# Patient Record
Sex: MALE | Race: WHITE | Employment: FULL TIME | URBAN - NONMETROPOLITAN AREA
[De-identification: names, ages, dates, MRNs, and addresses within clinical notes are randomized per-mention and may not be internally consistent; named-entity substitution may affect disease eponyms.]

---
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
## 2022-01-14 ENCOUNTER — HOSPITAL ENCOUNTER (EMERGENCY)
Age: 22
Discharge: HOME | End: 2022-01-14
Attending: EMERGENCY MEDICINE
Payer: MEDICAID

## 2022-01-14 ENCOUNTER — HOSPITAL ENCOUNTER (EMERGENCY)
Age: 22
Discharge: HOME | End: 2022-01-14
Attending: FAMILY MEDICINE
Payer: MEDICAID

## 2022-01-14 ENCOUNTER — APPOINTMENT (OUTPATIENT)
Dept: CT IMAGING | Age: 22
End: 2022-01-14
Payer: MEDICAID

## 2022-01-14 VITALS
RESPIRATION RATE: 16 BRPM | OXYGEN SATURATION: 98 % | HEART RATE: 83 BPM | DIASTOLIC BLOOD PRESSURE: 85 MMHG | SYSTOLIC BLOOD PRESSURE: 146 MMHG | WEIGHT: 185 LBS | HEIGHT: 74 IN | TEMPERATURE: 97 F | BODY MASS INDEX: 23.74 KG/M2

## 2022-01-14 VITALS
DIASTOLIC BLOOD PRESSURE: 82 MMHG | TEMPERATURE: 98.3 F | HEIGHT: 74 IN | RESPIRATION RATE: 18 BRPM | HEART RATE: 100 BPM | BODY MASS INDEX: 23.74 KG/M2 | SYSTOLIC BLOOD PRESSURE: 133 MMHG | WEIGHT: 185 LBS | OXYGEN SATURATION: 98 %

## 2022-01-14 DIAGNOSIS — K80.50 BILIARY COLIC: ICD-10-CM

## 2022-01-14 DIAGNOSIS — R10.11 RIGHT UPPER QUADRANT ABDOMINAL PAIN: Primary | ICD-10-CM

## 2022-01-14 DIAGNOSIS — K59.00 CONSTIPATION, UNSPECIFIED CONSTIPATION TYPE: ICD-10-CM

## 2022-01-14 DIAGNOSIS — R11.0 NAUSEA: ICD-10-CM

## 2022-01-14 DIAGNOSIS — R10.11 ABDOMINAL PAIN, RIGHT UPPER QUADRANT: Primary | ICD-10-CM

## 2022-01-14 DIAGNOSIS — R16.1 SPLENOMEGALY: ICD-10-CM

## 2022-01-14 DIAGNOSIS — R79.89 ABNORMAL LFTS (LIVER FUNCTION TESTS): ICD-10-CM

## 2022-01-14 LAB
ABSOLUTE EOS #: 0 K/UL (ref 0–0.4)
ABSOLUTE EOS #: 0.1 K/UL (ref 0–0.4)
ABSOLUTE IMMATURE GRANULOCYTE: NORMAL K/UL (ref 0–0.3)
ABSOLUTE IMMATURE GRANULOCYTE: NORMAL K/UL (ref 0–0.3)
ABSOLUTE LYMPH #: 2 K/UL (ref 1.2–5.2)
ABSOLUTE LYMPH #: 3.9 K/UL (ref 1.2–5.2)
ABSOLUTE MONO #: 0.6 K/UL (ref 0–1)
ABSOLUTE MONO #: 0.7 K/UL (ref 0–1)
ALBUMIN SERPL-MCNC: 3.9 G/DL (ref 3.5–5.2)
ALBUMIN SERPL-MCNC: 3.9 G/DL (ref 3.5–5.2)
ALBUMIN/GLOBULIN RATIO: ABNORMAL (ref 1–2.5)
ALBUMIN/GLOBULIN RATIO: ABNORMAL (ref 1–2.5)
ALP BLD-CCNC: 106 U/L (ref 40–129)
ALP BLD-CCNC: 71 U/L (ref 40–129)
ALT SERPL-CCNC: 411 U/L (ref 5–41)
ALT SERPL-CCNC: 99 U/L (ref 5–41)
AMPHETAMINE SCREEN URINE: NEGATIVE
ANION GAP SERPL CALCULATED.3IONS-SCNC: 11 MMOL/L (ref 9–17)
ANION GAP SERPL CALCULATED.3IONS-SCNC: 12 MMOL/L (ref 9–17)
AST SERPL-CCNC: 366 U/L
AST SERPL-CCNC: 73 U/L
BARBITURATE SCREEN URINE: NEGATIVE
BASOPHILS # BLD: 0 % (ref 0–2)
BASOPHILS # BLD: 1 % (ref 0–2)
BASOPHILS ABSOLUTE: 0 K/UL (ref 0–0.2)
BASOPHILS ABSOLUTE: 0 K/UL (ref 0–0.2)
BENZODIAZEPINE SCREEN, URINE: NEGATIVE
BILIRUB SERPL-MCNC: 0.5 MG/DL (ref 0.3–1.2)
BILIRUB SERPL-MCNC: 2.11 MG/DL (ref 0.3–1.2)
BILIRUBIN DIRECT: 1.25 MG/DL
BILIRUBIN URINE: NEGATIVE
BILIRUBIN, INDIRECT: 0.86 MG/DL (ref 0–1)
BUN BLDV-MCNC: 8 MG/DL (ref 6–20)
BUN BLDV-MCNC: 9 MG/DL (ref 6–20)
BUN/CREAT BLD: 12 (ref 9–20)
BUN/CREAT BLD: 14 (ref 9–20)
BUPRENORPHINE URINE: NORMAL
CALCIUM SERPL-MCNC: 8.8 MG/DL (ref 8.6–10.4)
CALCIUM SERPL-MCNC: 8.9 MG/DL (ref 8.6–10.4)
CANNABINOID SCREEN URINE: NEGATIVE
CHLORIDE BLD-SCNC: 101 MMOL/L (ref 98–107)
CHLORIDE BLD-SCNC: 99 MMOL/L (ref 98–107)
CO2: 25 MMOL/L (ref 20–31)
CO2: 30 MMOL/L (ref 20–31)
COCAINE METABOLITE, URINE: NEGATIVE
COLOR: YELLOW
COMMENT UA: NORMAL
CREAT SERPL-MCNC: 0.63 MG/DL (ref 0.7–1.2)
CREAT SERPL-MCNC: 0.68 MG/DL (ref 0.7–1.2)
DIFFERENTIAL TYPE: YES
DIFFERENTIAL TYPE: YES
EOSINOPHILS RELATIVE PERCENT: 1 % (ref 0–5)
EOSINOPHILS RELATIVE PERCENT: 1 % (ref 0–5)
GFR AFRICAN AMERICAN: ABNORMAL ML/MIN
GFR AFRICAN AMERICAN: ABNORMAL ML/MIN
GFR NON-AFRICAN AMERICAN: ABNORMAL ML/MIN
GFR NON-AFRICAN AMERICAN: ABNORMAL ML/MIN
GFR SERPL CREATININE-BSD FRML MDRD: ABNORMAL ML/MIN/{1.73_M2}
GLOBULIN: ABNORMAL G/DL (ref 1.5–3.8)
GLUCOSE BLD-MCNC: 107 MG/DL (ref 70–99)
GLUCOSE BLD-MCNC: 108 MG/DL (ref 70–99)
GLUCOSE URINE: NEGATIVE
HCT VFR BLD CALC: 42.2 % (ref 41–53)
HCT VFR BLD CALC: 43.6 % (ref 41–53)
HEMOGLOBIN: 14.1 G/DL (ref 13.5–17.5)
HEMOGLOBIN: 14.9 G/DL (ref 13.5–17.5)
IMMATURE GRANULOCYTES: NORMAL %
IMMATURE GRANULOCYTES: NORMAL %
KETONES, URINE: NEGATIVE
LACTIC ACID: 1.2 MMOL/L (ref 0.5–2.2)
LEUKOCYTE ESTERASE, URINE: NEGATIVE
LIPASE: 25 U/L (ref 13–60)
LIPASE: 31 U/L (ref 13–60)
LYMPHOCYTES # BLD: 30 % (ref 13–44)
LYMPHOCYTES # BLD: 44 % (ref 13–44)
MCH RBC QN AUTO: 29 PG (ref 26–34)
MCH RBC QN AUTO: 29.3 PG (ref 26–34)
MCHC RBC AUTO-ENTMCNC: 33.6 G/DL (ref 31–37)
MCHC RBC AUTO-ENTMCNC: 34.2 G/DL (ref 31–37)
MCV RBC AUTO: 85.6 FL (ref 80–100)
MCV RBC AUTO: 86.3 FL (ref 80–100)
MDMA URINE: NORMAL
METHADONE SCREEN, URINE: NEGATIVE
METHAMPHETAMINE, URINE: NEGATIVE
MONOCYTES # BLD: 8 % (ref 5–9)
MONOCYTES # BLD: 9 % (ref 5–9)
NITRITE, URINE: NEGATIVE
NRBC AUTOMATED: NORMAL PER 100 WBC
NRBC AUTOMATED: NORMAL PER 100 WBC
OPIATES, URINE: NEGATIVE
OXYCODONE SCREEN URINE: NEGATIVE
PDW BLD-RTO: 13.5 % (ref 12.1–15.2)
PDW BLD-RTO: 14.1 % (ref 12.1–15.2)
PH UA: 6 (ref 5–8)
PHENCYCLIDINE, URINE: NEGATIVE
PLATELET # BLD: 195 K/UL (ref 140–450)
PLATELET # BLD: 202 K/UL (ref 140–450)
PLATELET ESTIMATE: NORMAL
PLATELET ESTIMATE: NORMAL
PMV BLD AUTO: NORMAL FL (ref 6–12)
PMV BLD AUTO: NORMAL FL (ref 6–12)
POTASSIUM SERPL-SCNC: 4.1 MMOL/L (ref 3.7–5.3)
POTASSIUM SERPL-SCNC: 4.3 MMOL/L (ref 3.7–5.3)
PROPOXYPHENE, URINE: NEGATIVE
PROTEIN UA: NEGATIVE
RBC # BLD: 4.88 M/UL (ref 4.5–5.9)
RBC # BLD: 5.09 M/UL (ref 4.5–5.9)
RBC # BLD: NORMAL 10*6/UL
RBC # BLD: NORMAL 10*6/UL
SEG NEUTROPHILS: 46 % (ref 39–75)
SEG NEUTROPHILS: 60 % (ref 39–75)
SEGMENTED NEUTROPHILS ABSOLUTE COUNT: 4 K/UL (ref 2.1–6.5)
SEGMENTED NEUTROPHILS ABSOLUTE COUNT: 4.1 K/UL (ref 2.1–6.5)
SODIUM BLD-SCNC: 138 MMOL/L (ref 135–144)
SODIUM BLD-SCNC: 140 MMOL/L (ref 135–144)
SPECIFIC GRAVITY UA: 1.02 (ref 1–1.03)
TEST INFORMATION: NORMAL
TOTAL PROTEIN: 6.8 G/DL (ref 6.4–8.3)
TOTAL PROTEIN: 6.9 G/DL (ref 6.4–8.3)
TRICYCLIC ANTIDEPRESSANTS, UR: NEGATIVE
TURBIDITY: CLEAR
URINE HGB: NEGATIVE
UROBILINOGEN, URINE: NORMAL
WBC # BLD: 6.7 K/UL (ref 4.5–13.5)
WBC # BLD: 8.7 K/UL (ref 4.5–13.5)
WBC # BLD: NORMAL 10*3/UL
WBC # BLD: NORMAL 10*3/UL

## 2022-01-14 PROCEDURE — 74177 CT ABD & PELVIS W/CONTRAST: CPT

## 2022-01-14 PROCEDURE — 99284 EMERGENCY DEPT VISIT MOD MDM: CPT

## 2022-01-14 PROCEDURE — 99283 EMERGENCY DEPT VISIT LOW MDM: CPT

## 2022-01-14 PROCEDURE — 6360000002 HC RX W HCPCS: Performed by: EMERGENCY MEDICINE

## 2022-01-14 PROCEDURE — 96374 THER/PROPH/DIAG INJ IV PUSH: CPT

## 2022-01-14 PROCEDURE — 81003 URINALYSIS AUTO W/O SCOPE: CPT

## 2022-01-14 PROCEDURE — 80076 HEPATIC FUNCTION PANEL: CPT

## 2022-01-14 PROCEDURE — 6370000000 HC RX 637 (ALT 250 FOR IP): Performed by: FAMILY MEDICINE

## 2022-01-14 PROCEDURE — 83605 ASSAY OF LACTIC ACID: CPT

## 2022-01-14 PROCEDURE — 85025 COMPLETE CBC W/AUTO DIFF WBC: CPT

## 2022-01-14 PROCEDURE — 80048 BASIC METABOLIC PNL TOTAL CA: CPT

## 2022-01-14 PROCEDURE — 80306 DRUG TEST PRSMV INSTRMNT: CPT

## 2022-01-14 PROCEDURE — 83690 ASSAY OF LIPASE: CPT

## 2022-01-14 PROCEDURE — 80053 COMPREHEN METABOLIC PANEL: CPT

## 2022-01-14 PROCEDURE — 6360000004 HC RX CONTRAST MEDICATION: Performed by: FAMILY MEDICINE

## 2022-01-14 RX ORDER — ONDANSETRON 4 MG/1
4 TABLET, ORALLY DISINTEGRATING ORAL EVERY 4 HOURS PRN
Qty: 15 TABLET | Refills: 0 | Status: SHIPPED | OUTPATIENT
Start: 2022-01-14 | End: 2022-02-01

## 2022-01-14 RX ORDER — ONDANSETRON 4 MG/1
4 TABLET, ORALLY DISINTEGRATING ORAL ONCE
Status: COMPLETED | OUTPATIENT
Start: 2022-01-14 | End: 2022-01-14

## 2022-01-14 RX ORDER — DOCUSATE SODIUM 100 MG/1
100 CAPSULE, LIQUID FILLED ORAL 2 TIMES DAILY PRN
Qty: 30 CAPSULE | Refills: 0 | Status: SHIPPED | OUTPATIENT
Start: 2022-01-14 | End: 2022-02-01

## 2022-01-14 RX ORDER — ONDANSETRON 4 MG/1
4 TABLET, ORALLY DISINTEGRATING ORAL ONCE
Status: DISCONTINUED | OUTPATIENT
Start: 2022-01-14 | End: 2022-01-15 | Stop reason: HOSPADM

## 2022-01-14 RX ORDER — KETOROLAC TROMETHAMINE 30 MG/ML
30 INJECTION, SOLUTION INTRAMUSCULAR; INTRAVENOUS ONCE
Status: COMPLETED | OUTPATIENT
Start: 2022-01-14 | End: 2022-01-14

## 2022-01-14 RX ORDER — POLYETHYLENE GLYCOL 3350 17 G/17G
17 POWDER, FOR SOLUTION ORAL DAILY PRN
Qty: 255 G | Refills: 1 | Status: SHIPPED | OUTPATIENT
Start: 2022-01-14 | End: 2022-02-01

## 2022-01-14 RX ADMIN — IOPAMIDOL 75 ML: 755 INJECTION, SOLUTION INTRAVENOUS at 21:13

## 2022-01-14 RX ADMIN — KETOROLAC TROMETHAMINE 30 MG: 30 INJECTION, SOLUTION INTRAMUSCULAR; INTRAVENOUS at 06:05

## 2022-01-14 RX ADMIN — ONDANSETRON 4 MG: 4 TABLET, ORALLY DISINTEGRATING ORAL at 22:33

## 2022-01-14 ASSESSMENT — PAIN DESCRIPTION - LOCATION: LOCATION: ABDOMEN

## 2022-01-14 ASSESSMENT — PAIN DESCRIPTION - ONSET: ONSET: ON-GOING

## 2022-01-14 ASSESSMENT — PAIN DESCRIPTION - DESCRIPTORS: DESCRIPTORS: SHARP

## 2022-01-14 ASSESSMENT — PAIN SCALES - GENERAL
PAINLEVEL_OUTOF10: 8
PAINLEVEL_OUTOF10: 6
PAINLEVEL_OUTOF10: 10

## 2022-01-14 ASSESSMENT — PAIN DESCRIPTION - ORIENTATION: ORIENTATION: RIGHT;UPPER

## 2022-01-14 ASSESSMENT — PAIN DESCRIPTION - PAIN TYPE: TYPE: ACUTE PAIN

## 2022-01-14 ASSESSMENT — PAIN DESCRIPTION - FREQUENCY: FREQUENCY: INTERMITTENT

## 2022-01-14 NOTE — ED PROVIDER NOTES
eMERGENCY dEPARTMENT eNCOUnter      [address] [facility]    Chief Complaint   Patient presents with    Abdominal Pain     Pt c/o RUQ abd pain x1 day       HPI    Triston Gar is a 25 y.o. male who presents to ED from home. By car. With complaint of right upper quadrant abdominal pain. Onset 1 AM.  Patient states that at 1 AM he started having sharp stabbing pain in the right upper abdomen. I she denies fever denies nausea vomiting or diarrhea. Location of symptoms right upper quadrant. REVIEW OF SYSTEMS    All systems reviewed and positives are in the HPI    PAST MEDICAL HISTORY    Past Medical History:   Diagnosis Date    Drug abuse ([facility] [address]        SURGICAL HISTORY    Past Surgical History:   Procedure Laterality Date    TYMPANOSTOMY TUBE PLACEMENT         CURRENT MEDICATIONS        ALLERGIES    Allergies   Allergen Reactions    Augmentin [Amoxicillin-Pot Clavulanate]        FAMILY HISTORY    History reviewed. No pertinent family history.     SOCIAL HISTORY    Social History     Socioeconomic History    Marital status: Single     Spouse name: None    Number of children: None    Years of education: None    Highest education level: None   Occupational History    None   Tobacco Use    Smoking status: Current Every Day Smoker     Packs/day: 1.00     Years: 4.00     Pack years: 4.00     Types: Cigarettes     Start date: 2017    Smokeless tobacco: Never Used   Vaping Use    Vaping Use: Former   Substance and Sexual Activity    Alcohol use: Not Currently    Drug use: Not Currently     Types: Marijuana (Weed), Methamphetamines (Crystal Meth)    Sexual activity: None   Other Topics Concern    None   Social History Narrative    None     Social Determinants of Health     Financial Resource Strain:     Difficulty of Paying Living Expenses: Not on file   Food Insecurity:     Worried About Running Out of Food in the Last Year: Not on file    [place] of Food in the Last Year: Not on file Transportation Needs:     Lack of Transportation (Medical): Not on file    Lack of Transportation (Non-Medical):  Not on file   Physical Activity:     Days of Exercise per Week: Not on file    Minutes of Exercise per Session: Not on file   Stress:     Feeling of Stress : Not on file   Social Connections:     Frequency of Communication with Friends and Family: Not on file    Frequency of Social Gatherings with Friends and Family: Not on file    Attends Evangelical Services: Not on file    Active Member of [address] [place] [address] or Organizations: Not on file    Attends Club or Organization Meetings: Not on file    Marital Status: Not on file   Intimate Partner Violence:     Fear of Current or Ex-Partner: Not on file    Emotionally Abused: Not on file    Physically Abused: Not on file    Sexually Abused: Not on file   Housing Stability:     Unable to Pay for Housing in the Last Year: Not on file    Number of [place] in the Last Year: Not on file    Unstable Housing in the Last Year: Not on file       PHYSICAL EXAM    VITAL SIGNS: BP (!) 146/85   Pulse 83   Temp 97 °F (36.1 °C) (Temporal)   Resp 16   Ht 6' 2\" (1.88 m)   Wt 185 lb (83.9 kg)   SpO2 98%   BMI 23.75 kg/m²   Constitutional:  Well developed, well nourished, no acute distress, non-toxic appearance   Eyes:  PERRL, conjunctiva normal   HENT:  Atraumatic, external ears normal, nose normal, oropharynx moist. Neck supple   Respiratory:  No respiratory distress, normal breath sounds   Cardiovascular:  Normal rate, normal rhythm, no murmurs, no gallops, no rubs   GI: Right upper quadrant tenderness no guarding or rebound  : No CVA tenderness to percussion  Musculoskeletal:  No edema   Integument:  Well hydrated   Neurologic:  Alert & oriented x 3, no focal deficits     EKG        RADIOLOGY/PROCEDURES    No orders to display     Labs  Labs Reviewed   COMPREHENSIVE METABOLIC PANEL - Abnormal; Notable for the following components:       Result Value Glucose 108 (*)     CREATININE 0.63 (*)     ALT 99 (*)     AST 73 (*)     All other components within normal limits   CBC WITH AUTO DIFFERENTIAL   URINALYSIS   URINE DRUG SCREEN   LIPASE           Summation      Patient Course: Patient is given Toradol 30 mg IV. Labs with no acute findings. Patient feels better. Patient will be sent home to follow-up with primary care provider. Other ultrasound is recommended. ED Medications administered this visit:    Medications   ketorolac (TORADOL) injection 30 mg (30 mg IntraVENous Given 1/14/22 0605)       New Prescriptions from this visit:    New Prescriptions    No medications on file       Follow-up:  No follow-up provider specified. Final Impression:   1.  Right upper quadrant abdominal pain               (Please note that portions of this note were completed with a voice recognition program.  Efforts were made to edit the dictations but occasionally words are mis-transcribed.)      (Please note that portions of this note were completed with a voice recognition program.  Efforts were made to edit the dictations but occasionally words are mis-transcribed.)      Kale Smith MD  01/14/22 4182

## 2022-01-15 ENCOUNTER — HOSPITAL ENCOUNTER (OUTPATIENT)
Age: 22
Discharge: HOME | End: 2022-01-15
Payer: MEDICAID

## 2022-01-15 ENCOUNTER — HOSPITAL ENCOUNTER (OUTPATIENT)
Age: 22
Discharge: HOME | End: 2022-01-17
Payer: MEDICAID

## 2022-01-15 ENCOUNTER — HOSPITAL ENCOUNTER (OUTPATIENT)
Dept: ULTRASOUND IMAGING | Age: 22
Discharge: HOME | End: 2022-01-17
Payer: MEDICAID

## 2022-01-15 DIAGNOSIS — R79.89 ABNORMAL LFTS (LIVER FUNCTION TESTS): ICD-10-CM

## 2022-01-15 DIAGNOSIS — R10.11 ABDOMINAL PAIN, RIGHT UPPER QUADRANT: ICD-10-CM

## 2022-01-15 LAB
ALBUMIN SERPL-MCNC: 4.3 G/DL (ref 3.5–5.2)
ALBUMIN/GLOBULIN RATIO: ABNORMAL (ref 1–2.5)
ALP BLD-CCNC: 137 U/L (ref 40–129)
ALT SERPL-CCNC: 493 U/L (ref 5–41)
AST SERPL-CCNC: 354 U/L
BILIRUB SERPL-MCNC: 3.42 MG/DL (ref 0.3–1.2)
BILIRUBIN DIRECT: 2.1 MG/DL
BILIRUBIN, INDIRECT: 1.32 MG/DL (ref 0–1)
GLOBULIN: ABNORMAL G/DL (ref 1.5–3.8)
TOTAL PROTEIN: 7.3 G/DL (ref 6.4–8.3)

## 2022-01-15 PROCEDURE — 76705 ECHO EXAM OF ABDOMEN: CPT

## 2022-01-15 PROCEDURE — 80076 HEPATIC FUNCTION PANEL: CPT

## 2022-01-15 PROCEDURE — 36415 COLL VENOUS BLD VENIPUNCTURE: CPT

## 2022-01-15 NOTE — ED PROVIDER NOTES
[address] [facility]  eMERGENCY dEPARTMENT eNCOUnter          Nely Chang       Chief Complaint   Patient presents with    Abdominal Pain     Pt here earlier for abd pain. Given IV toradol. Pt states he was instructed to come back if he continued to vomit. Pt states he has been unable to keep anything down. Nurses Notes reviewed and I agree except as noted in the HPI. HISTORY OF PRESENT ILLNESS    Will Villa is a 25 y.o. male who presents to the emergency room via private vehicle, patient having abdominal pain and aching in his right upper quadrant, describing 8 out of 10 aching occasionally sharp pain, patient states not been able to keep anything down, vomiting anytime he even tries to drink anything. Patient denies trauma falls, denies gross fever or chills. REVIEW OF SYSTEMS     Review of Systems   All other systems reviewed and are negative. PAST MEDICAL HISTORY    has a past medical history of Drug abuse ([facility] [address] SURGICAL HISTORY      has a past surgical history that includes Tympanostomy tube placement. CURRENT MEDICATIONS       Discharge Medication List as of 1/14/2022 10:36 PM          ALLERGIES     is allergic to augmentin [amoxicillin-pot clavulanate]. FAMILY HISTORY     has no family status information on file. family history is not on file. SOCIAL HISTORY      reports that he has been smoking cigarettes. He started smoking about 5 years ago. He has a 4.00 pack-year smoking history. He has never used smokeless tobacco. He reports previous alcohol use. He reports previous drug use. Drugs: Marijuana (Weed) and Methamphetamines (Crystal Meth). PHYSICAL EXAM     INITIAL VITALS:  height is 6' 2\" (1.88 m) and weight is 185 lb (83.9 kg). His oral temperature is 98.3 °F (36.8 °C). His blood pressure is 133/82 and his pulse is 100. His respiration is 18 and oxygen saturation is 98%.     Physical Exam   Constitutional: Patient is oriented to person, place, and time. Patient appears well-developed and well-nourished. Patient is active and cooperative. HENT:   Head: Normocephalic and atraumatic. Head is without contusion. Right Ear: Hearing and external ear normal. No drainage. Left Ear: Hearing and external ear normal. No drainage. Nose: Nose normal. No nasal deformity. No epistaxis. Mouth/Throat: Mucous membranes are not dry. Eyes: EOMI. Conjunctivae, sclera, and lids are normal. Right eye exhibits no discharge. Left eye exhibits no discharge. Neck: Full passive range of motion without pain and phonation normal.   Cardiovascular:  Normal rate, regular rhythm and intact distal pulses. Pulses: Right radial pulse  2+   Pulmonary/Chest: Effort normal. No tachypnea and no bradypnea. No wheezes, rhonchi, or rales. Abdominal: Soft. Patient without distension, some tenderness right upper quadrant to palpation not greatly preached in the epigastric or right flank space, no overlying integument aberration, gait of Magana Bashir's sign  Musculoskeletal:   Negative acute trauma or deformity,  apparent full range of motion and normal strength all extremities appropriate to age. Neurological: Patient is alert and oriented to person, place, and time. patient displays no tremor. Patient displays no seizure activity. Skin: Skin is warm and dry. Patient is not diaphoretic. Psychiatric: Patient has a normal mood and affect. Patient speech is normal and behavior is normal. Cognition and memory are normal.    DIFFERENTIAL DIAGNOSIS:   Constipation, biliary colic, gallstones, cholecystitis, viral illness    DIAGNOSTIC RESULTS           RADIOLOGY: non-plain film images(s) such as CT, Ultrasound and MRI are read by the radiologist.  CT ABDOMEN PELVIS W IV CONTRAST Additional Contrast? None   Final Result      No acute appendicitis, bowel obstruction or diverticulitis. Large volume of stool in the colon compatible with constipation.  Infectious or inflammatory enteritis versus mild mesenteric adenitis is suggested as above    with trace fluid in the pelvis which is likely reactive. Splenomegaly is suggested. This is nonspecific however no definite focal lesion    is seen within the limitations of phase of injection. Clinical correlation is    recommended. Nonacute findings as noted. These findings were discussed with the referring physician in the emergency    department at approximately 9:58 PM on 1/14/2022 .                             US GALLBLADDER RUQ    (Results Pending)       LABS:   Labs Reviewed   HEPATIC FUNCTION PANEL - Abnormal; Notable for the following components:       Result Value     (*)      (*)     Total Bilirubin 2.11 (*)     Bilirubin, Direct 1.25 (*)     All other components within normal limits   BASIC METABOLIC PANEL W/ REFLEX TO MG FOR LOW K - Abnormal; Notable for the following components:    Glucose 107 (*)     CREATININE 0.68 (*)     All other components within normal limits   CBC WITH AUTO DIFFERENTIAL   LIPASE   LACTIC ACID   URINALYSIS       EMERGENCY DEPARTMENT COURSE:   Vitals:    Vitals:    01/14/22 2041   BP: 133/82   Pulse: 100   Resp: 18   Temp: 98.3 °F (36.8 °C)   TempSrc: Oral   SpO2: 98%   Weight: 185 lb (83.9 kg)   Height: 6' 2\" (1.88 m)     Patient history and physical exam taken at bedside, discussed patient symptoms and exam findings, discussed getting IV access, blood and urine studies, CT abdomen pelvis with IV contrast, patient laying low semi-Fowlers in bed, acknowledged    EMR reviewed, noting ED visit from earlier in the morning, lab work-up reviewed    Lab work reviewed noting normal CBC and differential, , , , BT 2.11, BD 1.25, lipase 25, normal kidney function, normal electrolytes, normal lactic acid    Noted liver enzymes and bilirubin are markedly increased from this morning's labs    CT radiology report reviewed    Case was discussed with Dr. Luna Levy, radiology, regarding patient's CT findings, specifically area around the liver/gallbladder, he does note the increased gallbladder side though there is no gallbladder wall thickening or pericystic fluid, no marked increase in biliary ducts. He did recommend getting ultrasound and other at bedside or formal ultrasound to better evaluate. Oscar with patient his initial work-up, including CT findings, his marked change in his liver studies as compared to this morning, I would like to get a bedside ultrasound performed myself all this is not an official one, patient acknowledges. Denies any limitations of doing the exam, did a bedside ultrasound, easily able to identify the gallbladder, do not appreciate any thickening of pericystic fluid, do not appreciate any stones or comet tails from the gallbladder, and do not appreciate any grossly dilated ducts. Discussed with patient his overall work-up, discussed my concerns for the marked change in his liver enzymes, I would like to get set up a formal ultrasound for patient to possible in the morning as do not want him to wait for 3 days, patient states he would be agreeable with coming in the morning. After discussion with radiology department, we have arranged for the ultrasound technician to show up tomorrow morning to do a formal ultrasound the patient's right upper quadrant, patient states he will be here on time for this. Advised nothing to eat or drink after midnight, outpatient follow-up, I will refer him to Dr. Mikie Benites from surgery for follow-up, otherwise we will for to [facility] primary care to establish primary care in this area, after test I would advise clear liquid diet for few days until he is able to follow-up with surgery, patient acknowledges            FINAL IMPRESSION      1. Abdominal pain, right upper quadrant    2. Abnormal LFTs (liver function tests)    3. Splenomegaly    4. Constipation, unspecified constipation type    5.  Nausea DISPOSITION/PLAN   D/c    PATIENT REFERRED TO:  Jimmy Martines MD  [address] [phone]    Call   Surgery, for follow up on right upper quadrant abdominal pain    [address]  [address] [facility]  [phone]  Call   Primary Care, to establish care    [facility]  [address] [facility] [address] [phone]    As needed, If symptoms worsen      DISCHARGE MEDICATIONS:  Discharge Medication List as of 1/14/2022 10:36 PM      START taking these medications    Details   ondansetron (ZOFRAN ODT) 4 MG disintegrating tablet Take 1 tablet by mouth every 4 hours as needed for Nausea or Vomiting, Disp-15 tablet, R-0Normal      docusate sodium (COLACE) 100 MG capsule Take 1 capsule by mouth 2 times daily as needed for Constipation, Disp-30 capsule, R-0Normal      polyethylene glycol (GLYCOLAX) 17 GM/SCOOP powder Take 17 g by mouth daily as needed (constipation), Disp-255 g, R-1Normal                 Summation      Patient Course: d/c    ED Medications administered this visit:    Medications   iopamidol (ISOVUE-370) 76 % injection 75 mL (75 mLs IntraVENous Given 1/14/22 2113)   ondansetron (ZOFRAN-ODT) disintegrating tablet 4 mg (4 mg Oral Given 1/14/22 2233)       New Prescriptions from this visit:    Discharge Medication List as of 1/14/2022 10:36 PM      START taking these medications    Details   ondansetron (ZOFRAN ODT) 4 MG disintegrating tablet Take 1 tablet by mouth every 4 hours as needed for Nausea or Vomiting, Disp-15 tablet, R-0Normal      docusate sodium (COLACE) 100 MG capsule Take 1 capsule by mouth 2 times daily as needed for Constipation, Disp-30 capsule, R-0Normal      polyethylene glycol (GLYCOLAX) 17 GM/SCOOP powder Take 17 g by mouth daily as needed (constipation), Disp-255 g, R-1Normal             Follow-up:  Jimmy Martines MD  [address] [phone]    Call   Surgery, for follow up on right upper quadrant abdominal pain    [address]  [address] [facility]  Call   Primary Care, to establish care    [facility]  [address] [address] [phone]    As needed, If symptoms worsen        Final Impression:   1. Abdominal pain, right upper quadrant    2. Abnormal LFTs (liver function tests)    3. Splenomegaly    4. Constipation, unspecified constipation type    5.  Nausea               (Please note that portions of this note were completed with a voice recognition program.  Efforts were made to edit the dictations but occasionally words are mis-transcribed.)    MD Sonia Ambrose MD  01/15/22 6832

## 2022-01-15 NOTE — ED PROVIDER NOTES
Patient was seen in the ER yesterday. A outpatient gallbladder ultrasound was ordered along with repeat labs. Gallbladder ultrasound revealed cholelithiasis with no acute findings. There is no evidence of cholecystitis. No biliary dilatation or ascites. Common bile duct was normal at 4 mm. Visualized portions of the pancreas, liver, aorta and IVC are unremarkable. The right kidney is unremarkable measuring 11.5 cm in length. There is no hydronephrosis. Repeat liver enzymes revealed an ALT of 493. This was previously 411. AST was 354, and this was previously 366. Bilirubin was 3.42, and this was previously 2.11. I spoke with Minus , and he recommended that the patient have an MRCP done. I spoke with our radiology department, and MRCP is not available over the weekend. Patient will need to be transferred to another facility with MRCP and ERCP capabilities. I reviewed this with the patient. The patient wants to go home and rest at this time. He informed me that his pain has improved. He assured me he would return later for transfer. Patient would like for me to talk to his mother concerning his condition. I did inform him that I would be glad to do this with his permission. His mother will call the ER later, and I will speak with her. Patient informed me that he is considering going directly to a facility that has MRCP and ERCP capabilities. I did give him a copy of his ultrasound and lab results.      Luis Baca MD  01/15/22 [id number]

## 2022-02-01 ENCOUNTER — HOSPITAL ENCOUNTER (EMERGENCY)
Age: 22
Discharge: HOME | End: 2022-02-01
Attending: EMERGENCY MEDICINE
Payer: MEDICAID

## 2022-02-01 VITALS
RESPIRATION RATE: 18 BRPM | DIASTOLIC BLOOD PRESSURE: 81 MMHG | HEART RATE: 90 BPM | HEIGHT: 74 IN | SYSTOLIC BLOOD PRESSURE: 130 MMHG | OXYGEN SATURATION: 97 % | TEMPERATURE: 98.2 F | WEIGHT: 176 LBS | BODY MASS INDEX: 22.59 KG/M2

## 2022-02-01 DIAGNOSIS — K80.50 BILIARY COLIC: ICD-10-CM

## 2022-02-01 DIAGNOSIS — K80.20 CALCULUS OF GALLBLADDER WITHOUT CHOLECYSTITIS WITHOUT OBSTRUCTION: ICD-10-CM

## 2022-02-01 DIAGNOSIS — R10.11 RIGHT UPPER QUADRANT ABDOMINAL PAIN: Primary | ICD-10-CM

## 2022-02-01 LAB
ABSOLUTE EOS #: 0.1 K/UL (ref 0–0.4)
ABSOLUTE IMMATURE GRANULOCYTE: NORMAL K/UL (ref 0–0.3)
ABSOLUTE LYMPH #: 2.2 K/UL (ref 1.2–5.2)
ABSOLUTE MONO #: 0.5 K/UL (ref 0–1)
ALBUMIN SERPL-MCNC: 3.9 G/DL (ref 3.5–5.2)
ALBUMIN/GLOBULIN RATIO: ABNORMAL (ref 1–2.5)
ALP BLD-CCNC: 89 U/L (ref 40–129)
ALT SERPL-CCNC: 112 U/L (ref 5–41)
ANION GAP SERPL CALCULATED.3IONS-SCNC: 9 MMOL/L (ref 9–17)
AST SERPL-CCNC: 120 U/L
BASOPHILS # BLD: 0 % (ref 0–2)
BASOPHILS ABSOLUTE: 0 K/UL (ref 0–0.2)
BILIRUB SERPL-MCNC: 0.64 MG/DL (ref 0.3–1.2)
BILIRUBIN DIRECT: 0.26 MG/DL
BILIRUBIN URINE: NEGATIVE
BILIRUBIN, INDIRECT: 0.38 MG/DL (ref 0–1)
BUN BLDV-MCNC: 11 MG/DL (ref 6–20)
BUN/CREAT BLD: 16 (ref 9–20)
CALCIUM SERPL-MCNC: 9.1 MG/DL (ref 8.6–10.4)
CHLORIDE BLD-SCNC: 100 MMOL/L (ref 98–107)
CO2: 30 MMOL/L (ref 20–31)
COLOR: YELLOW
COMMENT UA: ABNORMAL
CREAT SERPL-MCNC: 0.67 MG/DL (ref 0.7–1.2)
DIFFERENTIAL TYPE: YES
EOSINOPHILS RELATIVE PERCENT: 1 % (ref 0–5)
GFR AFRICAN AMERICAN: ABNORMAL ML/MIN
GFR NON-AFRICAN AMERICAN: ABNORMAL ML/MIN
GFR SERPL CREATININE-BSD FRML MDRD: ABNORMAL ML/MIN/{1.73_M2}
GFR SERPL CREATININE-BSD FRML MDRD: ABNORMAL ML/MIN/{1.73_M2}
GLOBULIN: ABNORMAL G/DL (ref 1.5–3.8)
GLUCOSE BLD-MCNC: 99 MG/DL (ref 70–99)
GLUCOSE URINE: NEGATIVE
HCT VFR BLD CALC: 44.2 % (ref 41–53)
HEMOGLOBIN: 15.1 G/DL (ref 13.5–17.5)
IMMATURE GRANULOCYTES: NORMAL %
KETONES, URINE: NEGATIVE
LEUKOCYTE ESTERASE, URINE: NEGATIVE
LYMPHOCYTES # BLD: 29 % (ref 13–44)
MCH RBC QN AUTO: 29 PG (ref 26–34)
MCHC RBC AUTO-ENTMCNC: 34.1 G/DL (ref 31–37)
MCV RBC AUTO: 85 FL (ref 80–100)
MONOCYTES # BLD: 7 % (ref 5–9)
NITRITE, URINE: NEGATIVE
NRBC AUTOMATED: NORMAL PER 100 WBC
PDW BLD-RTO: 13.2 % (ref 12.1–15.2)
PH UA: 7 (ref 5–8)
PLATELET # BLD: 197 K/UL (ref 140–450)
PLATELET ESTIMATE: NORMAL
PMV BLD AUTO: NORMAL FL (ref 6–12)
POTASSIUM SERPL-SCNC: 3.9 MMOL/L (ref 3.7–5.3)
PROTEIN UA: ABNORMAL
RBC # BLD: 5.2 M/UL (ref 4.5–5.9)
RBC # BLD: NORMAL 10*6/UL
SEG NEUTROPHILS: 63 % (ref 39–75)
SEGMENTED NEUTROPHILS ABSOLUTE COUNT: 4.8 K/UL (ref 2.1–6.5)
SODIUM BLD-SCNC: 139 MMOL/L (ref 135–144)
SPECIFIC GRAVITY UA: 1.01 (ref 1–1.03)
TOTAL PROTEIN: 4.9 G/DL (ref 6.4–8.3)
TURBIDITY: CLEAR
URINE HGB: NEGATIVE
UROBILINOGEN, URINE: NORMAL
WBC # BLD: 7.7 K/UL (ref 4.5–13.5)
WBC # BLD: NORMAL 10*3/UL

## 2022-02-01 PROCEDURE — 36415 COLL VENOUS BLD VENIPUNCTURE: CPT

## 2022-02-01 PROCEDURE — 85025 COMPLETE CBC W/AUTO DIFF WBC: CPT

## 2022-02-01 PROCEDURE — 80076 HEPATIC FUNCTION PANEL: CPT

## 2022-02-01 PROCEDURE — 6370000000 HC RX 637 (ALT 250 FOR IP): Performed by: EMERGENCY MEDICINE

## 2022-02-01 PROCEDURE — 99283 EMERGENCY DEPT VISIT LOW MDM: CPT

## 2022-02-01 PROCEDURE — 80048 BASIC METABOLIC PNL TOTAL CA: CPT

## 2022-02-01 PROCEDURE — 81003 URINALYSIS AUTO W/O SCOPE: CPT

## 2022-02-01 RX ORDER — ONDANSETRON 2 MG/ML
4 INJECTION INTRAMUSCULAR; INTRAVENOUS ONCE
Status: DISCONTINUED | OUTPATIENT
Start: 2022-02-01 | End: 2022-02-01

## 2022-02-01 RX ORDER — ONDANSETRON 4 MG/1
4 TABLET, ORALLY DISINTEGRATING ORAL ONCE
Status: COMPLETED | OUTPATIENT
Start: 2022-02-01 | End: 2022-02-01

## 2022-02-01 RX ORDER — ONDANSETRON 4 MG/1
4 TABLET, ORALLY DISINTEGRATING ORAL ONCE
Status: DISCONTINUED | OUTPATIENT
Start: 2022-02-01 | End: 2022-02-01

## 2022-02-01 RX ADMIN — ONDANSETRON 4 MG: 4 TABLET, ORALLY DISINTEGRATING ORAL at 22:35

## 2022-02-01 ASSESSMENT — PAIN SCALES - GENERAL: PAINLEVEL_OUTOF10: 6

## 2022-02-01 ASSESSMENT — PAIN DESCRIPTION - DESCRIPTORS: DESCRIPTORS: STABBING

## 2022-02-01 ASSESSMENT — PAIN DESCRIPTION - PAIN TYPE: TYPE: ACUTE PAIN

## 2022-02-01 ASSESSMENT — PAIN DESCRIPTION - LOCATION: LOCATION: ABDOMEN

## 2022-02-02 NOTE — ED PROVIDER NOTES
Food Insecurity:     Worried About Running Out of Food in the Last Year: Not on file    [place] of Food in the Last Year: Not on file   Transportation Needs:     Lack of Transportation (Medical): Not on file    Lack of Transportation (Non-Medical): Not on file   Physical Activity:     Days of Exercise per Week: Not on file    Minutes of Exercise per Session: Not on file   Stress:     Feeling of Stress : Not on file   Social Connections:     Frequency of Communication with Friends and Family: Not on file    Frequency of Social Gatherings with Friends and Family: Not on file    Attends Gnosticism Services: Not on file    Active Member of [address] [place] [address] [employer] or Organizations: Not on file    Attends Club or Organization Meetings: Not on file    Marital Status: Not on file   Intimate Partner Violence:     Fear of Current or Ex-Partner: Not on file    Emotionally Abused: Not on file    Physically Abused: Not on file    Sexually Abused: Not on file   Housing Stability:     Unable to Pay for Housing in the Last Year: Not on file    Number of [place] in the Last Year: Not on file    Unstable Housing in the Last Year: Not on file       PHYSICAL EXAM    VITAL SIGNS: /81   Pulse 90   Temp 98.2 °F (36.8 °C) (Axillary)   Resp 18   Ht 6' 2\" (1.88 m)   Wt 176 lb (79.8 kg)   SpO2 97%   BMI 22.60 kg/m²   Constitutional:  Well developed, well nourished, no acute distress, non-toxic appearance   Eyes:  PERRL, conjunctiva normal   HENT:  Atraumatic, external ears normal, nose normal, oropharynx moist. Neck supple   Respiratory:  No respiratory distress, normal breath sounds   Cardiovascular:  Normal rate, normal rhythm, no murmurs, no gallops, no rubs   GI: Right upper quadrant tenderness. Positive Chino sign.   Positive bowel sounds  : No CVA tenderness to  Musculoskeletal:  No edema   Integument:  Well hydrated   Neurologic:  Alert & oriented x 3, no focal deficits     EKG        RADIOLOGY/PROCEDURES    No orders to display     Labs  Labs Reviewed   HEPATIC FUNCTION PANEL - Abnormal; Notable for the following components:       Result Value     (*)      (*)     Total Protein 4.9 (*)     All other components within normal limits   BASIC METABOLIC PANEL - Abnormal; Notable for the following components:    CREATININE 0.67 (*)     All other components within normal limits   URINALYSIS - Abnormal; Notable for the following components:    Protein, UA TRACE (*)     All other components within normal limits   CBC WITH AUTO DIFFERENTIAL           Summation      Patient Course: Patient will be sent home to follow-up with Dr. Christian Mcelroy tomorrow. The warning signs were discussed. Low-fat diet as discussed. Return to ED if worse  ED Medications administered this visit:    Medications   ondansetron (ZOFRAN-ODT) disintegrating tablet 4 mg (4 mg Oral Given 2/1/22 2235)       New Prescriptions from this visit:    New Prescriptions    No medications on file       Follow-up:  Rena Benoit MD  [address]  [phone]    Schedule an appointment as soon as possible for a visit in 1 day  Return to ED if worse        Final Impression:   1. Right upper quadrant abdominal pain    2. Biliary colic    3.  Calculus of gallbladder without cholecystitis without obstruction               (Please note that portions of this note were completed with a voice recognition program.  Efforts were made to edit the dictations but occasionally words are mis-transcribed.)      (Please note that portions of this note were completed with a voice recognition program.  Efforts were made to edit the dictations but occasionally words are mis-transcribed.)      Vanita Olguin MD  02/01/22 [id number]

## 2022-02-07 ENCOUNTER — OFFICE VISIT (OUTPATIENT)
Dept: SURGERY | Age: 22
End: 2022-02-07
Payer: COMMERCIAL

## 2022-02-07 VITALS — WEIGHT: 170 LBS | BODY MASS INDEX: 21.83 KG/M2

## 2022-02-07 DIAGNOSIS — Z72.0 TOBACCO ABUSE: ICD-10-CM

## 2022-02-07 DIAGNOSIS — F15.10 METHAMPHETAMINE ABUSE (HCC): ICD-10-CM

## 2022-02-07 DIAGNOSIS — K80.20 SYMPTOMATIC CHOLELITHIASIS: Primary | ICD-10-CM

## 2022-02-07 DIAGNOSIS — R79.89 ELEVATED LFTS: ICD-10-CM

## 2022-02-07 DIAGNOSIS — F12.10 MARIJUANA ABUSE: ICD-10-CM

## 2022-02-07 PROCEDURE — G8420 CALC BMI NORM PARAMETERS: HCPCS | Performed by: SURGERY

## 2022-02-07 PROCEDURE — 99203 OFFICE O/P NEW LOW 30 MIN: CPT | Performed by: SURGERY

## 2022-02-07 PROCEDURE — G8428 CUR MEDS NOT DOCUMENT: HCPCS | Performed by: SURGERY

## 2022-02-07 PROCEDURE — 4004F PT TOBACCO SCREEN RCVD TLK: CPT | Performed by: SURGERY

## 2022-02-07 PROCEDURE — G8484 FLU IMMUNIZE NO ADMIN: HCPCS | Performed by: SURGERY

## 2022-03-08 ENCOUNTER — TELEPHONE (OUTPATIENT)
Dept: SURGERY | Age: 22
End: 2022-03-08

## 2022-03-08 NOTE — TELEPHONE ENCOUNTER
LVM with patient requesting return call regarding PAT for surgery. Informed on VM that if he did not return call surgery would be cancelled. Call back number provided.

## 2022-04-26 NOTE — PROGRESS NOTES
Message received from motherCrystal while with another patient. Attempted call back, had to leave message.

## 2022-05-02 NOTE — PROGRESS NOTES
ANESTHESIA NOTIFIED PT IS CURRENTLY STILL SMOKING MARIJUANA AND METH. LAST SMOKED ON 4/30/22. INSTRUCTED THE PT TO AVOID ALL SUBSTANCE ABUSE AS SURGERY IS IN 48 HOURS. PT VERBALIZES UNDERSTANDING. ALSO INSTRUCTED THE PATIENT THAT HE MUST HAVE A RIDE HOME THE DAY OF SURGERY AND CAN NOT BE DISCHARGED VIA CAB SERVICE.

## 2022-05-02 NOTE — PROGRESS NOTES
PAT Phone call attempted. No answer.  Message left on machine with pre-op instructions, NPO status, and surgery arrival time to the hospital.

## 2022-05-03 ENCOUNTER — ANESTHESIA EVENT (OUTPATIENT)
Dept: OPERATING ROOM | Age: 22
End: 2022-05-03

## 2022-05-04 ENCOUNTER — HOSPITAL ENCOUNTER (OUTPATIENT)
Age: 22
Setting detail: OUTPATIENT SURGERY
Discharge: HOME | End: 2022-05-04
Attending: SURGERY | Admitting: SURGERY
Payer: MEDICAID

## 2022-05-04 ENCOUNTER — ANESTHESIA (OUTPATIENT)
Dept: OPERATING ROOM | Age: 22
End: 2022-05-04

## 2022-05-04 VITALS
HEIGHT: 74 IN | RESPIRATION RATE: 12 BRPM | OXYGEN SATURATION: 99 % | BODY MASS INDEX: 23.77 KG/M2 | HEART RATE: 86 BPM | WEIGHT: 185.2 LBS | SYSTOLIC BLOOD PRESSURE: 137 MMHG | TEMPERATURE: 97.4 F | DIASTOLIC BLOOD PRESSURE: 77 MMHG

## 2022-05-04 LAB
AMPHETAMINE SCREEN URINE: POSITIVE
BARBITURATE SCREEN URINE: NEGATIVE
BENZODIAZEPINE SCREEN, URINE: NEGATIVE
BUPRENORPHINE URINE: NEGATIVE
CANNABINOID SCREEN URINE: POSITIVE
COCAINE METABOLITE, URINE: NEGATIVE
METHADONE SCREEN, URINE: NEGATIVE
METHAMPHETAMINE, URINE: POSITIVE
OPIATES, URINE: NEGATIVE
OXYCODONE SCREEN URINE: NEGATIVE
PHENCYCLIDINE, URINE: NEGATIVE
PROPOXYPHENE, URINE: NEGATIVE
TRICYCLIC ANTIDEPRESSANTS, UR: NEGATIVE

## 2022-05-04 PROCEDURE — 6370000000 HC RX 637 (ALT 250 FOR IP): Performed by: NURSE ANESTHETIST, CERTIFIED REGISTERED

## 2022-05-04 PROCEDURE — 2580000003 HC RX 258: Performed by: NURSE ANESTHETIST, CERTIFIED REGISTERED

## 2022-05-04 PROCEDURE — 80306 DRUG TEST PRSMV INSTRMNT: CPT

## 2022-05-04 RX ORDER — ACETAMINOPHEN 325 MG/1
650 TABLET ORAL ONCE
Status: COMPLETED | OUTPATIENT
Start: 2022-05-04 | End: 2022-05-04

## 2022-05-04 RX ORDER — SODIUM CHLORIDE 0.9 % (FLUSH) 0.9 %
5-40 SYRINGE (ML) INJECTION PRN
Status: DISCONTINUED | OUTPATIENT
Start: 2022-05-04 | End: 2022-05-04 | Stop reason: HOSPADM

## 2022-05-04 RX ORDER — SODIUM CHLORIDE 9 MG/ML
INJECTION, SOLUTION INTRAVENOUS PRN
Status: DISCONTINUED | OUTPATIENT
Start: 2022-05-04 | End: 2022-05-04 | Stop reason: HOSPADM

## 2022-05-04 RX ORDER — DIMENHYDRINATE 50 MG/1
50 TABLET ORAL ONCE
Status: COMPLETED | OUTPATIENT
Start: 2022-05-04 | End: 2022-05-04

## 2022-05-04 RX ORDER — GABAPENTIN 300 MG/1
300 CAPSULE ORAL ONCE
Status: COMPLETED | OUTPATIENT
Start: 2022-05-04 | End: 2022-05-04

## 2022-05-04 RX ORDER — SODIUM CHLORIDE 0.9 % (FLUSH) 0.9 %
5-40 SYRINGE (ML) INJECTION EVERY 12 HOURS SCHEDULED
Status: DISCONTINUED | OUTPATIENT
Start: 2022-05-04 | End: 2022-05-04 | Stop reason: HOSPADM

## 2022-05-04 RX ORDER — INDOCYANINE GREEN AND WATER 25 MG
7.5 KIT INJECTION ONCE
Status: DISCONTINUED | OUTPATIENT
Start: 2022-05-04 | End: 2022-05-04 | Stop reason: HOSPADM

## 2022-05-04 RX ORDER — SODIUM CHLORIDE, SODIUM LACTATE, POTASSIUM CHLORIDE, CALCIUM CHLORIDE 600; 310; 30; 20 MG/100ML; MG/100ML; MG/100ML; MG/100ML
INJECTION, SOLUTION INTRAVENOUS CONTINUOUS
Status: DISCONTINUED | OUTPATIENT
Start: 2022-05-04 | End: 2022-05-04 | Stop reason: HOSPADM

## 2022-05-04 RX ADMIN — GABAPENTIN 300 MG: 300 CAPSULE ORAL at 06:50

## 2022-05-04 RX ADMIN — SODIUM CHLORIDE, POTASSIUM CHLORIDE, SODIUM LACTATE AND CALCIUM CHLORIDE: 600; 310; 30; 20 INJECTION, SOLUTION INTRAVENOUS at 07:01

## 2022-05-04 RX ADMIN — ACETAMINOPHEN 650 MG: 325 TABLET ORAL at 06:50

## 2022-05-04 RX ADMIN — DIMENHYDRINATE 50 MG: 50 TABLET ORAL at 06:50

## 2022-05-04 ASSESSMENT — ENCOUNTER SYMPTOMS
BLOOD IN STOOL: 0
ABDOMINAL DISTENTION: 1
VOMITING: 1
BACK PAIN: 0
ABDOMINAL PAIN: 1
SORE THROAT: 0
CHOKING: 0
TROUBLE SWALLOWING: 0
SHORTNESS OF BREATH: 0
COUGH: 0
NAUSEA: 1

## 2022-05-04 ASSESSMENT — PAIN - FUNCTIONAL ASSESSMENT: PAIN_FUNCTIONAL_ASSESSMENT: NONE - DENIES PAIN

## 2022-05-04 ASSESSMENT — PAIN SCALES - GENERAL: PAINLEVEL_OUTOF10: 0

## 2022-05-04 NOTE — PROGRESS NOTES
Coy Arthur MD  General Surgery, Endoscopy  Chief Medical Officer    [facility] Shahnaz Stevenson [address] [address]  Dept: [phone]  Fax: 32 Helen Abreu  Chief Complaint   Patient presents with    New Patient     [place] ED 2/1- gallbladder       HPI    Mr Ben Thakkar is a 19-year-old white male evaluated in [facility] ER on several occasions over the last few months for abdominal pain. Mostly postprandial, with nausea. Occasionally awakens him at night. CT scan abdomen pelvis January 14, 2022 showing gallbladder distention with no definitive stones nor cholecystitis. Splenomegaly, large volume of stool consistent with constipation, normal appendix all noted incidentally. Ultrasound gallbladder January 15, 2022 showing gallstones without cholecystitis nor biliary obstruction. Transient LFT elevations subsequently decreased, consistent with first common bile duct stone. His abdominal pain has significantly improved. No current nausea nor vomiting. No recent weight changes, 170 pounds, BMI 23. No cough, fever nor other respiratory symptoms. No family history of malignancy. Patient also has a history of drug use, marijuana and methamphetamines, as well as 1 pack/day cigarettes. Review of Systems   Constitutional: Negative for activity change, appetite change, chills, fever and unexpected weight change. HENT: Negative for nosebleeds, sneezing, sore throat and trouble swallowing. Eyes: Negative for visual disturbance. Respiratory: Negative for cough, choking and shortness of breath. Cardiovascular: Negative for chest pain, palpitations and leg swelling. Gastrointestinal: Positive for abdominal distention, abdominal pain, nausea and vomiting. Negative for blood in stool. Genitourinary: Negative for dysuria, flank pain and hematuria. Musculoskeletal: Negative for back pain, gait problem and myalgias.    Allergic/Immunologic: Negative for immunocompromised state. Neurological: Negative for dizziness, seizures, syncope, weakness and headaches. Hematological: Does not bruise/bleed easily. Psychiatric/Behavioral: Negative for confusion and sleep disturbance. Past Medical History:   Diagnosis Date    Drug abuse [facility])        Past Surgical History:   Procedure Laterality Date    TYMPANOSTOMY TUBE PLACEMENT         No family history on file. Allergies:  See list    No current outpatient medications on file. No current facility-administered medications for this visit. Social History     Socioeconomic History    Marital status: Single     Spouse name: None    Number of children: None    Years of education: None    Highest education level: None   Occupational History    None   Tobacco Use    Smoking status: Current Every Day Smoker     Packs/day: 1.00     Years: 4.00     Pack years: 4.00     Types: Cigarettes     Start date: 2017    Smokeless tobacco: Never Used   Vaping Use    Vaping Use: Former   Substance and Sexual Activity    Alcohol use: Not Currently    Drug use: Not Currently     Types: Marijuana (Weed), Methamphetamines (Crystal Meth)    Sexual activity: None   Other Topics Concern    None   Social History Narrative    None     Social Determinants of Health     Financial Resource Strain:     Difficulty of Paying Living Expenses: Not on file   Food Insecurity:     Worried About Running Out of Food in the Last Year: Not on file    [place] of Food in the Last Year: Not on file   Transportation Needs:     Lack of Transportation (Medical): Not on file    Lack of Transportation (Non-Medical):  Not on file   Physical Activity:     Days of Exercise per Week: Not on file    Minutes of Exercise per Session: Not on file   Stress:     Feeling of Stress : Not on file   Social Connections:     Frequency of Communication with Friends and Family: Not on file    Frequency of Social Gatherings with Friends and Family: Not on file    Attends Anglican Services: Not on file    Active Member of Clubs or Organizations: Not on file    Attends Club or Organization Meetings: Not on file    Marital Status: Not on file   Intimate Partner Violence:     Fear of Current or Ex-Partner: Not on file    Emotionally Abused: Not on file    Physically Abused: Not on file    Sexually Abused: Not on file   Housing Stability:     Unable to Pay for Housing in the Last Year: Not on file    Number of [place] in the Last Year: Not on file    Unstable Housing in the Last Year: Not on file       Wt 170 lb (77.1 kg)   BMI 21.83 kg/m²      Physical Exam  Vitals and nursing note reviewed. Constitutional:       Appearance: He is well-developed. HENT:      Head: Normocephalic and atraumatic. Eyes:      General: No scleral icterus. Conjunctiva/sclera: Conjunctivae normal.      Pupils: Pupils are equal, round, and reactive to light. Neck:      Vascular: No JVD. Trachea: No tracheal deviation. Cardiovascular:      Rate and Rhythm: Normal rate and regular rhythm. Pulmonary:      Effort: Pulmonary effort is normal. No respiratory distress. Chest:      Chest wall: No tenderness. Abdominal:      General: There is no distension. Palpations: Abdomen is soft. There is no mass. Tenderness: There is no abdominal tenderness. There is no guarding or rebound. Musculoskeletal:         General: Normal range of motion. Cervical back: Normal range of motion and neck supple. Lymphadenopathy:      Cervical: No cervical adenopathy. Skin:     General: Skin is warm and dry. Findings: No erythema or rash. Neurological:      Mental Status: He is alert and oriented to person, place, and time. Cranial Nerves: No cranial nerve deficit. Psychiatric:         Behavior: Behavior normal.         Thought Content:  Thought content normal.         Judgment: Judgment normal.         IMAGING/LABS    US GALLBLADDER RUQ    Status: Final result       Order Providers    Authorizing Encounter Billing   MD Crys Preston MD            Signed by    Signed Date/Time Phone Pager   Sherman Randall 1/15/2022  9:33 [phone]      Reading Providers    Read Date Phone Pager   Sherman Randall Sat Pino 15, 2022  9:33 [phone]      US GALLBLADDER RUQ: Patient Communication    Not Released Not seen     Routing History    Priority Sent On From To Message Type    1/15/2022  9:35 AM Greg, Mhpn Incoming Radiant Results From [facility] Radiology F/U Results     Radiation Dose Estimates    No radiation information found for this patient  Narrative   EXAM: US GALLBLADDER RUQ        HISTORY: R10.11       COMPARISON: CT of 1/14/2022            TECHNIQUE: Ultrasound abdomen right upper quadrant           FINDINGS: Small stones in the gallbladder. No evidence of cholecystitis. No    biliary dilatation or ascites. Common bile duct is normal 4 mm.       Visualized portions of the pancreas, liver, aorta and IVC are unremarkable. The    right kidney is unremarkable measuring 11.5 cm in length. There is no    hydronephrosis.             Impression       Cholelithiasis. No acute findings       ASSESSMENT     Diagnosis Orders   1. Symptomatic cholelithiasis     2. Elevated LFTs     3. BMI 23.0-23.9, adult     4. Methamphetamine abuse ([address]     5. Marijuana abuse     6.  Tobacco abuse         PLAN    Discussed at length with Mr. Estephanie Nguyen is a long intermittent history of abdominal pain, nausea, vomiting, constipation, CT and ultrasound evidence of cholelithiasis, transient elevation of LFTs, etc.  We will proceed with elective laparoscopic cholecystectomy, robotic assist.  Risks, benefits, alternatives thoroughly reviewed and accepted by Mr. Estephanie Nguyen including bleeding, infection, bowel/bile duct injury, COVID-19 exposure/infection, etc.  Also reviewed the importance of avoidance of illicit drug use and strongly encouraged complete tobacco cessation. He conveys clear understanding and is in agreement.      Tapan Pérez MD

## 2022-05-04 NOTE — PATIENT INSTRUCTIONS
Patient Education        Learning About Gallbladder Removal Surgery  What is it? This surgery removes the gallbladder and gallstones, if you have any. The gallbladder stores bile made by your liver. The bile helps you digest fats. Gallstones are made of cholesterol and other things found in bile. The surgery is also known as cholecystectomy (py-wrw-aig-MIHIR-tuh-ham). Your body will work fine without a gallbladder. Bile will go straight from the liver to the intestine. There may be small changes in how you digest food. Butyou probably won't notice them. How is the surgery done? This is usually a laparoscopic surgery. To do this type of surgery, a doctor puts a lighted tube, or scope, and other surgical tools through small cuts (incisions) in your belly. The belly is filled with air. The air is injected with a needle. The air pushes the belly wall away from the organs so that thesurgeon can see them clearly. The cuts leave scars that usually fade with time. Open surgery may be done if problems are found during laparoscopic surgery. With open surgery, the gallbladder is removed through one larger cut in yourbelly. And the hospital stay is longer. What can you expect after surgery? You will probably feel weak and tired for several days after you return home. Your belly may be swollen. If you had a laparoscopy, the air used during surgery can irritate your diaphragm for a few days. This can cause some achesor pain in your shoulder for a couple of days after surgery. You may have gas or need to burp a lot at first.  A few people get diarrhea. It usually goes away in 2 to 4 weeks. But it may last longer. How quickly you get better depends on which kind of surgery youhad. For laparoscopic surgery, most people can go back to work or their normalroutine in 1 to 2 weeks. It depends on the type of work you do and how you feel.   If you have open surgery, it will probably take 4 to 6 weeks before you getback to your normal routine. Follow-up care is a key part of your treatment and safety. Be sure to make and go to all appointments, and call your doctor if you are having problems. It's also a good idea to know your test results and keep alist of the medicines you take. Where can you learn more? Go to [email or web address] and sign in to your [employer] account. Enter T996 in the [employer] box to learn more about \"Learning About Gallbladder Removal Surgery. \"     If you do not have an account, please click on the \"Sign Up Now\" link. Current as of: September 8, 2021               Content Version: 13.2  © 2006-2022 [employer]wise, Inc[employer]. Care instructions adapted under license by [facility] If you have questions about a medical condition or this instruction, always ask your healthcare professional. [address] any warranty or liability for your use of this information.

## 2022-05-04 NOTE — H&P
HPI     Mr Yrn Brown is a 80-year-old white male evaluated in [facility] ER on several occasions over the last few months for abdominal pain. Mostly postprandial, with nausea. Occasionally awakens him at night. CT scan abdomen pelvis January 14, 2022 showing gallbladder distention with no definitive stones nor cholecystitis. Splenomegaly, large volume of stool consistent with constipation, normal appendix all noted incidentally. Ultrasound gallbladder January 15, 2022 showing gallstones without cholecystitis nor biliary obstruction. Transient LFT elevations subsequently decreased, consistent with first common bile duct stone. His abdominal pain has significantly improved. No current nausea nor vomiting. No recent weight changes, 170 pounds, BMI 23. No cough, fever nor other respiratory symptoms. No family history of malignancy. Patient also has a history of drug use, marijuana and methamphetamines, as well as 1 pack/day cigarettes. Review of Systems   Constitutional: Negative for activity change, appetite change, chills, fever and unexpected weight change. HENT: Negative for nosebleeds, sneezing, sore throat and trouble swallowing. Eyes: Negative for visual disturbance. Respiratory: Negative for cough, choking and shortness of breath. Cardiovascular: Negative for chest pain, palpitations and leg swelling. Gastrointestinal: Positive for abdominal distention, abdominal pain, nausea and vomiting. Negative for blood in stool. Genitourinary: Negative for dysuria, flank pain and hematuria. Musculoskeletal: Negative for back pain, gait problem and myalgias. Allergic/Immunologic: Negative for immunocompromised state. Neurological: Negative for dizziness, seizures, syncope, weakness and headaches. Hematological: Does not bruise/bleed easily. Psychiatric/Behavioral: Negative for confusion and sleep disturbance.          Past Medical History        Past Medical History:   Diagnosis Date    Drug abuse [facility])              Past Surgical History         Past Surgical History:   Procedure Laterality Date    TYMPANOSTOMY TUBE PLACEMENT                Family History   No family history on file. Allergies:  See list     Current Facility-Administered Medications   No current outpatient medications on file. No current facility-administered medications for this visit. Social History   Social History            Socioeconomic History    Marital status: Single       Spouse name: None    Number of children: None    Years of education: None    Highest education level: None   Occupational History    None   Tobacco Use    Smoking status: Current Every Day Smoker       Packs/day: 1.00       Years: 4.00       Pack years: 4.00       Types: Cigarettes       Start date: 2017    Smokeless tobacco: Never Used   Vaping Use    Vaping Use: Former   Substance and Sexual Activity    Alcohol use: Not Currently    Drug use: Not Currently       Types: Marijuana (Weed), Methamphetamines (Crystal Meth)    Sexual activity: None   Other Topics Concern    None   Social History Narrative    None      Social Determinants of Health          Financial Resource Strain:     Difficulty of Paying Living Expenses: Not on file   Food Insecurity:     Worried About Running Out of Food in the Last Year: Not on file    Migd[place] of Food in the Last Year: Not on file   Transportation Needs:     Lack of Transportation (Medical): Not on file    Lack of Transportation (Non-Medical):  Not on file   Physical Activity:     Days of Exercise per Week: Not on file    Minutes of Exercise per Session: Not on file   Stress:     Feeling of Stress : Not on file   Social Connections:     Frequency of Communication with Friends and Family: Not on file    Frequency of Social Gatherings with Friends and Family: Not on file    Attends Christian Services: Not on file    Active Member of Clubs or Organizations: Not on file   Clary Monsivais Attends Club or Organization Meetings: Not on file    Marital Status: Not on file   Intimate Partner Violence:     Fear of Current or Ex-Partner: Not on file    Emotionally Abused: Not on file    Physically Abused: Not on file    Sexually Abused: Not on file   Housing Stability:     Unable to Pay for Housing in the Last Year: Not on file    Number of [place] in the Last Year: Not on file    Unstable Housing in the Last Year: Not on file            Wt 170 lb (77.1 kg)   BMI 21.83 kg/m²       Physical Exam  Vitals and nursing note reviewed. Constitutional:       Appearance: He is well-developed. HENT:      Head: Normocephalic and atraumatic. Eyes:      General: No scleral icterus. Conjunctiva/sclera: Conjunctivae normal.      Pupils: Pupils are equal, round, and reactive to light. Neck:      Vascular: No JVD. Trachea: No tracheal deviation. Cardiovascular:      Rate and Rhythm: Normal rate and regular rhythm. Pulmonary:      Effort: Pulmonary effort is normal. No respiratory distress. Chest:      Chest wall: No tenderness. Abdominal:      General: There is no distension. Palpations: Abdomen is soft. There is no mass. Tenderness: There is no abdominal tenderness. There is no guarding or rebound. Musculoskeletal:         General: Normal range of motion. Cervical back: Normal range of motion and neck supple. Lymphadenopathy:      Cervical: No cervical adenopathy. Skin:     General: Skin is warm and dry. Findings: No erythema or rash. Neurological:      Mental Status: He is alert and oriented to person, place, and time. Cranial Nerves: No cranial nerve deficit. Psychiatric:         Behavior: Behavior normal.         Thought Content:  Thought content normal.         Judgment: Judgment normal.            IMAGING/LABS     US GALLBLADDER RUQ     Status: Final result         Order Providers     Authorizing Encounter Billing   Claudette Gibes, MD [facility] DIVISION ULTRASOUND ROOM Leena Granados MD              Signed by     Signed Date/Time Phone Pager   Pari Vergara 1/15/2022  9:33 [phone]        Reading Providers     Read Date Phone Pager   Pari Perez Pino 15, 2022  9:33 [phone]        US GALLBLADDER RUQ: Patient Communication     Not Released Not seen      Routing History     Priority Sent On From To Message Type     1/15/2022  9:35 AM Greg, Mhpn Incoming Radiant Results From [facility] Radiology F/U Results      Radiation Dose Estimates     No radiation information found for this patient  Narrative   EXAM: US GALLBLADDER RUQ        HISTORY: R10.11       COMPARISON: CT of 1/14/2022            TECHNIQUE: Ultrasound abdomen right upper quadrant           FINDINGS: Small stones in the gallbladder. No evidence of cholecystitis. No    biliary dilatation or ascites. Common bile duct is normal 4 mm. Visualized portions of the pancreas, liver, aorta and IVC are unremarkable. The    right kidney is unremarkable measuring 11.5 cm in length. There is no    hydronephrosis. Impression       Cholelithiasis. No acute findings         ASSESSMENT       Diagnosis Orders   1. Symptomatic cholelithiasis      2. Elevated LFTs      3. BMI 23.0-23.9, adult      4. Methamphetamine abuse ([address]      5. Marijuana abuse      6. Tobacco abuse            PLAN     Previously discussed at length with Mr. Rober Reyes is a long intermittent history of abdominal pain, nausea, vomiting, constipation, CT and ultrasound evidence of cholelithiasis, transient elevation of LFTs, etc.  We will proceed with elective laparoscopic cholecystectomy, robotic assist.  Risks, benefits, alternatives thoroughly reviewed and accepted by Mr. Rober Reyes including bleeding, infection, bowel/bile duct injury, COVID-19 exposure/infection, etc.  Also reviewed the importance of avoidance of illicit drug use and strongly encouraged complete tobacco cessation.   He conveys clear understanding and is in agreement. Addendum:    Patient states that he has recently used drugs. In light of this, as well as the fact that there are no immediate surgical requirements, we will reschedule elective lap juan for a future date when patient's tox screen is negative. In the meantime, continue healthy bland diet, avoiding fatty greasy foods. If he has significantly increased abdominal pain, persistent nausea and vomiting, or any other serious signs/symptoms, he agrees to return earlier for reevaluation. Mr. Sheehan Bowels conveys clear understanding and is in agreement.      Parul Malave MD

## 2022-06-22 ENCOUNTER — HOSPITAL ENCOUNTER (EMERGENCY)
Age: 22
Discharge: HOME | End: 2022-06-22
Attending: EMERGENCY MEDICINE
Payer: MEDICAID

## 2022-06-22 VITALS
SYSTOLIC BLOOD PRESSURE: 136 MMHG | HEART RATE: 73 BPM | RESPIRATION RATE: 18 BRPM | BODY MASS INDEX: 24.92 KG/M2 | HEIGHT: 74 IN | TEMPERATURE: 98.3 F | DIASTOLIC BLOOD PRESSURE: 83 MMHG | WEIGHT: 194.2 LBS | OXYGEN SATURATION: 98 %

## 2022-06-22 DIAGNOSIS — K80.50 BILIARY COLIC: Primary | ICD-10-CM

## 2022-06-22 LAB
ABSOLUTE EOS #: NORMAL K/UL (ref 0–0.4)
ABSOLUTE LYMPH #: 1.94 K/UL (ref 1.2–5.2)
ABSOLUTE MONO #: 0.4 K/UL (ref 0–1)
ALBUMIN SERPL-MCNC: 4.7 G/DL (ref 3.5–5.2)
ALP BLD-CCNC: 112 U/L (ref 40–129)
ALT SERPL-CCNC: 614 U/L (ref 5–41)
ANION GAP SERPL CALCULATED.3IONS-SCNC: 10 MMOL/L (ref 8–16)
AST SERPL-CCNC: 352 U/L
BASOPHILS # BLD: NORMAL % (ref 0–2)
BASOPHILS ABSOLUTE: NORMAL K/UL (ref 0–0.2)
BILIRUB SERPL-MCNC: 0.97 MG/DL (ref 0.3–1.2)
BILIRUBIN URINE: NEGATIVE
BUN BLDV-MCNC: 14 MG/DL (ref 6–20)
BUN/CREAT BLD: 18 (ref 9–20)
CALCIUM SERPL-MCNC: 9.5 MG/DL (ref 8.6–10.4)
CHLORIDE BLD-SCNC: 103 MMOL/L (ref 98–107)
CO2: 27 MMOL/L (ref 20–31)
COLOR: YELLOW
COMMENT UA: NORMAL
CREAT SERPL-MCNC: 0.77 MG/DL (ref 0.7–1.2)
EOSINOPHILS RELATIVE PERCENT: NORMAL % (ref 0–5)
GFR NON-AFRICAN AMERICAN: ABNORMAL ML/MIN
GFR SERPL CREATININE-BSD FRML MDRD: ABNORMAL ML/MIN/{1.73_M2}
GLUCOSE BLD-MCNC: 118 MG/DL (ref 70–99)
GLUCOSE URINE: NEGATIVE
HCT VFR BLD CALC: 42.9 % (ref 41–53)
HEMOGLOBIN: 14.5 G/DL (ref 13.5–17.5)
KETONES, URINE: NEGATIVE
LEUKOCYTE ESTERASE, URINE: NEGATIVE
LYMPHOCYTES # BLD: 29 % (ref 13–44)
MCH RBC QN AUTO: 29.7 PG (ref 26–34)
MCHC RBC AUTO-ENTMCNC: 33.8 G/DL (ref 31–37)
MCV RBC AUTO: 87.8 FL (ref 80–100)
MONOCYTES # BLD: 6 % (ref 5–9)
MORPHOLOGY: NORMAL
NITRITE, URINE: NEGATIVE
PDW BLD-RTO: 13.5 % (ref 12.1–15.2)
PH UA: 8 (ref 5–8)
PLATELET # BLD: 208 K/UL (ref 140–450)
POTASSIUM SERPL-SCNC: 3.9 MMOL/L (ref 3.7–5.3)
PROTEIN UA: NEGATIVE
RBC # BLD: 4.89 M/UL (ref 4.5–5.9)
SEG NEUTROPHILS: 65 % (ref 39–75)
SEGMENTED NEUTROPHILS ABSOLUTE COUNT: 4.36 K/UL (ref 2.1–6.5)
SODIUM BLD-SCNC: 140 MMOL/L (ref 135–144)
SPECIFIC GRAVITY UA: 1.01 (ref 1–1.03)
TOTAL PROTEIN: 7.2 G/DL (ref 6.4–8.3)
TURBIDITY: CLEAR
URINE HGB: NEGATIVE
UROBILINOGEN, URINE: NORMAL
WBC # BLD: 6.7 K/UL (ref 4.5–13.5)

## 2022-06-22 PROCEDURE — 2580000003 HC RX 258: Performed by: EMERGENCY MEDICINE

## 2022-06-22 PROCEDURE — 85025 COMPLETE CBC W/AUTO DIFF WBC: CPT

## 2022-06-22 PROCEDURE — 96374 THER/PROPH/DIAG INJ IV PUSH: CPT

## 2022-06-22 PROCEDURE — 81003 URINALYSIS AUTO W/O SCOPE: CPT

## 2022-06-22 PROCEDURE — 80053 COMPREHEN METABOLIC PANEL: CPT

## 2022-06-22 PROCEDURE — 6360000002 HC RX W HCPCS: Performed by: EMERGENCY MEDICINE

## 2022-06-22 PROCEDURE — 96375 TX/PRO/DX INJ NEW DRUG ADDON: CPT

## 2022-06-22 PROCEDURE — 99284 EMERGENCY DEPT VISIT MOD MDM: CPT

## 2022-06-22 RX ORDER — ONDANSETRON 2 MG/ML
4 INJECTION INTRAMUSCULAR; INTRAVENOUS ONCE
Status: COMPLETED | OUTPATIENT
Start: 2022-06-22 | End: 2022-06-22

## 2022-06-22 RX ORDER — ONDANSETRON 4 MG/1
4 TABLET, FILM COATED ORAL EVERY 8 HOURS PRN
Qty: 12 TABLET | Refills: 0 | Status: SHIPPED | OUTPATIENT
Start: 2022-06-22 | End: 2022-06-27

## 2022-06-22 RX ORDER — 0.9 % SODIUM CHLORIDE 0.9 %
1000 INTRAVENOUS SOLUTION INTRAVENOUS ONCE
Status: COMPLETED | OUTPATIENT
Start: 2022-06-22 | End: 2022-06-22

## 2022-06-22 RX ORDER — KETOROLAC TROMETHAMINE 15 MG/ML
15 INJECTION, SOLUTION INTRAMUSCULAR; INTRAVENOUS ONCE
Status: COMPLETED | OUTPATIENT
Start: 2022-06-22 | End: 2022-06-22

## 2022-06-22 RX ORDER — KETOROLAC TROMETHAMINE 10 MG/1
10 TABLET, FILM COATED ORAL EVERY 8 HOURS PRN
Qty: 15 TABLET | Refills: 0 | Status: SHIPPED | OUTPATIENT
Start: 2022-06-22 | End: 2022-08-13

## 2022-06-22 RX ADMIN — SODIUM CHLORIDE 1000 ML: 9 INJECTION, SOLUTION INTRAVENOUS at 18:23

## 2022-06-22 RX ADMIN — KETOROLAC TROMETHAMINE 15 MG: 15 INJECTION, SOLUTION INTRAMUSCULAR; INTRAVENOUS at 18:24

## 2022-06-22 RX ADMIN — ONDANSETRON 4 MG: 2 INJECTION INTRAMUSCULAR; INTRAVENOUS at 18:24

## 2022-06-22 ASSESSMENT — PAIN - FUNCTIONAL ASSESSMENT: PAIN_FUNCTIONAL_ASSESSMENT: 0-10

## 2022-06-22 ASSESSMENT — PAIN SCALES - GENERAL: PAINLEVEL_OUTOF10: 7

## 2022-06-22 ASSESSMENT — PAIN DESCRIPTION - ORIENTATION: ORIENTATION: RIGHT;UPPER

## 2022-06-22 ASSESSMENT — PAIN DESCRIPTION - PAIN TYPE: TYPE: ACUTE PAIN

## 2022-06-22 ASSESSMENT — PAIN DESCRIPTION - DESCRIPTORS: DESCRIPTORS: STABBING

## 2022-06-22 NOTE — ED PROVIDER NOTES
HPI:  6/22/22,   Time: 6:09 PM EDT         Jessica Bland is a 25 y.o. male presenting to the ED for gradual onset of right upper quadrant pain that has been chronic but worse, beginning for 3 days ago. The complaint has been constant, moderate in severity, and worsened by food and palpation. No fever chills night sweats no chest pain or shortness of breath or cough no vomiting or diarrhea no urinary complaints. History of gall stones    ROS:   Pertinent positives and negatives are stated within HPI, all other systems reviewed and are negative.  --------------------------------------------- PAST HISTORY ---------------------------------------------  Past Medical History:  has a past medical history of Drug abuse ([facility] [address] Past Surgical History:  has a past surgical history that includes Tympanostomy tube placement. Social History:  reports that he has been smoking cigarettes. He started smoking about 5 years ago. He has a 4.00 pack-year smoking history. He has never used smokeless tobacco. He reports previous alcohol use. He reports previous drug use. Drugs: Marijuana (Weed) and Methamphetamines (Crystal Meth). Family History: family history is not on file. The patients home medications have been reviewed.     Allergies: Augmentin [amoxicillin-pot clavulanate]    -------------------------------------------------- RESULTS -------------------------------------------------  All laboratory and radiology results have been personally reviewed by myself   LABS:  Results for orders placed or performed during the hospital encounter of 06/22/22   CBC with Auto Differential   Result Value Ref Range    WBC 6.7 4.5 - 13.5 k/uL    RBC 4.89 4.5 - 5.9 m/uL    Hemoglobin 14.5 13.5 - 17.5 g/dL    Hematocrit 42.9 41 - 53 %    MCV 87.8 80 - 100 fL    MCH 29.7 26 - 34 pg    MCHC 33.8 31 - 37 g/dL    RDW 13.5 12.1 - 15.2 %    Platelets 426 372 - 545 k/uL    Seg Neutrophils 65 39 - 75 %    Lymphocytes 29 13 - 44 % Monocytes 6 5 - 9 %    Eosinophils %      0 - 5 %    Basophils      0 - 2 %    Segs Absolute 4.36 2.1 - 6.5 k/uL    Absolute Lymph # 1.94 1.2 - 5.2 k/uL    Absolute Mono # 0.40 0.0 - 1.0 k/uL    Absolute Eos #      0.0 - 0.4 k/uL    Basophils Absolute      0.0 - 0.2 k/uL    Morphology Manual Differential Performed    Comprehensive Metabolic Panel w/ Reflex to MG   Result Value Ref Range    Glucose 118 (H) 70 - 99 mg/dL    BUN 14 6 - 20 mg/dL    CREATININE 0.77 0.70 - 1.20 mg/dL    Bun/Cre Ratio 18 9 - 20    Calcium 9.5 8.6 - 10.4 mg/dL    Sodium 140 135 - 144 mmol/L    Potassium 3.9 3.7 - 5.3 mmol/L    Chloride 103 98 - 107 mmol/L    CO2 27 20 - 31 mmol/L    Anion Gap 10 8 - 16 mmol/L    Alkaline Phosphatase 112 40 - 129 U/L     (H) 5 - 41 U/L     (H) <40 U/L    Total Bilirubin 0.97 0.30 - 1.20 mg/dL    Total Protein 7.2 6.4 - 8.3 g/dL    Albumin 4.7 3.5 - 5.2 g/dL    GFR Non-African American  >60 mL/min     Pediatric GFR requires additional information. Refer to [facility] website for calculator. GFR Comment         Urinalysis   Result Value Ref Range    Color, UA Yellow Yellow    Turbidity UA Clear Clear    Glucose, Ur NEGATIVE NEGATIVE    Bilirubin Urine NEGATIVE NEGATIVE    Ketones, Urine NEGATIVE NEGATIVE    Specific Gravity, UA 1.010 1.005 - 1.030    Urine Hgb NEGATIVE NEGATIVE    pH, UA 8.0 5.0 - 8.0    Protein, UA NEGATIVE NEGATIVE    Urobilinogen, Urine Normal Normal    Nitrite, Urine NEGATIVE NEGATIVE    Leukocyte Esterase, Urine NEGATIVE NEGATIVE    Urinalysis Comments             RADIOLOGY:  Interpreted by Radiologist.  [id number]    (Results Pending)       ------------------------- NURSING NOTES AND VITALS REVIEWED ---------------------------   The nursing notes within the ED encounter and vital signs as below have been reviewed.    /83   Pulse 73   Temp 98.3 °F (36.8 °C) (Oral)   Resp 18   Ht 6' 2\" (1.88 m)   Wt 194 lb 3.2 oz (88.1 kg)   SpO2 98%   BMI 24.93 kg/m² Oxygen Saturation Interpretation: Normal      ---------------------------------------------------PHYSICAL EXAM--------------------------------------      Constitutional/General: Alert and oriented x3, well appearing, non toxic in mild distress secondary to pain  Head: NC/AT  Eyes: PERRL, EOMI  Mouth: Oropharynx clear, handling secretions, no trismus  Neck: Supple, full ROM, no meningeal signs  Pulmonary: Lungs clear to auscultation bilaterally, no wheezes, rales, or rhonchi. Not in respiratory distress  Cardiovascular:  Regular rate and rhythm, no murmurs, gallops, or rubs. 2+ distal pulses  Abdomen: Soft, non tender, non distended,   Extremities: Moves all extremities x 4. Warm and well perfused  Skin: warm and dry without rash  Neurologic: GCS 15,  Psych: Normal Affect      ------------------------------ ED COURSE/MEDICAL DECISION MAKING----------------------  Medications   0.9 % sodium chloride bolus (1,000 mLs IntraVENous New Bag 6/22/22 1823)   ketorolac (TORADOL) injection 15 mg (15 mg IntraVENous Given 6/22/22 1824)   ondansetron (ZOFRAN) injection 4 mg (4 mg IntraVENous Given 6/22/22 1824)         Medical Decision Making:    Right upper quadrant pain history of cholelithiasis biliary colic    Counseling: The emergency provider has spoken with the patient and discussed todays results, in addition to providing specific details for the plan of care and counseling regarding the diagnosis and prognosis. Questions are answered at this time and they are agreeable with the plan.      --------------------------------- IMPRESSION AND DISPOSITION ---------------------------------    IMPRESSION  1.  Biliary colic Stable       DISPOSITION  Disposition: Discharge to home  Patient condition is stable                  Shabbir Scott MD  06/22/22 [id number]

## 2022-06-22 NOTE — LETTER
[facility] ED  [address] [place], [address]  Phone: [phone]               June 22, 2022    Patient: Scott Elizondo   YOB: 2000   Date of Visit: 6/22/2022       To Whom It May Concern:    Scott Elizondo was seen and treated in our emergency department on 6/22/2022. He may return to work on 6/23/22.       Sincerely,       Dre Oliver RN         Signature:__________________________________

## 2022-06-22 NOTE — PROGRESS NOTES
Discharge instructions reviewed. Pt aware to come in for US of GB tomorrow at 0930. Aware to not eat or drink after midnight.

## 2022-06-23 ENCOUNTER — HOSPITAL ENCOUNTER (OUTPATIENT)
Dept: ULTRASOUND IMAGING | Age: 22
Discharge: HOME | End: 2022-06-25
Payer: MEDICAID

## 2022-06-23 DIAGNOSIS — K80.50 BILIARY COLIC: ICD-10-CM

## 2022-06-23 PROCEDURE — 76705 ECHO EXAM OF ABDOMEN: CPT

## 2022-08-13 ENCOUNTER — HOSPITAL ENCOUNTER (EMERGENCY)
Age: 22
Discharge: ANOTHER HOSPITAL | End: 2022-08-13
Attending: FAMILY MEDICINE
Payer: MEDICAID

## 2022-08-13 ENCOUNTER — HOSPITAL ENCOUNTER (INPATIENT)
Age: 22
LOS: 3 days | Discharge: HOME | DRG: 282 | End: 2022-08-16
Attending: INTERNAL MEDICINE | Admitting: INTERNAL MEDICINE
Payer: MEDICAID

## 2022-08-13 ENCOUNTER — APPOINTMENT (OUTPATIENT)
Dept: CT IMAGING | Age: 22
End: 2022-08-13
Payer: MEDICAID

## 2022-08-13 VITALS
HEART RATE: 87 BPM | BODY MASS INDEX: 23.1 KG/M2 | RESPIRATION RATE: 20 BRPM | OXYGEN SATURATION: 98 % | HEIGHT: 74 IN | WEIGHT: 180 LBS | TEMPERATURE: 98.6 F | DIASTOLIC BLOOD PRESSURE: 98 MMHG | SYSTOLIC BLOOD PRESSURE: 151 MMHG

## 2022-08-13 DIAGNOSIS — K85.90 ACUTE PANCREATITIS, UNSPECIFIED COMPLICATION STATUS, UNSPECIFIED PANCREATITIS TYPE: ICD-10-CM

## 2022-08-13 DIAGNOSIS — K85.20 ALCOHOL INDUCED ACUTE PANCREATITIS WITHOUT NECROSIS OR INFECTION: ICD-10-CM

## 2022-08-13 DIAGNOSIS — K85.10 ACUTE BILIARY PANCREATITIS WITHOUT INFECTION OR NECROSIS: ICD-10-CM

## 2022-08-13 DIAGNOSIS — K85.10 GALLSTONE PANCREATITIS: Primary | ICD-10-CM

## 2022-08-13 DIAGNOSIS — K80.50 CHOLEDOCHOLITHIASIS: Primary | ICD-10-CM

## 2022-08-13 LAB
ABSOLUTE EOS #: 0 K/UL (ref 0–0.4)
ABSOLUTE LYMPH #: 3.7 K/UL (ref 1.2–5.2)
ABSOLUTE MONO #: 0.6 K/UL (ref 0–1)
ACETAMINOPHEN LEVEL: <5 UG/ML (ref 10–30)
ALBUMIN SERPL-MCNC: 4.9 G/DL (ref 3.5–5.2)
ALP BLD-CCNC: 148 U/L (ref 40–129)
ALT SERPL-CCNC: 629 U/L (ref 5–41)
AMPHETAMINE SCREEN URINE: POSITIVE
ANION GAP SERPL CALCULATED.3IONS-SCNC: 14 MMOL/L (ref 9–17)
ANION GAP SERPL CALCULATED.3IONS-SCNC: 21 MMOL/L (ref 9–17)
AST SERPL-CCNC: 436 U/L
BARBITURATE SCREEN URINE: NEGATIVE
BASOPHILS # BLD: 0 % (ref 0–2)
BASOPHILS ABSOLUTE: 0 K/UL (ref 0–0.2)
BENZODIAZEPINE SCREEN, URINE: NEGATIVE
BILIRUB SERPL-MCNC: 3.47 MG/DL (ref 0.3–1.2)
BUN BLDV-MCNC: 11 MG/DL (ref 6–20)
BUN BLDV-MCNC: 6 MG/DL (ref 6–20)
CALCIUM SERPL-MCNC: 8.5 MG/DL (ref 8.6–10.4)
CALCIUM SERPL-MCNC: 9.9 MG/DL (ref 8.6–10.4)
CANNABINOID SCREEN URINE: POSITIVE
CHLORIDE BLD-SCNC: 103 MMOL/L (ref 98–107)
CHLORIDE BLD-SCNC: 95 MMOL/L (ref 98–107)
CO2: 20 MMOL/L (ref 20–31)
CO2: 22 MMOL/L (ref 20–31)
COCAINE METABOLITE, URINE: NEGATIVE
CREAT SERPL-MCNC: 0.52 MG/DL (ref 0.7–1.2)
CREAT SERPL-MCNC: 0.91 MG/DL (ref 0.7–1.2)
DIFFERENTIAL TYPE: YES
EOSINOPHILS RELATIVE PERCENT: 0 % (ref 0–5)
ETHANOL PERCENT: <0.01 %
ETHANOL: <10 MG/DL
GFR NON-AFRICAN AMERICAN: ABNORMAL ML/MIN
GFR NON-AFRICAN AMERICAN: ABNORMAL ML/MIN
GFR SERPL CREATININE-BSD FRML MDRD: ABNORMAL ML/MIN/{1.73_M2}
GFR SERPL CREATININE-BSD FRML MDRD: ABNORMAL ML/MIN/{1.73_M2}
GLUCOSE BLD-MCNC: 176 MG/DL (ref 70–99)
GLUCOSE BLD-MCNC: 83 MG/DL (ref 70–99)
HCT VFR BLD CALC: 50.2 % (ref 41–53)
HEMOGLOBIN: 17 G/DL (ref 13.5–17.5)
INR BLD: 1
LIPASE: >3000 U/L (ref 13–60)
LYMPHOCYTES # BLD: 19 % (ref 13–44)
MAGNESIUM: 2 MG/DL (ref 1.7–2.2)
MCH RBC QN AUTO: 29.6 PG (ref 26–34)
MCHC RBC AUTO-ENTMCNC: 33.9 G/DL (ref 31–37)
MCV RBC AUTO: 87.6 FL (ref 80–100)
METHADONE SCREEN, URINE: NEGATIVE
METHAMPHETAMINE, URINE: POSITIVE
MONOCYTES # BLD: 3 % (ref 5–9)
OPIATES, URINE: NEGATIVE
OXYCODONE SCREEN URINE: NEGATIVE
PARTIAL THROMBOPLASTIN TIME: 24 SEC (ref 23.9–33.8)
PDW BLD-RTO: 13.1 % (ref 12.1–15.2)
PHENCYCLIDINE, URINE: NEGATIVE
PLATELET # BLD: 339 K/UL (ref 140–450)
POTASSIUM SERPL-SCNC: 2.9 MMOL/L (ref 3.7–5.3)
POTASSIUM SERPL-SCNC: 3.8 MMOL/L (ref 3.7–5.3)
PROPOXYPHENE, URINE: NEGATIVE
PROTHROMBIN TIME: 13.4 SEC (ref 11.5–14.2)
RBC # BLD: 5.73 M/UL (ref 4.5–5.9)
SALICYLATE LEVEL: <1 MG/DL (ref 3–10)
SARS-COV-2, RAPID: NOT DETECTED
SEG NEUTROPHILS: 78 % (ref 39–75)
SEGMENTED NEUTROPHILS ABSOLUTE COUNT: 15 K/UL (ref 2.1–6.5)
SODIUM BLD-SCNC: 137 MMOL/L (ref 135–144)
SODIUM BLD-SCNC: 138 MMOL/L (ref 135–144)
SPECIMEN DESCRIPTION: NORMAL
TOTAL PROTEIN: 7.2 G/DL (ref 6.4–8.3)
TRICYCLIC ANTIDEPRESSANTS, UR: NEGATIVE
TROPONIN, HIGH SENSITIVITY: <6 NG/L (ref 0–22)
WBC # BLD: 19.4 K/UL (ref 4.5–13.5)

## 2022-08-13 PROCEDURE — 96376 TX/PRO/DX INJ SAME DRUG ADON: CPT

## 2022-08-13 PROCEDURE — 6360000002 HC RX W HCPCS: Performed by: FAMILY MEDICINE

## 2022-08-13 PROCEDURE — 6370000000 HC RX 637 (ALT 250 FOR IP): Performed by: STUDENT IN AN ORGANIZED HEALTH CARE EDUCATION/TRAINING PROGRAM

## 2022-08-13 PROCEDURE — 2580000003 HC RX 258: Performed by: STUDENT IN AN ORGANIZED HEALTH CARE EDUCATION/TRAINING PROGRAM

## 2022-08-13 PROCEDURE — 80306 DRUG TEST PRSMV INSTRMNT: CPT

## 2022-08-13 PROCEDURE — 93005 ELECTROCARDIOGRAM TRACING: CPT | Performed by: FAMILY MEDICINE

## 2022-08-13 PROCEDURE — 99285 EMERGENCY DEPT VISIT HI MDM: CPT

## 2022-08-13 PROCEDURE — 80179 DRUG ASSAY SALICYLATE: CPT

## 2022-08-13 PROCEDURE — 85025 COMPLETE CBC W/AUTO DIFF WBC: CPT

## 2022-08-13 PROCEDURE — 6360000004 HC RX CONTRAST MEDICATION: Performed by: FAMILY MEDICINE

## 2022-08-13 PROCEDURE — 80143 DRUG ASSAY ACETAMINOPHEN: CPT

## 2022-08-13 PROCEDURE — 2580000003 HC RX 258: Performed by: FAMILY MEDICINE

## 2022-08-13 PROCEDURE — 87635 SARS-COV-2 COVID-19 AMP PRB: CPT

## 2022-08-13 PROCEDURE — 36415 COLL VENOUS BLD VENIPUNCTURE: CPT

## 2022-08-13 PROCEDURE — 99222 1ST HOSP IP/OBS MODERATE 55: CPT | Performed by: STUDENT IN AN ORGANIZED HEALTH CARE EDUCATION/TRAINING PROGRAM

## 2022-08-13 PROCEDURE — 80048 BASIC METABOLIC PNL TOTAL CA: CPT

## 2022-08-13 PROCEDURE — 84484 ASSAY OF TROPONIN QUANT: CPT

## 2022-08-13 PROCEDURE — 74177 CT ABD & PELVIS W/CONTRAST: CPT

## 2022-08-13 PROCEDURE — 2580000003 HC RX 258: Performed by: INTERNAL MEDICINE

## 2022-08-13 PROCEDURE — 2060000000 HC ICU INTERMEDIATE R&B

## 2022-08-13 PROCEDURE — 6360000002 HC RX W HCPCS: Performed by: STUDENT IN AN ORGANIZED HEALTH CARE EDUCATION/TRAINING PROGRAM

## 2022-08-13 PROCEDURE — 6360000002 HC RX W HCPCS: Performed by: INTERNAL MEDICINE

## 2022-08-13 PROCEDURE — 83735 ASSAY OF MAGNESIUM: CPT

## 2022-08-13 PROCEDURE — 96375 TX/PRO/DX INJ NEW DRUG ADDON: CPT

## 2022-08-13 PROCEDURE — 83690 ASSAY OF LIPASE: CPT

## 2022-08-13 PROCEDURE — 96374 THER/PROPH/DIAG INJ IV PUSH: CPT

## 2022-08-13 PROCEDURE — 99232 SBSQ HOSP IP/OBS MODERATE 35: CPT | Performed by: STUDENT IN AN ORGANIZED HEALTH CARE EDUCATION/TRAINING PROGRAM

## 2022-08-13 PROCEDURE — G0480 DRUG TEST DEF 1-7 CLASSES: HCPCS

## 2022-08-13 PROCEDURE — C9803 HOPD COVID-19 SPEC COLLECT: HCPCS

## 2022-08-13 PROCEDURE — 80053 COMPREHEN METABOLIC PANEL: CPT

## 2022-08-13 PROCEDURE — 85730 THROMBOPLASTIN TIME PARTIAL: CPT

## 2022-08-13 PROCEDURE — 85610 PROTHROMBIN TIME: CPT

## 2022-08-13 RX ORDER — FENTANYL CITRATE 50 UG/ML
50 INJECTION, SOLUTION INTRAMUSCULAR; INTRAVENOUS ONCE
Status: COMPLETED | OUTPATIENT
Start: 2022-08-13 | End: 2022-08-13

## 2022-08-13 RX ORDER — ACETAMINOPHEN 650 MG/1
650 SUPPOSITORY RECTAL EVERY 6 HOURS PRN
Status: DISCONTINUED | OUTPATIENT
Start: 2022-08-13 | End: 2022-08-16 | Stop reason: HOSPADM

## 2022-08-13 RX ORDER — 0.9 % SODIUM CHLORIDE 0.9 %
1000 INTRAVENOUS SOLUTION INTRAVENOUS ONCE
Status: COMPLETED | OUTPATIENT
Start: 2022-08-13 | End: 2022-08-13

## 2022-08-13 RX ORDER — PROCHLORPERAZINE EDISYLATE 5 MG/ML
10 INJECTION INTRAMUSCULAR; INTRAVENOUS ONCE
Status: COMPLETED | OUTPATIENT
Start: 2022-08-13 | End: 2022-08-13

## 2022-08-13 RX ORDER — ACETAMINOPHEN 325 MG/1
650 TABLET ORAL EVERY 6 HOURS PRN
Status: DISCONTINUED | OUTPATIENT
Start: 2022-08-13 | End: 2022-08-16 | Stop reason: HOSPADM

## 2022-08-13 RX ORDER — ONDANSETRON 2 MG/ML
4 INJECTION INTRAMUSCULAR; INTRAVENOUS ONCE
Status: COMPLETED | OUTPATIENT
Start: 2022-08-13 | End: 2022-08-13

## 2022-08-13 RX ORDER — POTASSIUM CHLORIDE 7.45 MG/ML
10 INJECTION INTRAVENOUS PRN
Status: DISCONTINUED | OUTPATIENT
Start: 2022-08-13 | End: 2022-08-16 | Stop reason: HOSPADM

## 2022-08-13 RX ORDER — NICOTINE 21 MG/24HR
1 PATCH, TRANSDERMAL 24 HOURS TRANSDERMAL DAILY
Status: DISCONTINUED | OUTPATIENT
Start: 2022-08-13 | End: 2022-08-16 | Stop reason: HOSPADM

## 2022-08-13 RX ORDER — POTASSIUM CHLORIDE 7.45 MG/ML
10 INJECTION INTRAVENOUS PRN
Status: DISCONTINUED | OUTPATIENT
Start: 2022-08-13 | End: 2022-08-13

## 2022-08-13 RX ORDER — SODIUM CHLORIDE 0.9 % (FLUSH) 0.9 %
5-40 SYRINGE (ML) INJECTION PRN
Status: DISCONTINUED | OUTPATIENT
Start: 2022-08-13 | End: 2022-08-16 | Stop reason: HOSPADM

## 2022-08-13 RX ORDER — SODIUM CHLORIDE 0.9 % (FLUSH) 0.9 %
5-40 SYRINGE (ML) INJECTION EVERY 12 HOURS SCHEDULED
Status: DISCONTINUED | OUTPATIENT
Start: 2022-08-13 | End: 2022-08-16 | Stop reason: HOSPADM

## 2022-08-13 RX ORDER — SODIUM CHLORIDE, SODIUM LACTATE, POTASSIUM CHLORIDE, CALCIUM CHLORIDE 600; 310; 30; 20 MG/100ML; MG/100ML; MG/100ML; MG/100ML
INJECTION, SOLUTION INTRAVENOUS CONTINUOUS
Status: DISCONTINUED | OUTPATIENT
Start: 2022-08-13 | End: 2022-08-16 | Stop reason: HOSPADM

## 2022-08-13 RX ORDER — SODIUM CHLORIDE 9 MG/ML
INJECTION, SOLUTION INTRAVENOUS PRN
Status: DISCONTINUED | OUTPATIENT
Start: 2022-08-13 | End: 2022-08-16 | Stop reason: HOSPADM

## 2022-08-13 RX ORDER — ONDANSETRON 4 MG/1
4 TABLET, ORALLY DISINTEGRATING ORAL EVERY 8 HOURS PRN
Status: DISCONTINUED | OUTPATIENT
Start: 2022-08-13 | End: 2022-08-16 | Stop reason: HOSPADM

## 2022-08-13 RX ORDER — KETOROLAC TROMETHAMINE 30 MG/ML
30 INJECTION, SOLUTION INTRAMUSCULAR; INTRAVENOUS EVERY 6 HOURS PRN
Status: DISCONTINUED | OUTPATIENT
Start: 2022-08-13 | End: 2022-08-16 | Stop reason: HOSPADM

## 2022-08-13 RX ORDER — SODIUM CHLORIDE 9 MG/ML
INJECTION, SOLUTION INTRAVENOUS CONTINUOUS
Status: DISCONTINUED | OUTPATIENT
Start: 2022-08-13 | End: 2022-08-13 | Stop reason: HOSPADM

## 2022-08-13 RX ORDER — ENOXAPARIN SODIUM 100 MG/ML
40 INJECTION SUBCUTANEOUS DAILY
Status: DISCONTINUED | OUTPATIENT
Start: 2022-08-13 | End: 2022-08-16 | Stop reason: HOSPADM

## 2022-08-13 RX ORDER — POLYETHYLENE GLYCOL 3350 17 G/17G
17 POWDER, FOR SOLUTION ORAL DAILY PRN
Status: DISCONTINUED | OUTPATIENT
Start: 2022-08-13 | End: 2022-08-16 | Stop reason: HOSPADM

## 2022-08-13 RX ORDER — FENTANYL CITRATE 50 UG/ML
100 INJECTION, SOLUTION INTRAMUSCULAR; INTRAVENOUS ONCE
Status: COMPLETED | OUTPATIENT
Start: 2022-08-13 | End: 2022-08-13

## 2022-08-13 RX ORDER — POTASSIUM CHLORIDE 20 MEQ/1
40 TABLET, EXTENDED RELEASE ORAL PRN
Status: DISCONTINUED | OUTPATIENT
Start: 2022-08-13 | End: 2022-08-16 | Stop reason: HOSPADM

## 2022-08-13 RX ORDER — MAGNESIUM SULFATE 1 G/100ML
1000 INJECTION INTRAVENOUS PRN
Status: DISCONTINUED | OUTPATIENT
Start: 2022-08-13 | End: 2022-08-16 | Stop reason: HOSPADM

## 2022-08-13 RX ORDER — POTASSIUM CHLORIDE 7.45 MG/ML
10 INJECTION INTRAVENOUS ONCE
Status: COMPLETED | OUTPATIENT
Start: 2022-08-13 | End: 2022-08-13

## 2022-08-13 RX ORDER — MAGNESIUM SULFATE 1 G/100ML
1000 INJECTION INTRAVENOUS PRN
Status: DISCONTINUED | OUTPATIENT
Start: 2022-08-13 | End: 2022-08-13 | Stop reason: SDUPTHER

## 2022-08-13 RX ORDER — ONDANSETRON 2 MG/ML
4 INJECTION INTRAMUSCULAR; INTRAVENOUS EVERY 6 HOURS PRN
Status: DISCONTINUED | OUTPATIENT
Start: 2022-08-13 | End: 2022-08-16 | Stop reason: HOSPADM

## 2022-08-13 RX ADMIN — SODIUM CHLORIDE, POTASSIUM CHLORIDE, SODIUM LACTATE AND CALCIUM CHLORIDE: 600; 310; 30; 20 INJECTION, SOLUTION INTRAVENOUS at 20:26

## 2022-08-13 RX ADMIN — IOPAMIDOL 75 ML: 755 INJECTION, SOLUTION INTRAVENOUS at 10:31

## 2022-08-13 RX ADMIN — ENOXAPARIN SODIUM 40 MG: 100 INJECTION SUBCUTANEOUS at 20:09

## 2022-08-13 RX ADMIN — FENTANYL CITRATE 100 MCG: 50 INJECTION, SOLUTION INTRAMUSCULAR; INTRAVENOUS at 12:04

## 2022-08-13 RX ADMIN — SODIUM CHLORIDE 1000 ML: 9 INJECTION, SOLUTION INTRAVENOUS at 09:13

## 2022-08-13 RX ADMIN — ONDANSETRON 4 MG: 2 INJECTION INTRAMUSCULAR; INTRAVENOUS at 10:15

## 2022-08-13 RX ADMIN — SODIUM CHLORIDE, PRESERVATIVE FREE 10 ML: 5 INJECTION INTRAVENOUS at 20:10

## 2022-08-13 RX ADMIN — POTASSIUM CHLORIDE 10 MEQ: 7.46 INJECTION, SOLUTION INTRAVENOUS at 12:08

## 2022-08-13 RX ADMIN — FENTANYL CITRATE 50 MCG: 50 INJECTION, SOLUTION INTRAMUSCULAR; INTRAVENOUS at 10:16

## 2022-08-13 RX ADMIN — KETOROLAC TROMETHAMINE 30 MG: 30 INJECTION, SOLUTION INTRAMUSCULAR; INTRAVENOUS at 20:08

## 2022-08-13 RX ADMIN — ONDANSETRON 4 MG: 2 INJECTION INTRAMUSCULAR; INTRAVENOUS at 20:08

## 2022-08-13 RX ADMIN — SODIUM CHLORIDE: 9 INJECTION, SOLUTION INTRAVENOUS at 12:04

## 2022-08-13 RX ADMIN — PROCHLORPERAZINE EDISYLATE 10 MG: 5 INJECTION INTRAMUSCULAR; INTRAVENOUS at 09:12

## 2022-08-13 ASSESSMENT — PAIN DESCRIPTION - PAIN TYPE: TYPE: ACUTE PAIN

## 2022-08-13 ASSESSMENT — PAIN SCALES - GENERAL
PAINLEVEL_OUTOF10: 10
PAINLEVEL_OUTOF10: 8
PAINLEVEL_OUTOF10: 6
PAINLEVEL_OUTOF10: 3
PAINLEVEL_OUTOF10: 6

## 2022-08-13 ASSESSMENT — PAIN DESCRIPTION - ORIENTATION
ORIENTATION: RIGHT;LOWER
ORIENTATION: RIGHT
ORIENTATION: LEFT;LOWER
ORIENTATION: RIGHT;LEFT;MID

## 2022-08-13 ASSESSMENT — PAIN DESCRIPTION - DESCRIPTORS
DESCRIPTORS: ACHING
DESCRIPTORS: ACHING
DESCRIPTORS: SHARP

## 2022-08-13 ASSESSMENT — PAIN DESCRIPTION - LOCATION
LOCATION: ABDOMEN

## 2022-08-13 ASSESSMENT — PAIN DESCRIPTION - ONSET: ONSET: ON-GOING

## 2022-08-13 ASSESSMENT — PAIN DESCRIPTION - FREQUENCY: FREQUENCY: CONTINUOUS

## 2022-08-13 ASSESSMENT — PAIN - FUNCTIONAL ASSESSMENT: PAIN_FUNCTIONAL_ASSESSMENT: 0-10

## 2022-08-13 NOTE — ED PROVIDER NOTES
eMERGENCY dEPARTMENT eNCOUnter      [address] [facility]    Chief Complaint   Patient presents with    Abdominal Pain     Pt started with abd pain two days ago       HPI    Amara Zhao is a 25 y.o. male who presents severe abdominal pain with nausea and vomiting. Symptoms been present for 2 days. Describes being severe. She has a history of cholelithiasis elevated liver enzymes in the past.  Patient has a history of methamphetamine abuse. He states he last used methamphetamine 3 days ago. He has not taken anything for his pain. REVIEW OF SYSTEMS    All systems reviewed and positives are in the HPI. PAST MEDICAL HISTORY    Past Medical History:   Diagnosis Date    Drug abuse ([address]        SURGICAL HISTORY    Past Surgical History:   Procedure Laterality Date    TYMPANOSTOMY TUBE PLACEMENT         CURRENT MEDICATIONS        ALLERGIES    Allergies   Allergen Reactions    Augmentin [Amoxicillin-Pot Clavulanate]        FAMILY HISTORY    History reviewed. No pertinent family history.     SOCIAL HISTORY    Social History     Socioeconomic History    Marital status: Single     Spouse name: None    Number of children: None    Years of education: None    Highest education level: None   Tobacco Use    Smoking status: Every Day     Packs/day: 1.00     Years: 4.00     Pack years: 4.00     Types: Cigarettes     Start date: 2017    Smokeless tobacco: Never   Vaping Use    Vaping Use: Former   Substance and Sexual Activity    Alcohol use: Not Currently    Drug use: Not Currently     Types: Marijuana (Weed), Methamphetamines (Crystal Meth)       PHYSICAL EXAM    VITAL SIGNS: BP (!) 151/98   Pulse 87   Temp 98.6 °F (37 °C) (Oral)   Resp 20   Ht 6' 2\" (1.88 m)   Wt 180 lb (81.6 kg)   SpO2 98%   BMI 23.11 kg/m²   Constitutional:  Well developed, well nourished, severe acute distress, non-toxic appearance   Eyes:  PERRL, conjunctiva normal   HENT:  Atraumatic, external ears normal, nose normal, oropharynx moist. Neck supple   Respiratory:  No respiratory distress, normal breath sounds   Cardiovascular:  Normal rate, normal rhythm, no murmurs, no gallops, no rubs   GI: Soft, generalized tenderness, no masses, bowel sounds positive. : No CVA tenderness  Musculoskeletal:  No edema   Integument:  Well hydrated   Neurologic:  Alert & oriented x 3, no focal deficits     EKG    No acute changes. RADIOLOGY/PROCEDURES    CT of abdomen and pelvis feels enlarged edematous pancreas with adjacent free fluid concerning for acute pancreatitis. Correlate with serum lipase. No evidence of bowel obstruction or thickening. Normal appendix. ED COURSE & MEDICAL DECISION MAKING    Pertinent Labs & Imaging studies reviewed.  (See chart for details)   Labs Reviewed   CBC WITH AUTO DIFFERENTIAL - Abnormal; Notable for the following components:       Result Value    WBC 19.4 (*)     Seg Neutrophils 78 (*)     Monocytes 3 (*)     Segs Absolute 15.00 (*)     All other components within normal limits   COMPREHENSIVE METABOLIC PANEL - Abnormal; Notable for the following components:    Glucose 176 (*)     Potassium 2.9 (*)     Chloride 95 (*)     Anion Gap 21 (*)     Alkaline Phosphatase 148 (*)      (*)      (*)     Total Bilirubin 3.47 (*)     All other components within normal limits   LIPASE - Abnormal; Notable for the following components:    Lipase >3,000 (*)     All other components within normal limits   URINE DRUG SCREEN - Abnormal; Notable for the following components:    Amphetamine Screen, Ur POSITIVE (*)     Cannabinoid Scrn, Ur POSITIVE (*)     Methamphetamine, Urine POSITIVE (*)     All other components within normal limits   SALICYLATE LEVEL - Abnormal; Notable for the following components:    Salicylate Lvl <1 (*)     All other components within normal limits   ACETAMINOPHEN LEVEL - Abnormal; Notable for the following components:    Acetaminophen Level <5 (*)     All other components within normal limits COVID-19, RAPID   APTT   TROPONIN   PROTIME-INR   ETHANOL     Patient will be transferred to [facility] with , and he accepted transfer. FINAL IMPRESSION    1. Choledocholithiasis  2. Acute pancreatitis    Summation      Patient Course: Patient will be transferred to [facility].  I spoke with , and he accepted transfer. ED Medications administered this visit:    Medications   0.9 % sodium chloride infusion ( IntraVENous New Bag 8/13/22 1204)   prochlorperazine (COMPAZINE) injection 10 mg (10 mg IntraVENous Given 8/13/22 0912)   0.9 % sodium chloride bolus (0 mLs IntraVENous Stopped 8/13/22 1013)   potassium chloride 10 mEq/100 mL IVPB (Peripheral Line) (10 mEq IntraVENous New Bag 8/13/22 1208)   iopamidol (ISOVUE-370) 76 % injection 75 mL (75 mLs IntraVENous Given 8/13/22 1031)   fentaNYL (SUBLIMAZE) injection 50 mcg (50 mcg IntraVENous Given 8/13/22 1016)   ondansetron (ZOFRAN) injection 4 mg (4 mg IntraVENous Given 8/13/22 1015)   fentaNYL (SUBLIMAZE) injection 100 mcg (100 mcg IntraVENous Given 8/13/22 1204)       New Prescriptions from this visit:    New Prescriptions    No medications on file       Follow-up:  No follow-up provider specified. Final Impression:   1. Choledocholithiasis    2.  Acute biliary pancreatitis without infection or necrosis               (Please note that portions of this note were completed with a voice recognition program.  Efforts were made to edit the dictations but occasionally words are mis-transcribed.)      Reagan Hyde MD  08/13/22 [id number]

## 2022-08-13 NOTE — H&P
[facility]  Office: [address], DO, Titi Wallace, DO, Pascale Morris, DO, Edda Aye Blood, DO, Tali Jorgensen MD, Destiny Rowland MD, Aleks Sousa MD, Torsten Guillen MD,  Tc Gorman MD, Lidia Curiel MD, Surya Skaggs, DO, Juan Manuel Moore MD,  Edgar Hess MD, Samanta Corrales MD, Gerry Do DO, Wilmer Ortiz MD, Nya Roach MD, Kilo Resendiz MD, Leah Mccartney DO, Syed Shrestha MD, Brandon Grant MD, Tee Lu, CNP,  Terri Addison, CNP, Fan Cesar, CNP, Johnny Solo, CNP, Ming Garcia PA-C, Cleone Denver, SALLY, Debra Flower, CNP, Ruddy Cervantes, CNP, Nemo Dye, CNP, Rakan Shea, CNP, Tawny Lujan, CNP, Omar Barnhart, CNS, Ronald Tello, [facility], Sunil Bailon, CNP, Kristen Bangura, CNP, Sheri Escalona, CNP           [facility]   [address] [facility]    HISTORY AND PHYSICAL EXAMINATION            Date:   8/13/2022  Patient name:  Mello Peralat  Date of admission:  8/13/2022  6:22 PM  MRN:   [id number]  Account:  [de-identified]  YOB: 2000  PCP:    No primary care provider on file. Room:   [address] [place] [address]  Code Status:    Full Code    Chief Complaint:     Abd pain vomiting    History Obtained From:     patient    History of Present Illness:     Esperanza Cordoba is a 25 y.o. Non- / non  male who presents with No chief complaint on file. and is admitted to the hospital for the management of <principal problem not specified>.     25 M w hx of methamphetamine abuse presented to [facility] ED for intractable nausea vomiting abdominal pain found to have a lipase of greater than 3000  He had no evidence of ROA  He was hypokalemic    Bilirubin also elevated was not fractionated  CT abdomen pelvis was performed no evidence of necrosis    He does have a history of gallstones was supposed to have cholecystectomy performed in May but due to concurrent drug use this lightheadedness, numbness, pain, tingling extremities  BEHAVIOR/PSYCH:  (+) depression anxiety    Physical Exam:   /89   Pulse 91   Temp 98.2 °F (36.8 °C) (Oral)   Resp 19   Ht 6' 2\" (1.88 m)   Wt 203 lb 14.8 oz (92.5 kg)   BMI 26.18 kg/m²   Temp (24hrs), Av.4 °F (36.9 °C), Min:98.2 °F (36.8 °C), Max:98.6 °F (37 °C)    No results for input(s): POCGLU in the last 72 hours.   No intake or output data in the 24 hours ending 22 1840    General Appearance: alert, well appearing, and in no acute distress  Mental status: oriented to person, place, and time  Head: normocephalic, atraumatic  Eye: no icterus, redness, pupils equal and reactive, extraocular eye movements intact, conjunctiva clear  Ear: normal external ear, no discharge, hearing intact  Nose: no drainage noted  Mouth: mucous membranes moist  Neck: supple, no carotid bruits, thyroid not palpable  Lungs: Bilateral equal air entry, clear to ausculation, no wheezing, rales or rhonchi, normal effort  Cardiovascular: normal rate, regular rhythm, no murmur, gallop, rub  Abdomen: tender to moderate palpation, but no evidence of surgical abd  Neurologic: There are no new focal motor or sensory deficits, normal muscle tone and bulk, no abnormal sensation, normal speech, cranial nerves II through XII grossly intact  Skin: No gross lesions, rashes, bruising or bleeding on exposed skin area  Extremities: peripheral pulses palpable, no pedal edema or calf pain with palpation  Psych: normal affect    Investigations:      Laboratory Testing:  Recent Results (from the past 24 hour(s))   EKG 12 Lead    Collection Time: 22  9:13 AM   Result Value Ref Range    Ventricular Rate 94 BPM    Atrial Rate 94 BPM    P-R Interval 156 ms    QRS Duration 104 ms    Q-T Interval 390 ms    QTc Calculation (Bazett) 487 ms    P Axis 76 degrees    R Axis 40 degrees    T Axis 48 degrees   APTT    Collection Time: 22  9:15 AM   Result Value Ref Range    PTT 24.0 23.9 - 33.8 sec   CBC with Auto Differential    Collection Time: 08/13/22  9:15 AM   Result Value Ref Range    WBC 19.4 (H) 4.5 - 13.5 k/uL    RBC 5.73 4.5 - 5.9 m/uL    Hemoglobin 17.0 13.5 - 17.5 g/dL    Hematocrit 50.2 41 - 53 %    MCV 87.6 80 - 100 fL    MCH 29.6 26 - 34 pg    MCHC 33.9 31 - 37 g/dL    RDW 13.1 12.1 - 15.2 %    Platelets 665 574 - 720 k/uL    Differential Type YES     Seg Neutrophils 78 (H) 39 - 75 %    Lymphocytes 19 13 - 44 %    Monocytes 3 (L) 5 - 9 %    Eosinophils % 0 0 - 5 %    Basophils 0 0 - 2 %    Segs Absolute 15.00 (H) 2.1 - 6.5 k/uL    Absolute Lymph # 3.70 1.2 - 5.2 k/uL    Absolute Mono # 0.60 0.0 - 1.0 k/uL    Absolute Eos # 0.00 0.0 - 0.4 k/uL    Basophils Absolute 0.00 0.0 - 0.2 k/uL   Comprehensive Metabolic Panel    Collection Time: 08/13/22  9:15 AM   Result Value Ref Range    Glucose 176 (H) 70 - 99 mg/dL    BUN 11 6 - 20 mg/dL    Creatinine 0.91 0.70 - 1.20 mg/dL    Calcium 9.9 8.6 - 10.4 mg/dL    Sodium 138 135 - 144 mmol/L    Potassium 2.9 (LL) 3.7 - 5.3 mmol/L    Chloride 95 (L) 98 - 107 mmol/L    CO2 22 20 - 31 mmol/L    Anion Gap 21 (H) 9 - 17 mmol/L    Alkaline Phosphatase 148 (H) 40 - 129 U/L     (H) 5 - 41 U/L     (H) <40 U/L    Total Bilirubin 3.47 (H) 0.30 - 1.20 mg/dL    Total Protein 7.2 6.4 - 8.3 g/dL    Albumin 4.9 3.5 - 5.2 g/dL    GFR Non-African American  >60 mL/min     Pediatric GFR requires additional information. Refer to [facility] website for calculator.     GFR Comment         Troponin    Collection Time: 08/13/22  9:15 AM   Result Value Ref Range    Troponin, High Sensitivity <6 0 - 22 ng/L   Protime-INR    Collection Time: 08/13/22  9:15 AM   Result Value Ref Range    Protime 13.4 11.5 - 14.2 sec    INR 1.0    Lipase    Collection Time: 08/13/22  9:15 AM   Result Value Ref Range    Lipase >3,000 (HH) 13 - 60 U/L   Drug screen multi urine    Collection Time: 08/13/22  9:15 AM   Result Value Ref Range    Amphetamine Screen, Ur POSITIVE (A) NEGATIVE    Barbiturate Screen, Ur NEGATIVE NEGATIVE    Benzodiazepine Screen, Urine NEGATIVE NEGATIVE    Cocaine Metabolite, Urine NEGATIVE NEGATIVE    Methadone Screen, Urine NEGATIVE NEGATIVE    Opiates, Urine NEGATIVE NEGATIVE    Phencyclidine, Urine NEGATIVE NEGATIVE    Propoxyphene, Urine NEGATIVE NEGATIVE    Cannabinoid Scrn, Ur POSITIVE (A) NEGATIVE    Oxycodone Screen, Ur NEGATIVE NEGATIVE    Methamphetamine, Urine POSITIVE (A) NEGATIVE    Tricyclic Antidepressants, Urine NEGATIVE NEGATIVE   Salicylate    Collection Time: 08/13/22  9:15 AM   Result Value Ref Range    Salicylate Lvl <1 (L) 3 - 10 mg/dL   Acetaminophen Level    Collection Time: 08/13/22  9:15 AM   Result Value Ref Range    Acetaminophen Level <5 (L) 10 - 30 ug/mL   Ethanol    Collection Time: 08/13/22  9:15 AM   Result Value Ref Range    Ethanol <10 <10 mg/dL    Ethanol percent <0.010 %   COVID-19, Rapid    Collection Time: 08/13/22  1:26 PM    Specimen: Nasopharyngeal Swab   Result Value Ref Range    Specimen Description . NASOPHARYNGEAL SWAB     SARS-CoV-2, Rapid Not Detected Not Detected       Imaging/Diagnostics:  CT ABDOMEN PELVIS W IV CONTRAST Additional Contrast? None    Result Date: 8/13/2022  Enlarged edematous pancreas with adjacent free fluid concerning for acute pancreatitis. Correlate with serum lipase. No evidence of bowel obstruction or thickening. Normal appendix.        Assessment :      Hospital Problems             Last Modified POA    Acute pancreatitis, unspecified complication status, unspecified pancreatitis type 8/13/2022 Yes    Alcohol induced acute pancreatitis without necrosis or infection 8/13/2022 Yes       Plan:     Patient status inpatient in the Med/Surge    Severe pancreatitis  Elevated lipase  Start aggressive fluids LR  GI consult  Given hx of gallstones, may be gallstone pancreatitis  Does have amphetamine and methamphetamine in UDS - no evidence of etoh  Zofran emesis  Avoid narcotics given drug abuse hx - toradol may be beneficial  Npo for now - once ready to eat will slowly advance diet   Daily amylase lipase  Cholestatic liver injury pattern  May need surgery consult or at least op follow up for lap juan    Consultations:   None    Patient is admitted as inpatient status because of co-morbidities listed above, severity of signs and symptoms as outlined, requirement for current medical therapies and most importantly because of direct risk to patient if care not provided in a hospital setting. Expected length of stay > 48 hours. Ana Wallis MD  8/13/2022  6:40 PM    Copy sent to Dr. Person primary care provider on file.

## 2022-08-14 ENCOUNTER — APPOINTMENT (OUTPATIENT)
Dept: ULTRASOUND IMAGING | Age: 22
DRG: 282 | End: 2022-08-14
Attending: INTERNAL MEDICINE
Payer: MEDICAID

## 2022-08-14 ENCOUNTER — APPOINTMENT (OUTPATIENT)
Dept: GENERAL RADIOLOGY | Age: 22
DRG: 282 | End: 2022-08-14
Attending: INTERNAL MEDICINE
Payer: MEDICAID

## 2022-08-14 LAB
ALBUMIN SERPL-MCNC: 3.9 G/DL (ref 3.5–5.2)
ALBUMIN/GLOBULIN RATIO: 1.9 (ref 1–2.5)
ALP BLD-CCNC: 104 U/L (ref 40–129)
ALT SERPL-CCNC: 291 U/L (ref 5–41)
AMYLASE: 484 U/L (ref 28–100)
ANION GAP SERPL CALCULATED.3IONS-SCNC: 13 MMOL/L (ref 9–17)
AST SERPL-CCNC: 76 U/L
BILIRUB SERPL-MCNC: 0.91 MG/DL (ref 0.3–1.2)
BUN BLDV-MCNC: 6 MG/DL (ref 6–20)
CALCIUM SERPL-MCNC: 8.3 MG/DL (ref 8.6–10.4)
CHLORIDE BLD-SCNC: 101 MMOL/L (ref 98–107)
CO2: 22 MMOL/L (ref 20–31)
CREAT SERPL-MCNC: 0.49 MG/DL (ref 0.7–1.2)
EKG ATRIAL RATE: 94 BPM
EKG P AXIS: 76 DEGREES
EKG P-R INTERVAL: 156 MS
EKG Q-T INTERVAL: 390 MS
EKG QRS DURATION: 104 MS
EKG QTC CALCULATION (BAZETT): 487 MS
EKG R AXIS: 40 DEGREES
EKG T AXIS: 48 DEGREES
EKG VENTRICULAR RATE: 94 BPM
GFR NON-AFRICAN AMERICAN: ABNORMAL ML/MIN
GFR SERPL CREATININE-BSD FRML MDRD: ABNORMAL ML/MIN/{1.73_M2}
GLUCOSE BLD-MCNC: 99 MG/DL (ref 70–99)
HCT VFR BLD CALC: 45.1 % (ref 40.7–50.3)
HEMOGLOBIN: 14.8 G/DL (ref 13–17)
LIPASE: 527 U/L (ref 13–60)
MAGNESIUM: 1.7 MG/DL (ref 1.7–2.2)
MCH RBC QN AUTO: 30.2 PG (ref 25.2–33.5)
MCHC RBC AUTO-ENTMCNC: 32.8 G/DL (ref 28.4–34.8)
MCV RBC AUTO: 92 FL (ref 82.6–102.9)
NRBC AUTOMATED: 0 PER 100 WBC
PDW BLD-RTO: 12.5 % (ref 11.8–14.4)
PLATELET # BLD: 176 K/UL (ref 138–453)
PMV BLD AUTO: 10.5 FL (ref 8.1–13.5)
POTASSIUM SERPL-SCNC: 3.4 MMOL/L (ref 3.7–5.3)
RBC # BLD: 4.9 M/UL (ref 4.21–5.77)
SODIUM BLD-SCNC: 136 MMOL/L (ref 135–144)
TOTAL PROTEIN: 6 G/DL (ref 6.4–8.3)
TRIGL SERPL-MCNC: 81 MG/DL
WBC # BLD: 14.8 K/UL (ref 4.5–13.5)

## 2022-08-14 PROCEDURE — 76705 ECHO EXAM OF ABDOMEN: CPT

## 2022-08-14 PROCEDURE — 2060000000 HC ICU INTERMEDIATE R&B

## 2022-08-14 PROCEDURE — 6360000002 HC RX W HCPCS: Performed by: NURSE PRACTITIONER

## 2022-08-14 PROCEDURE — 94761 N-INVAS EAR/PLS OXIMETRY MLT: CPT

## 2022-08-14 PROCEDURE — 85027 COMPLETE CBC AUTOMATED: CPT

## 2022-08-14 PROCEDURE — 71045 X-RAY EXAM CHEST 1 VIEW: CPT

## 2022-08-14 PROCEDURE — 2580000003 HC RX 258: Performed by: STUDENT IN AN ORGANIZED HEALTH CARE EDUCATION/TRAINING PROGRAM

## 2022-08-14 PROCEDURE — 93010 ELECTROCARDIOGRAM REPORT: CPT | Performed by: INTERNAL MEDICINE

## 2022-08-14 PROCEDURE — 6360000002 HC RX W HCPCS: Performed by: CLINICAL NURSE SPECIALIST

## 2022-08-14 PROCEDURE — 6360000002 HC RX W HCPCS: Performed by: INTERNAL MEDICINE

## 2022-08-14 PROCEDURE — 36415 COLL VENOUS BLD VENIPUNCTURE: CPT

## 2022-08-14 PROCEDURE — 6370000000 HC RX 637 (ALT 250 FOR IP): Performed by: STUDENT IN AN ORGANIZED HEALTH CARE EDUCATION/TRAINING PROGRAM

## 2022-08-14 PROCEDURE — 2580000003 HC RX 258: Performed by: NURSE PRACTITIONER

## 2022-08-14 PROCEDURE — 83690 ASSAY OF LIPASE: CPT

## 2022-08-14 PROCEDURE — 6360000002 HC RX W HCPCS: Performed by: STUDENT IN AN ORGANIZED HEALTH CARE EDUCATION/TRAINING PROGRAM

## 2022-08-14 PROCEDURE — 80053 COMPREHEN METABOLIC PANEL: CPT

## 2022-08-14 PROCEDURE — 83735 ASSAY OF MAGNESIUM: CPT

## 2022-08-14 PROCEDURE — 99221 1ST HOSP IP/OBS SF/LOW 40: CPT | Performed by: NURSE PRACTITIONER

## 2022-08-14 PROCEDURE — 86038 ANTINUCLEAR ANTIBODIES: CPT

## 2022-08-14 PROCEDURE — 84478 ASSAY OF TRIGLYCERIDES: CPT

## 2022-08-14 PROCEDURE — 82150 ASSAY OF AMYLASE: CPT

## 2022-08-14 PROCEDURE — 86225 DNA ANTIBODY NATIVE: CPT

## 2022-08-14 PROCEDURE — 82787 IGG 1 2 3 OR 4 EACH: CPT

## 2022-08-14 PROCEDURE — 99232 SBSQ HOSP IP/OBS MODERATE 35: CPT | Performed by: STUDENT IN AN ORGANIZED HEALTH CARE EDUCATION/TRAINING PROGRAM

## 2022-08-14 RX ORDER — OXYCODONE HYDROCHLORIDE AND ACETAMINOPHEN 5; 325 MG/1; MG/1
1 TABLET ORAL EVERY 4 HOURS PRN
Status: DISCONTINUED | OUTPATIENT
Start: 2022-08-14 | End: 2022-08-14

## 2022-08-14 RX ORDER — SODIUM CHLORIDE, SODIUM LACTATE, POTASSIUM CHLORIDE, AND CALCIUM CHLORIDE .6; .31; .03; .02 G/100ML; G/100ML; G/100ML; G/100ML
2000 INJECTION, SOLUTION INTRAVENOUS ONCE
Status: COMPLETED | OUTPATIENT
Start: 2022-08-14 | End: 2022-08-14

## 2022-08-14 RX ORDER — MORPHINE SULFATE 2 MG/ML
2 INJECTION, SOLUTION INTRAMUSCULAR; INTRAVENOUS ONCE
Status: COMPLETED | OUTPATIENT
Start: 2022-08-14 | End: 2022-08-14

## 2022-08-14 RX ORDER — OXYCODONE HYDROCHLORIDE 5 MG/1
5 TABLET ORAL EVERY 4 HOURS PRN
Status: DISCONTINUED | OUTPATIENT
Start: 2022-08-14 | End: 2022-08-16 | Stop reason: HOSPADM

## 2022-08-14 RX ADMIN — POTASSIUM CHLORIDE 10 MEQ: 7.46 INJECTION, SOLUTION INTRAVENOUS at 11:31

## 2022-08-14 RX ADMIN — KETOROLAC TROMETHAMINE 30 MG: 30 INJECTION, SOLUTION INTRAMUSCULAR; INTRAVENOUS at 02:42

## 2022-08-14 RX ADMIN — HYDROMORPHONE HYDROCHLORIDE 0.25 MG: 1 INJECTION, SOLUTION INTRAMUSCULAR; INTRAVENOUS; SUBCUTANEOUS at 10:16

## 2022-08-14 RX ADMIN — SODIUM CHLORIDE, POTASSIUM CHLORIDE, SODIUM LACTATE AND CALCIUM CHLORIDE: 600; 310; 30; 20 INJECTION, SOLUTION INTRAVENOUS at 22:46

## 2022-08-14 RX ADMIN — SODIUM CHLORIDE, POTASSIUM CHLORIDE, SODIUM LACTATE AND CALCIUM CHLORIDE: 600; 310; 30; 20 INJECTION, SOLUTION INTRAVENOUS at 04:49

## 2022-08-14 RX ADMIN — SODIUM CHLORIDE, POTASSIUM CHLORIDE, SODIUM LACTATE AND CALCIUM CHLORIDE 1000 ML: 600; 310; 30; 20 INJECTION, SOLUTION INTRAVENOUS at 18:19

## 2022-08-14 RX ADMIN — SODIUM CHLORIDE, POTASSIUM CHLORIDE, SODIUM LACTATE AND CALCIUM CHLORIDE: 600; 310; 30; 20 INJECTION, SOLUTION INTRAVENOUS at 13:45

## 2022-08-14 RX ADMIN — OXYCODONE 5 MG: 5 TABLET ORAL at 20:12

## 2022-08-14 RX ADMIN — POTASSIUM CHLORIDE 10 MEQ: 7.46 INJECTION, SOLUTION INTRAVENOUS at 12:32

## 2022-08-14 RX ADMIN — MORPHINE SULFATE 2 MG: 2 INJECTION, SOLUTION INTRAMUSCULAR; INTRAVENOUS at 06:22

## 2022-08-14 RX ADMIN — POTASSIUM CHLORIDE 10 MEQ: 7.46 INJECTION, SOLUTION INTRAVENOUS at 10:22

## 2022-08-14 RX ADMIN — OXYCODONE 5 MG: 5 TABLET ORAL at 15:46

## 2022-08-14 RX ADMIN — MORPHINE SULFATE 2 MG: 2 INJECTION, SOLUTION INTRAMUSCULAR; INTRAVENOUS at 01:07

## 2022-08-14 RX ADMIN — SODIUM CHLORIDE, POTASSIUM CHLORIDE, SODIUM LACTATE AND CALCIUM CHLORIDE: 600; 310; 30; 20 INJECTION, SOLUTION INTRAVENOUS at 00:50

## 2022-08-14 RX ADMIN — POTASSIUM CHLORIDE 10 MEQ: 7.46 INJECTION, SOLUTION INTRAVENOUS at 13:44

## 2022-08-14 RX ADMIN — KETOROLAC TROMETHAMINE 30 MG: 30 INJECTION, SOLUTION INTRAMUSCULAR; INTRAVENOUS at 12:41

## 2022-08-14 RX ADMIN — KETOROLAC TROMETHAMINE 30 MG: 30 INJECTION, SOLUTION INTRAMUSCULAR; INTRAVENOUS at 18:23

## 2022-08-14 RX ADMIN — SODIUM CHLORIDE, POTASSIUM CHLORIDE, SODIUM LACTATE AND CALCIUM CHLORIDE: 600; 310; 30; 20 INJECTION, SOLUTION INTRAVENOUS at 08:02

## 2022-08-14 RX ADMIN — OXYCODONE HYDROCHLORIDE AND ACETAMINOPHEN 1 TABLET: 5; 325 TABLET ORAL at 11:39

## 2022-08-14 RX ADMIN — ONDANSETRON 4 MG: 2 INJECTION INTRAMUSCULAR; INTRAVENOUS at 12:41

## 2022-08-14 ASSESSMENT — PAIN SCALES - GENERAL
PAINLEVEL_OUTOF10: 10
PAINLEVEL_OUTOF10: 6
PAINLEVEL_OUTOF10: 6
PAINLEVEL_OUTOF10: 8
PAINLEVEL_OUTOF10: 7
PAINLEVEL_OUTOF10: 6
PAINLEVEL_OUTOF10: 5
PAINLEVEL_OUTOF10: 10
PAINLEVEL_OUTOF10: 5

## 2022-08-14 ASSESSMENT — PAIN DESCRIPTION - DESCRIPTORS
DESCRIPTORS: ACHING
DESCRIPTORS: ACHING
DESCRIPTORS: SHARP

## 2022-08-14 ASSESSMENT — PAIN DESCRIPTION - ORIENTATION
ORIENTATION: RIGHT;LEFT;LOWER
ORIENTATION: RIGHT
ORIENTATION: RIGHT

## 2022-08-14 ASSESSMENT — PAIN DESCRIPTION - LOCATION
LOCATION: ABDOMEN

## 2022-08-14 NOTE — PROGRESS NOTES
[facility]  Office: [address], DO, Miriamyanick Albert, DO, Hemanth Sherrill, DO, Milena Mcmanus Reanna, DO, Ruth Dudley MD, Boo Barron MD, Rainer Monzon MD, Santino Wells MD,  Taar Leggett MD, Erika Flores MD, Rajani Perla, DO, Nettie Ivey MD,  Brenden Berg MD, Chica De Anda MD, Shanda Greene DO, Dom Reed MD, Tiffanie Paz MD, Tanya Woods MD, Rikki Dave DO, Juan Manuel Andrew MD, Rosy Schirmer, MD, Abbi Yao, CNP,  Ирина Johnson CNP, Elizabeth Perez CNP, Desi Melo CNP, Dami Varela PA-C, [facility], Medina Hallman, CNP, Patti Houser, CNP, Nicole Hoover, CNP, Mason Vargas, CNP, Marlene Ramos, CNP, Alberto Moreno, CNS, Анна [facility], Syed Monk, CNP, Venecia Haddad, CNP, Nish [facility], [address] [place] [address]    Progress Note    8/14/2022    8:30 AM    Name:   Cade Tyler  MRN:     [id number]     Acct:      [de-identified]   Room:   [address] [place] [address] Day:  1  Admit Date:  8/13/2022  6:22 PM    PCP:   No primary care provider on file. Code Status:  Full Code    Subjective:     C/C: abd pain  Interval History Status: improved. Brief History:     Cade Tyler is a 25 y.o. Non- / non  male who presents with No chief complaint on file. and is admitted to the hospital for the management of <principal problem not specified>.      25 M w hx of methamphetamine abuse presented to [facility] ED for intractable nausea vomiting abdominal pain found to have a lipase of greater than 3000  He had no evidence of ORA  He was hypokalemic    CT abd pelvis = pancratitis uncomplicated no necrosis no pseudocyst    Lipase trending  Some uncontrolled pain overnight  Toradol ordered  Would like to avoid narcotics    Review of Systems:     Constitutional:  negative for chills, fevers, sweats  Respiratory:  negative for cough, dyspnea on exertion, shortness of breath, wheezing  Cardiovascular:  negative for chest pain, chest pressure/discomfort, lower extremity edema, palpitations  Gastrointestinal:  negative for abdominal pain, constipation, diarrhea, nausea, vomiting  Neurological:  negative for dizziness, headache    Medications: Allergies: Allergies   Allergen Reactions    Augmentin [Amoxicillin-Pot Clavulanate]        Current Meds:   Scheduled Meds:    sodium chloride flush  5-40 mL IntraVENous 2 times per day    enoxaparin  40 mg SubCUTAneous Daily    nicotine  1 patch TransDERmal Daily     Continuous Infusions:    sodium chloride      lactated ringers 250 mL/hr at 22 0802     PRN Meds: sodium chloride flush, sodium chloride, ondansetron **OR** ondansetron, acetaminophen **OR** acetaminophen, polyethylene glycol, ketorolac, magnesium sulfate, potassium chloride **OR** potassium alternative oral replacement **OR** potassium chloride    Data:     Past Medical History:   has a past medical history of Drug abuse ([facility] [address] Social History:   reports that he has been smoking cigarettes. He started smoking about 5 years ago. He has a 4.00 pack-year smoking history. He has never used smokeless tobacco. He reports that he does not currently use alcohol. He reports that he does not currently use drugs after having used the following drugs: Marijuana (Weed) and Methamphetamines (Crystal Meth). Family History: No family history on file. Vitals:  /65   Pulse 88   Temp 98.6 °F (37 °C) (Oral)   Resp 16   Ht 6' 2\" (1.88 m)   Wt 203 lb 14.8 oz (92.5 kg)   SpO2 97%   BMI 26.18 kg/m²   Temp (24hrs), Av.4 °F (36.9 °C), Min:98.2 °F (36.8 °C), Max:98.6 °F (37 °C)    No results for input(s): POCGLU in the last 72 hours. I/O (24Hr):     Intake/Output Summary (Last 24 hours) at 2022 0830  Last data filed at 2022 0600  Gross per 24 hour   Intake 2213.02 ml   Output --   Net 2213.02 ml       Labs:  Hematology:  Recent Labs     08/13/22  0915   WBC 19.4*   RBC 5.73   HGB 17.0   HCT 50.2   MCV 87.6   MCH 29.6   MCHC 33.9   RDW 13.1      INR 1.0     Chemistry:  Recent Labs     08/13/22  0915 08/13/22  2242    137   K 2.9* 3.8   CL 95* 103   CO2 22 20   GLUCOSE 176* 83   BUN 11 6   CREATININE 0.91 0.52*   MG  --  2.0   ANIONGAP 21* 14   LABGLOM Pediatric GFR requires additional information. Refer to [facility] website for calculator. Pediatric GFR requires additional information. Refer to [facility] website for calculator. CALCIUM 9.9 8.5*   TROPHS <6  --      Recent Labs     08/13/22  0915   PROT 7.2   LABALBU 4.9   *   *   ALKPHOS 148*   BILITOT 3.47*   LIPASE >3,000*     ABG:No results found for: POCPH, PHART, PH, POCPCO2, ZKH9MFL, PCO2, POCPO2, PO2ART, PO2, POCHCO3, RYX9PCV, HCO3, NBEA, PBEA, BEART, BE, THGBART, THB, JOW7IBL, VOUV9HYE, Y7KTIHIR, O2SAT, FIO2  No results found for: SPECIAL  No results found for: CULTURE    Radiology:  CT ABDOMEN PELVIS W IV CONTRAST Additional Contrast? None    Result Date: 8/13/2022  Enlarged edematous pancreas with adjacent free fluid concerning for acute pancreatitis. Correlate with serum lipase. No evidence of bowel obstruction or thickening. Normal appendix.        Physical Examination:        General appearance:  alert, cooperative and no distress  Mental Status:  oriented to person, place and time and normal affect  Lungs:  clear to auscultation bilaterally, normal effort  Heart:  regular rate and rhythm, no murmur  Abdomen:  soft, nontender, nondistended, normal bowel sounds, no masses, hepatomegaly, splenomegaly  Extremities:  no edema, redness, tenderness in the calves  Skin:  no gross lesions, rashes, induration    Assessment:        Hospital Problems             Last Modified POA    Acute pancreatitis, unspecified complication status, unspecified pancreatitis type 8/13/2022 Yes    Alcohol induced acute pancreatitis without necrosis or infection 8/13/2022 Yes Gallstone pancreatitis 8/13/2022 Yes       Plan:        Severe pancreatitis  Elevated lipase  Start aggressive fluids LR  GI consult  Given hx of gallstones, may be gallstone pancreatitis  Does have amphetamine and methamphetamine in UDS - no evidence of etoh  Zofran emesis  Avoid narcotics given drug abuse hx - toradol may be beneficial  Npo for now - once ready to eat will slowly advance diet  Daily amylase lipase  Cholestatic liver injury pattern  May need surgery consult or at least op follow up for lap juan      Reva Benavidez MD  8/14/2022  8:30 AM

## 2022-08-14 NOTE — CARE COORDINATION
08/14/22 1032   Service Assessment   Patient Orientation Alert and Oriented   Cognition Alert   History Provided By Patient   Primary Caregiver Self   Support Systems Family Members   PCP Verified by CM Yes  (list given)   Last Visit to PCP Within last 3 months   Prior Functional Level Independent in ADLs/IADLs   Current Functional Level Independent in ADLs/IADLs   Can patient return to prior living arrangement Yes   Ability to make needs known: Good   Family able to assist with home care needs: Yes   Would you like for me to discuss the discharge plan with any other family members/significant others, and if so, who? No   Social/Functional History   Lives With Parent   Type of [place] [address] One level   Home Access Stairs to enter with rails   Entrance Stairs - Number of Steps 3   Entrance Stairs - Rails Right   Bathroom Shower/Tub Tub/Shower unit   Bathroom Toilet Standard   ADL Assistance Independent   Ambulation Assistance Independent   Transfer Assistance Independent   Active  Yes   Mode of Transportation Car   Discharge Planning   Current Services Prior To Admission None   Potential Assistance Needed N/A   DME Ordered?  No   Potential Assistance Purchasing Medications No   Type of Home Care Services None   Patient expects to be discharged to: Trailer/mobile home   One/Two Story Residence One story   History of falls? 0

## 2022-08-14 NOTE — PROGRESS NOTES
Pt tachycardic most of the night but more so with exertion, even as little movement as rolling over in bed. HR around 100 at rest, around 120 with small movements, and HR up to 140 while pt tried using urinal. Provider notified. Pt also complaining of abdominal pain 7 or 8 out of 10 and little to no improvement with the prn Toradol ordered. Provider also notified about pt's uncontrolled pain.

## 2022-08-14 NOTE — CONSULTS
[facility] ([facility]) Gastroenterology  Consultation Note     . Chief Complaint:  Abdominal pain  Vomiting  Reason for consult:    Pancreatitis    History of present illness: This is a 25 y.o. male with no significant PMH who originally presented to the ER with complaints of severe, sharp, diffuse RUQ/abdominal pain, nausea and vomiting that started approximately 2 days ago. Patient admits he is got history of methamphetamine use, occasionally uses marijuana, is only a social drinker and does not use other types of drugs. Patient states he has never had abdominal pain like this before. No recent fevers or chills, diarrhea, constipation, rectal bleeding or hematemesis  Patient denies NSAID use  Patient shared he does have a history of gallstones with plan for outpatient cholecystectomy but that has not happened at this time  Patient is originally from [place] but he recently moved up here to [place] 8 months ago    Summary of imaging completed at this time:  8/13/2022 CT abdomen and pelvis with IV contrast showed enlarged edematous pancreas with adjacent free fluid concerning for acute pancreatitis-no ductal dilatation or choledochal lithiasis noted in report    Summary of current abnormal labs completed at this time:    Metabolic: Potassium 3.4  Hematology: WBCs 14.8  Coags: INR 1.0  Liver profile: , AST 76, amylase 484, lipase 527  Cardiac profile: Triglycerides 81      On physician exam: Patient is in visible pain, exhibits involuntary guarding, is withdrawn      Previous GI history:   No previous EGD or colonoscopy  No personal or family history of GI malignancy    Past Medical/Social/Family History:  Past Medical History:   Diagnosis Date    Drug abuse ([address]      Past Surgical History:   Procedure Laterality Date    TYMPANOSTOMY TUBE PLACEMENT       No family history on file. Previous records/history/ and notes were reviewed    Allergies:   Allergies   Allergen Reactions    Augmentin [Amoxicillin-Pot Clavulanate]        Home medications:  Prior to Admission medications    Not on File     . Current Medications:  Scheduled Meds:   sodium chloride flush  5-40 mL IntraVENous 2 times per day    enoxaparin  40 mg SubCUTAneous Daily    nicotine  1 patch TransDERmal Daily     . Continuous Infusions:   sodium chloride      lactated ringers 250 mL/hr at 08/14/22 1345     . PRN Meds:HYDROmorphone, oxyCODONE, sodium chloride flush, sodium chloride, ondansetron **OR** ondansetron, acetaminophen **OR** acetaminophen, polyethylene glycol, ketorolac, magnesium sulfate, potassium chloride **OR** potassium alternative oral replacement **OR** potassium chloride    REVIEW OF SYSTEMS:     Constitutional: No fever, no chills, no lethargy, no weakness, no weight loss  HEENT:  No headache, otalgia, itchy eyes, nasal discharge or sore throat. Cardiac:  No chest pain, dyspnea, orthopnea or PND. Chest:   No cough, phlegm or wheezing. Abdomen:  Severe ruq abdominal pain  Neuro:  No focal weakness, abnormal movements or seizure like activity. Skin:   No rashes, no itching. :   No hematuria, no pyuria, no dysuria, no flank pain. Extremities:  No swelling or joint pains. ROS was otherwise negative except as mentioned in the [address] PHYSICAL EXAM:    BP (!) 143/71   Pulse 98   Temp 98.4 °F (36.9 °C) (Oral)   Resp 14   Ht 6' 2\" (1.88 m)   Wt 203 lb 14.8 oz (92.5 kg)   SpO2 98%   BMI 26.18 kg/m²   . TMAX[24]    General: Well developed, Well nourished, No apparent distress  Head:  Normocephalic, Atraumatic  EENT: EOMI, Sclera not icteric, Oropharynx moist  Neck:  Supple, Trachea midline  Lungs:CTA Bilaterally  Heart: RRR, No murmur, No rub, No gallop, PMI nondisplaced. Abdomen:Soft, Non tender, Not distended, BS WNL,  No masses. No hepatomegalia   Ext:No clubbing. No cyanosis. No edema. Skin: No rashes. No jaundice. No stigmata of liver disease.     Neuro:  A&O x Three, No focal neurological deficits    Imaging: 8/13/2022 CT abdomen and pelvis with IV contrast  FINDINGS:        The visualized lung bases are clear. The liver is normal in appearance. There is no biliary ductal dilatation. The    gallbladder is not distended. There is an enlarged edematous appearing pancreas    with adjacent free fluid consistent with acute pancreatitis. No pseudocyst is    seen. The pancreas appears to enhance homogeneously. The splenic vein is    patent. The spleen is normal.       The bilateral adrenal glands are within normal limits. The bilateral kidneys    are normal in appearance without hydronephrosis. No bowel wall thickening or evidence of bowel obstruction is identified. The    appendix is normal. There is no abdominal lymphadenopathy. There is no focal bladder abnormality. No pelvic lymphadenopathy is identified. A small amount of free fluid is seen in the pelvic cul-de-sac. No concerning sclerotic or lytic osseous lesions are identified. Impression       Enlarged edematous pancreas with adjacent free fluid concerning for acute    pancreatitis. Correlate with serum lipase. No evidence of bowel obstruction or thickening. Normal appendix. Hemotological labs: Anemia studies:  No results for input(s): LABIRON, TIBC, FERRITIN, [id number], FOLATE, OCCULTBLD in the last 72 hours.     CBC:  Recent Labs     08/13/22  0915 08/14/22  0842   WBC 19.4* 14.8*   HGB 17.0 14.8   MCV 87.6 92.0   RDW 13.1 12.5    176       PT/INR:  Recent Labs     08/13/22  0915   PROTIME 13.4   INR 1.0       BMP:  Recent Labs     08/13/22  0915 08/13/22  2242 08/14/22  0842    137 136   K 2.9* 3.8 3.4*   CL 95* 103 101   CO2 22 20 22   BUN 11 6 6   CREATININE 0.91 0.52* 0.49*   GLUCOSE 176* 83 99   CALCIUM 9.9 8.5* 8.3*       Liver work up:  Hepatitis Functional Panel:  Recent Labs     08/14/22  0842   ALKPHOS 104   *   AST 76*   PROT 6.0*   BILITOT 0.91   LABALBU 3.9 Amylase/Lipase/Ammonia:  Recent Labs     08/13/22  0915 08/14/22  0842   AMYLASE  --  484*   LIPASE >3,000* 527*       Acute Hepatitis Panel:  No results found for: HEPBSAG, HEPCAB, HEPBIGM, HEPAIGM         Active Problems:    Acute pancreatitis, unspecified complication status, unspecified pancreatitis type    Alcohol induced acute pancreatitis without necrosis or infection    Gallstone pancreatitis  Resolved Problems:    * No resolved hospital problems. *       GI Impression:  62-year-old male with mild acute pancreatitis most likely due to the recent drug use. No choledochal lithiasis or ductal dilatation noted on CT scan. Triglycerides WNL. Only occasional social drinker  -Patient does have history of choledochal lithiasis    Recommendations and plan: Will order right upper quadrant ultrasound to rule out choledochal lithiasis given patient's history  N.p.o. meds with sip is fine  Supportive care with IV fluids-ordered additional bolus to be followed by  mL/h, antiemetics, pain medication  Strict I's and O's  Recommend EPC cuffs/Lovenox and incentive spirometer  Daily labs   Will order IgG subclass, DILLAN to assess for autoimmune pancreatitis given young age  Patient counseled at length regarding alcohol abstinence, drug abstinence and possible rehab  Stat chest xray to assess for pleural effusion  Will follow closely    This plan was formulated in collaboration with Dr. Jaimie Mari MD  Please feel free to contact us with any questions or concerns      [facility] [place] Gastroenterology  [facility], [address] [place] [address]   [phone]  8/14/2022    3:32 PM     This note was created with the assistance of a speech-recognition program.  Although the intention is to generate a document that actually reflects the content of the visit, no guarantees can be provided that every mistake has been identified and corrected by editing.

## 2022-08-15 PROBLEM — R10.10 PAIN OF UPPER ABDOMEN: Status: ACTIVE | Noted: 2022-08-15

## 2022-08-15 PROBLEM — R79.89 ELEVATED LFTS: Status: ACTIVE | Noted: 2022-08-15

## 2022-08-15 LAB
ALBUMIN SERPL-MCNC: 3.2 G/DL (ref 3.5–5.2)
ALBUMIN/GLOBULIN RATIO: 1.5 (ref 1–2.5)
ALP BLD-CCNC: 80 U/L (ref 40–129)
ALT SERPL-CCNC: 160 U/L (ref 5–41)
AMYLASE: 203 U/L (ref 28–100)
ANION GAP SERPL CALCULATED.3IONS-SCNC: 11 MMOL/L (ref 9–17)
ANTI DNA DOUBLE STRANDED: 0.8 IU/ML
ANTI-NUCLEAR ANTIBODY (ANA): NEGATIVE
AST SERPL-CCNC: 28 U/L
BILIRUB SERPL-MCNC: 1.05 MG/DL (ref 0.3–1.2)
BUN BLDV-MCNC: 5 MG/DL (ref 6–20)
CALCIUM SERPL-MCNC: 8.4 MG/DL (ref 8.6–10.4)
CHLORIDE BLD-SCNC: 102 MMOL/L (ref 98–107)
CO2: 26 MMOL/L (ref 20–31)
CREAT SERPL-MCNC: 0.57 MG/DL (ref 0.7–1.2)
ENA ANTIBODIES SCREEN: 0.2 U/ML
GFR NON-AFRICAN AMERICAN: ABNORMAL ML/MIN
GFR SERPL CREATININE-BSD FRML MDRD: ABNORMAL ML/MIN/{1.73_M2}
GLUCOSE BLD-MCNC: 93 MG/DL (ref 70–99)
HCT VFR BLD CALC: 38.6 % (ref 40.7–50.3)
HEMOGLOBIN: 13.6 G/DL (ref 13–17)
LIPASE: 103 U/L (ref 13–60)
MAGNESIUM: 1.7 MG/DL (ref 1.7–2.2)
MCH RBC QN AUTO: 31.1 PG (ref 25.2–33.5)
MCHC RBC AUTO-ENTMCNC: 35.2 G/DL (ref 28.4–34.8)
MCV RBC AUTO: 88.1 FL (ref 82.6–102.9)
NRBC AUTOMATED: 0 PER 100 WBC
PDW BLD-RTO: 12.1 % (ref 11.8–14.4)
PLATELET # BLD: 137 K/UL (ref 138–453)
PMV BLD AUTO: 10.2 FL (ref 8.1–13.5)
POTASSIUM SERPL-SCNC: 3.8 MMOL/L (ref 3.7–5.3)
RBC # BLD: 4.38 M/UL (ref 4.21–5.77)
SODIUM BLD-SCNC: 139 MMOL/L (ref 135–144)
TOTAL PROTEIN: 5.3 G/DL (ref 6.4–8.3)
WBC # BLD: 13.2 K/UL (ref 4.5–13.5)

## 2022-08-15 PROCEDURE — 6370000000 HC RX 637 (ALT 250 FOR IP): Performed by: STUDENT IN AN ORGANIZED HEALTH CARE EDUCATION/TRAINING PROGRAM

## 2022-08-15 PROCEDURE — 83690 ASSAY OF LIPASE: CPT

## 2022-08-15 PROCEDURE — 85027 COMPLETE CBC AUTOMATED: CPT

## 2022-08-15 PROCEDURE — 83735 ASSAY OF MAGNESIUM: CPT

## 2022-08-15 PROCEDURE — 2580000003 HC RX 258: Performed by: INTERNAL MEDICINE

## 2022-08-15 PROCEDURE — 6360000002 HC RX W HCPCS: Performed by: NURSE PRACTITIONER

## 2022-08-15 PROCEDURE — 6370000000 HC RX 637 (ALT 250 FOR IP): Performed by: INTERNAL MEDICINE

## 2022-08-15 PROCEDURE — 82150 ASSAY OF AMYLASE: CPT

## 2022-08-15 PROCEDURE — 2060000000 HC ICU INTERMEDIATE R&B

## 2022-08-15 PROCEDURE — C9113 INJ PANTOPRAZOLE SODIUM, VIA: HCPCS | Performed by: NURSE PRACTITIONER

## 2022-08-15 PROCEDURE — 6360000002 HC RX W HCPCS: Performed by: STUDENT IN AN ORGANIZED HEALTH CARE EDUCATION/TRAINING PROGRAM

## 2022-08-15 PROCEDURE — 2580000003 HC RX 258: Performed by: STUDENT IN AN ORGANIZED HEALTH CARE EDUCATION/TRAINING PROGRAM

## 2022-08-15 PROCEDURE — 36415 COLL VENOUS BLD VENIPUNCTURE: CPT

## 2022-08-15 PROCEDURE — 99232 SBSQ HOSP IP/OBS MODERATE 35: CPT | Performed by: STUDENT IN AN ORGANIZED HEALTH CARE EDUCATION/TRAINING PROGRAM

## 2022-08-15 PROCEDURE — A4216 STERILE WATER/SALINE, 10 ML: HCPCS | Performed by: NURSE PRACTITIONER

## 2022-08-15 PROCEDURE — 2580000003 HC RX 258: Performed by: NURSE PRACTITIONER

## 2022-08-15 PROCEDURE — 99232 SBSQ HOSP IP/OBS MODERATE 35: CPT | Performed by: INTERNAL MEDICINE

## 2022-08-15 PROCEDURE — 80053 COMPREHEN METABOLIC PANEL: CPT

## 2022-08-15 RX ORDER — HYDROCODONE BITARTRATE AND ACETAMINOPHEN 5; 325 MG/1; MG/1
1 TABLET ORAL EVERY 4 HOURS PRN
Qty: 8 TABLET | Refills: 0 | Status: SHIPPED | OUTPATIENT
Start: 2022-08-15 | End: 2022-08-18

## 2022-08-15 RX ADMIN — ACETAMINOPHEN 650 MG: 325 TABLET ORAL at 11:03

## 2022-08-15 RX ADMIN — OXYCODONE 5 MG: 5 TABLET ORAL at 01:56

## 2022-08-15 RX ADMIN — POLYETHYLENE GLYCOL 3350 17 G: 17 POWDER, FOR SOLUTION ORAL at 11:03

## 2022-08-15 RX ADMIN — HYDROMORPHONE HYDROCHLORIDE 0.25 MG: 1 INJECTION, SOLUTION INTRAMUSCULAR; INTRAVENOUS; SUBCUTANEOUS at 17:04

## 2022-08-15 RX ADMIN — OXYCODONE 5 MG: 5 TABLET ORAL at 20:46

## 2022-08-15 RX ADMIN — OXYCODONE 5 MG: 5 TABLET ORAL at 06:19

## 2022-08-15 RX ADMIN — SODIUM CHLORIDE, PRESERVATIVE FREE 40 MG: 5 INJECTION INTRAVENOUS at 13:05

## 2022-08-15 RX ADMIN — SODIUM CHLORIDE, POTASSIUM CHLORIDE, SODIUM LACTATE AND CALCIUM CHLORIDE: 600; 310; 30; 20 INJECTION, SOLUTION INTRAVENOUS at 16:19

## 2022-08-15 RX ADMIN — KETOROLAC TROMETHAMINE 30 MG: 30 INJECTION, SOLUTION INTRAMUSCULAR; INTRAVENOUS at 08:18

## 2022-08-15 RX ADMIN — ACETAMINOPHEN 650 MG: 325 TABLET ORAL at 01:57

## 2022-08-15 RX ADMIN — SODIUM CHLORIDE, PRESERVATIVE FREE 10 ML: 5 INJECTION INTRAVENOUS at 20:46

## 2022-08-15 RX ADMIN — KETOROLAC TROMETHAMINE 30 MG: 30 INJECTION, SOLUTION INTRAMUSCULAR; INTRAVENOUS at 16:18

## 2022-08-15 ASSESSMENT — PAIN SCALES - GENERAL
PAINLEVEL_OUTOF10: 3
PAINLEVEL_OUTOF10: 6
PAINLEVEL_OUTOF10: 6
PAINLEVEL_OUTOF10: 3
PAINLEVEL_OUTOF10: 2
PAINLEVEL_OUTOF10: 6
PAINLEVEL_OUTOF10: 6
PAINLEVEL_OUTOF10: 5
PAINLEVEL_OUTOF10: 6
PAINLEVEL_OUTOF10: 8

## 2022-08-15 ASSESSMENT — PAIN DESCRIPTION - DESCRIPTORS
DESCRIPTORS: ACHING;SQUEEZING
DESCRIPTORS: ACHING;SQUEEZING
DESCRIPTORS: ACHING
DESCRIPTORS: ACHING

## 2022-08-15 ASSESSMENT — PAIN DESCRIPTION - ORIENTATION
ORIENTATION: RIGHT
ORIENTATION: MID
ORIENTATION: MID
ORIENTATION: LEFT;RIGHT;MID

## 2022-08-15 ASSESSMENT — PAIN DESCRIPTION - LOCATION
LOCATION: ABDOMEN

## 2022-08-15 NOTE — PLAN OF CARE
Problem: Discharge Planning  Goal: Discharge to home or other facility with appropriate resources  Outcome: Progressing     Problem: Pain  Goal: Verbalizes/displays adequate comfort level or baseline comfort level  Outcome: Progressing     Problem: Nutrition Deficit:  Goal: Optimize nutritional status  8/15/2022 1841 by Petar Alvarez RN  Outcome: Progressing  8/15/2022 1137 by Eagle Melo RD  Flowsheets (Taken 8/15/2022 1137)  Nutrient intake appropriate for improving, restoring, or maintaining nutritional needs:   Assess nutritional status and recommend course of action   Monitor oral intake, labs, and treatment plans   Recommend appropriate diets, oral nutritional supplements, and vitamin/mineral supplements

## 2022-08-15 NOTE — PROGRESS NOTES
Patients girlfriend Allen Bauer ([phone]) staying at [facility] from Home room #1.     Electronically signed by Maris Tyson RN on 8/15/2022 at 6:44 PM

## 2022-08-15 NOTE — PROGRESS NOTES
[facility]  Office: [address], DO, Jerod Worrell, DO, Gomez Base, DO, Bud Albert Blood, DO, Lucy Banks MD, Mercedes Dowd MD, Vicente Nash MD, Amaya Adamson MD,  Brady Alcaraz MD, Divine Sutton MD, Traci Ramos, DO, Emerita Luna MD,  Cody Carty MD, Shraddha Stewart MD, Richard Horn, DO, Iron Castillo MD, Maday Alfaro MD, Terrell Herman MD, Narda Carey, DO, Collette Teran MD, Elvira Beckham MD, Rosario Aragon, CNP,  Leonor De La Paz, CNP, Darwin Ryan, CNP, Quinton Cevallos, CNP, Danni Guaman PA-C, Denver Parks, DNP, Mignon Saenz, CNP, Mariano Pacheco, CNP, Belle Jacobo, CNP, Ishmael Hand, CNP, Dary Chavira, CNP, Edvin Garcia, CNS, Anastacio Peraza, [facility], Kojo Allen, CNP, Jeb Erickson, CNP, Naif James, CNP           [address] [place] [address]    Progress Note    8/15/2022    9:22 AM    Name:   Jaun Millan  MRN:     [id number]     Acct:      [de-identified]   Room:   [facility] Day:  2  Admit Date:  8/13/2022  6:22 PM    PCP:   No primary care provider on file. Code Status:  Full Code    Subjective:     C/C: abd pain  Interval History Status: improved. Brief History:     Jaun Millan is a 25 y.o. Non- / non  male who presents with No chief complaint on file. and is admitted to the hospital for the management of <principal problem not specified>.      25 M w hx of methamphetamine abuse presented to [facility] ED for intractable nausea vomiting abdominal pain found to have a lipase of greater than 3000  He had no evidence of ORA  He was hypokalemic    CT abd pelvis = pancratitis uncomplicated no necrosis no pseudocyst    Lipase trending  Some uncontrolled pain overnight  Toradol ordered  Would like to avoid narcotics    Review of Systems:     Constitutional:  negative for chills, fevers, sweats  Respiratory:  negative for cough, dyspnea on exertion, shortness of breath, wheezing  Cardiovascular:  negative for chest pain, chest pressure/discomfort, lower extremity edema, palpitations  Gastrointestinal:  negative for abdominal pain, constipation, diarrhea, nausea, vomiting  Neurological:  negative for dizziness, headache    Medications: Allergies: Allergies   Allergen Reactions    Augmentin [Amoxicillin-Pot Clavulanate]        Current Meds:   Scheduled Meds:    sodium chloride flush  5-40 mL IntraVENous 2 times per day    enoxaparin  40 mg SubCUTAneous Daily    nicotine  1 patch TransDERmal Daily     Continuous Infusions:    sodium chloride      lactated ringers 250 mL/hr at 22 2246     PRN Meds: HYDROmorphone, oxyCODONE, sodium chloride flush, sodium chloride, ondansetron **OR** ondansetron, acetaminophen **OR** acetaminophen, polyethylene glycol, ketorolac, magnesium sulfate, potassium chloride **OR** potassium alternative oral replacement **OR** potassium chloride    Data:     Past Medical History:   has a past medical history of Drug abuse ([facility] [address] Social History:   reports that he has been smoking cigarettes. He started smoking about 5 years ago. He has a 4.00 pack-year smoking history. He has never used smokeless tobacco. He reports that he does not currently use alcohol. He reports that he does not currently use drugs after having used the following drugs: Marijuana (Weed) and Methamphetamines (Crystal Meth). Family History: No family history on file. Vitals:  /62   Pulse 78   Temp 98.1 °F (36.7 °C) (Temporal)   Resp 17   Ht 6' 2\" (1.88 m)   Wt 203 lb 14.8 oz (92.5 kg)   SpO2 96%   BMI 26.18 kg/m²   Temp (24hrs), Av.8 °F (37.1 °C), Min:98.1 °F (36.7 °C), Max:100.1 °F (37.8 °C)    No results for input(s): POCGLU in the last 72 hours. I/O (24Hr):     Intake/Output Summary (Last 24 hours) at 8/15/2022 0922  Last data filed at 8/15/2022 0830  Gross per 24 hour   Intake --   Output 3050 ml   Net -3050 ml Labs:  Hematology:  Recent Labs     08/13/22  0915 08/14/22  0842 08/15/22  0601   WBC 19.4* 14.8* 13.2   RBC 5.73 4.90 4.38   HGB 17.0 14.8 13.6   HCT 50.2 45.1 38.6*   MCV 87.6 92.0 88.1   MCH 29.6 30.2 31.1   MCHC 33.9 32.8 35.2*   RDW 13.1 12.5 12.1    176 137*   MPV  --  10.5 10.2   INR 1.0  --   --      Chemistry:  Recent Labs     08/13/22  0915 08/13/22  2242 08/14/22  0842 08/15/22  0601    137 136 139   K 2.9* 3.8 3.4* 3.8   CL 95* 103 101 102   CO2 22 20 22 26   GLUCOSE 176* 83 99 93   BUN 11 6 6 5*   CREATININE 0.91 0.52* 0.49* 0.57*   MG  --  2.0 1.7 1.7   ANIONGAP 21* 14 13 11   LABGLOM Pediatric GFR requires additional information. Refer to [facility] website for calculator. Pediatric GFR requires additional information. Refer to [facility] website for calculator. Pediatric GFR requires additional information. Refer to [facility] website for calculator. Pediatric GFR requires additional information. Refer to [facility] website for calculator. CALCIUM 9.9 8.5* 8.3* 8.4*   TROPHS <6  --   --   --      Recent Labs     08/13/22  0915 08/14/22  0842 08/14/22  1438 08/15/22  0601   PROT 7.2 6.0*  --  5.3*   LABALBU 4.9 3.9  --  3.2*   * 76*  --  28   * 291*  --  160*   ALKPHOS 148* 104  --  80   BILITOT 3.47* 0.91  --  1.05   AMYLASE  --  484*  --  203*   LIPASE >3,000* 527*  --  103*   TRIG  --   --  81  --      ABG:No results found for: POCPH, PHART, PH, POCPCO2, YVQ9CBO, PCO2, POCPO2, PO2ART, PO2, POCHCO3, QRO4WKG, HCO3, NBEA, PBEA, BEART, BE, THGBART, THB, BTD1ICM, AYER2XKD, Y2ZPIFYU, O2SAT, FIO2  No results found for: SPECIAL  No results found for: CULTURE    Radiology:  CT ABDOMEN PELVIS W IV CONTRAST Additional Contrast? None    Result Date: 8/13/2022  Enlarged edematous pancreas with adjacent free fluid concerning for acute pancreatitis. Correlate with serum lipase. No evidence of bowel obstruction or thickening. Normal appendix.        Physical Examination:        General appearance:  alert, cooperative and no distress  Mental Status:  oriented to person, place and time and normal affect  Lungs:  clear to auscultation bilaterally, normal effort  Heart:  regular rate and rhythm, no murmur  Abdomen:  soft, nontender, nondistended, normal bowel sounds, no masses, hepatomegaly, splenomegaly  Extremities:  no edema, redness, tenderness in the calves  Skin:  no gross lesions, rashes, induration    Assessment:        Hospital Problems             Last Modified POA    Acute pancreatitis, unspecified complication status, unspecified pancreatitis type 8/13/2022 Yes    Alcohol induced acute pancreatitis without necrosis or infection 8/13/2022 Yes    Gallstone pancreatitis 8/13/2022 Yes       Plan:        Severe pancreatitis  Elevated lipase which has no normalized  LR running  Given hx of gallstones, may be gallstone pancreatitis  Does have amphetamine and methamphetamine in UDS - no evidence of etoh  Zofran emesis  Avoid narcotics given drug abuse hx - toradol may be beneficial  Npo for now - once ready to eat will slowly advance diet  Daily amylase lipase  Cholestatic liver injury pattern  May need surgery consult or at least op follow up for lap juan    Can dc today if can tolerate clear liquids  8 pills jj Rey outpatient f/u    Would benefit from additional night per GI colleagues  If pt adamant on leaving please PS me and I will come speak with him      Yen Escobar MD  8/15/2022  9:22 AM

## 2022-08-15 NOTE — PROGRESS NOTES
Patient had been tolerating clear liquid diet well since early morning. Around [phone] patient called out and requested pain meds due to increasing abdominal pain. Patient relates that his pain level increases significantly with movement. Patient medicated with PRN Toraldol. Around an hour later patient still complains of pain and asks for more pain meds. Patient states that the pain seemed to increase when he took a drink of [employer]. Patient medicated per PRN orders with 0.25mg IV Dilaudid. Patient did not want any PO meds at that time. Patient also states that he has not had a bowel movement since prior to admission. Patient was medicated with PRN Glycolax with his morning meds.       Electronically signed by Roldan Hogue RN on 8/15/2022 at 6:41 PM

## 2022-08-15 NOTE — CARE COORDINATION
Discharge [facility] Case Management Department  Written by: Mesha Urban RN    Patient Name: Gato Alberto  Attending Provider: Yen Escobar MD  Admit Date: 2022  6:22 PM  MRN: [id number]  Account: [de-identified]                     : 2000  Discharge Date: 8/15/22      Disposition: home, no needs    Mesha Urban RN

## 2022-08-15 NOTE — PROGRESS NOTES
[facility]. [facility]   Gastroenterology Progress Note    Esperanza De Paz is a 25 y.o. male patient. Hospitalization Day:2      Chief consult reason:     Pancreatitis    Subjective:  Pt seen and examined. Pt states his pain is better, 4/10. No nausea or vomiting. Patient states he woke up with sweats in the middle of the night. Tmax was 100.1  Lipase improving  Chest xray was negative for pleural effusions or other abnormalities      VITALS:  /62   Pulse 94   Temp 98.1 °F (36.7 °C) (Temporal)   Resp 17   Ht 6' 2\" (1.88 m)   Wt 203 lb 14.8 oz (92.5 kg)   SpO2 96%   BMI 26.18 kg/m²   TEMPERATURE:  Current - Temp: 98.1 °F (36.7 °C); Max - Temp  Av.8 °F (37.1 °C)  Min: 98.1 °F (36.7 °C)  Max: 100.1 °F (37.8 °C)    Physical Assessment:  General appearance:  alert, cooperative and slight pain distress  Mental Status:  oriented to person, place and time and normal affect  Lungs:  clear to auscultation bilaterally, normal effort  Heart:  regular rate and rhythm, no murmur  Abdomen:  soft, slightly tender, nondistended, normal bowel sounds, no masses, hepatomegaly, splenomegaly  Extremities:  no edema, redness, tenderness in the calves  Skin:  no gross lesions, rashes, induration    Data Review:    Labs and Imaging:     CBC:  Recent Labs     22  0915 22  0842 08/15/22  0601   WBC 19.4* 14.8* 13.2   HGB 17.0 14.8 13.6   MCV 87.6 92.0 88.1   RDW 13.1 12.5 12.1    176 137*       ANEMIA STUDIES:  No results for input(s): LABIRON, TIBC, FERRITIN, WONPSLBD28, FOLATE, OCCULTBLD in the last 72 hours.     BMP:  Recent Labs     22  2242 22  0842 08/15/22  0601    136 139   K 3.8 3.4* 3.8    101 102   CO2 20 22 26   BUN 6 6 5*   CREATININE 0.52* 0.49* 0.57*   GLUCOSE 83 99 93   CALCIUM 8.5* 8.3* 8.4*       LFTS:  Recent Labs     22  0915 22  0842 08/15/22  0601   ALKPHOS 148* 104 80   * 291* 160*   * 76* 28   BILITOT 3.47* 0.91 1. 05   LABALBU 4.9 3.9 3.2*       Amylase/Lipase and Ammonia:  Recent Labs     08/13/22  0915 08/14/22  0842 08/15/22  0601   AMYLASE  --  484* 203*   LIPASE >3,000* 527* 103*       Acute Hepatitis Panel:  No results found for: HEPBSAG, HEPCAB, HEPBIGM, HEPAIGM    HCV Genotype:  No results found for: HEPATITISCGENOTYPE    HCV Quantitative:  No results found for: HCVQNT    LIVER WORK UP:    AFP  No results found for: AFP    Alpha 1 antitrypsin   No results found for: A1A    DILLAN  No results found for: DILLAN    AMA  No results found for: MITOAB    ASMA  No results found for: SMOOTHMUSCAB    PT/INR  Recent Labs     08/13/22  0915   PROTIME 13.4   INR 1.0       Cancer Markers:  CEA:  No results for input(s): CEA in the last 72 hours. Ca 125:  No results for input(s):  in the last 72 hours. Ca 19-9:   Invalid input(s):   AFP: No results for input(s): AFP in the last 72 hours. Lactic acid:Invalid input(s): LACTIC ACID    Radiology Review:    No results found. Active Problems:    Acute pancreatitis, unspecified complication status, unspecified pancreatitis type    Alcohol induced acute pancreatitis without necrosis or infection    Gallstone pancreatitis  Resolved Problems:    * No resolved hospital problems. *       GI Impression:    Risk associated alcohol misuse disorder  Polysubstance abuse  Suspected gallstone pancreatitis with fluid collection-suspect pt passed stone already. -BISAP score 1. No pleural effusions/consolidations on chest xray. Tmax overnight 100.1  Cholelithiasis    Plan and Recommendations:    Continue supportive care with IV fluids, antiemetics, pain medications  Clear liquid diet  Daily labs including CBC BMP LFT, INR  Pt will need to be evaluated per GS for possible cholecystitis-IP vs OP  Will follow      This plan was formulated in collaboration with Dr. Brian Stewart MD    Thank you for allowing me to participate in the care of your patient.   Please feel free to contact me with any questions or concerns. [address] Gastroenterology   [facility], [address] [place] [address]   [phone]  8/15/2022  8:35 AM    This note was created with the assistance of a speech-recognition program.  Although the intention is to generate a document that actually reflects the content of the visit, no guarantees can be provided that every mistake has been identified and corrected by editing.

## 2022-08-15 NOTE — PROGRESS NOTES
Comprehensive Nutrition Assessment    Type and Reason for Visit:  Positive Nutrition Screen    Nutrition Recommendations/Plan:   Continue Clear Liquid diet  Monitor for diet advancement/tolerance  Monitor labs, wt, plan of care     Malnutrition Assessment:  Malnutrition Status: At risk for malnutrition (08/15/22 1134)    Context:  Acute Illness     Findings of the 6 clinical characteristics of malnutrition:  Energy Intake:  Mild decrease in energy intake   Weight Loss:  No significant weight loss     Body Fat Loss:  No significant body fat loss     Muscle Mass Loss:  No significant muscle mass loss    Fluid Accumulation:  No significant fluid accumulation     Strength:  Not Performed    Nutrition Assessment:    Pt presents with intractable nausea, vomiting, abdominal pain; admitted for pancreatitis. Hx of drug abuse. Pt reports a decreased appetite, abdominal pain, denies n/v. On clear liquid diet, tried chicken broth this morning.  lbs, reports wt loss over past few months d/t drug use. Wt hx reviewed, 10% wt loss x 1 year (not significant). Nutrition Related Findings:    labs/meds reviewed Wound Type: None       Current Nutrition Intake & Therapies:    Average Meal Intake: Unable to assess (clear liquid diet)  Average Supplements Intake: None Ordered  ADULT DIET; Clear Liquid    Anthropometric Measures:  Height: 6' 2\" (188 cm)  Ideal Body Weight (IBW): 190 lbs (86 kg)       Current Body Weight: 203 lb 14.8 oz (92.5 kg),   IBW. Weight Source: Bed Scale  Current BMI (kg/m2): 26.2                          BMI Categories: Overweight (BMI 25.0-29. 9)    Estimated Daily Nutrient Needs:  Energy Requirements Based On: Formula  Weight Used for Energy Requirements: Admission  Energy (kcal/day): 2200 kcal/day  Weight Used for Protein Requirements: Admission  Protein (g/day):  g/day     Fluid (ml/day): per MD    Nutrition Diagnosis:   Inadequate oral intake related to altered GI function as evidenced by poor intake prior to admission, NPO or clear liquid status due to medical condition    Nutrition Interventions:   Food and/or Nutrient Delivery: Continue Current Diet  Nutrition Education/Counseling: No recommendation at this time  Coordination of Nutrition Care: Continue to monitor while inpatient       Goals:  Previous Goal Met:  (goal set)  Goals: by next RD assessment (tolerate diet advancment)       Nutrition Monitoring and Evaluation:   Behavioral-Environmental Outcomes: None Identified  Food/Nutrient Intake Outcomes: Diet Advancement/Tolerance, IVF Intake  Physical Signs/Symptoms Outcomes: Biochemical Data, Nutrition Focused Physical Findings, Weight, GI Status    Discharge Planning:    No discharge needs at this time     Trixie Zamora, [address]: [phone]

## 2022-08-15 NOTE — PLAN OF CARE
Problem: Discharge Planning  Goal: Discharge to home or other facility with appropriate resources  8/14/2022 2107 by Sierra Ulrich RN  Outcome: Progressing  8/14/2022 1709 by Anahy Luo RN  Outcome: Progressing     Problem: Pain  Goal: Verbalizes/displays adequate comfort level or baseline comfort level  8/14/2022 2107 by Sierra Ulrich RN  Outcome: Progressing  8/14/2022 1709 by Anahy Luo RN  Outcome: Progressing

## 2022-08-16 ENCOUNTER — TELEPHONE (OUTPATIENT)
Dept: BARIATRICS/WEIGHT MGMT | Age: 22
End: 2022-08-16

## 2022-08-16 VITALS
HEIGHT: 74 IN | SYSTOLIC BLOOD PRESSURE: 143 MMHG | TEMPERATURE: 98.6 F | DIASTOLIC BLOOD PRESSURE: 85 MMHG | RESPIRATION RATE: 20 BRPM | OXYGEN SATURATION: 98 % | HEART RATE: 104 BPM | WEIGHT: 203.93 LBS | BODY MASS INDEX: 26.17 KG/M2

## 2022-08-16 LAB
ALBUMIN SERPL-MCNC: 3.1 G/DL (ref 3.5–5.2)
ALBUMIN/GLOBULIN RATIO: 1.2 (ref 1–2.5)
ALP BLD-CCNC: 73 U/L (ref 40–129)
ALT SERPL-CCNC: 102 U/L (ref 5–41)
AMYLASE: 82 U/L (ref 28–100)
ANION GAP SERPL CALCULATED.3IONS-SCNC: 10 MMOL/L (ref 9–17)
AST SERPL-CCNC: 17 U/L
BILIRUB SERPL-MCNC: 0.81 MG/DL (ref 0.3–1.2)
BUN BLDV-MCNC: 4 MG/DL (ref 6–20)
CALCIUM SERPL-MCNC: 8.4 MG/DL (ref 8.6–10.4)
CHLORIDE BLD-SCNC: 101 MMOL/L (ref 98–107)
CO2: 26 MMOL/L (ref 20–31)
CREAT SERPL-MCNC: 0.51 MG/DL (ref 0.7–1.2)
GFR NON-AFRICAN AMERICAN: ABNORMAL ML/MIN
GFR SERPL CREATININE-BSD FRML MDRD: ABNORMAL ML/MIN/{1.73_M2}
GLUCOSE BLD-MCNC: 91 MG/DL (ref 70–99)
HCT VFR BLD CALC: 38.5 % (ref 40.7–50.3)
HEMOGLOBIN: 13.7 G/DL (ref 13–17)
LIPASE: 29 U/L (ref 13–60)
MAGNESIUM: 1.7 MG/DL (ref 1.7–2.2)
MCH RBC QN AUTO: 31 PG (ref 25.2–33.5)
MCHC RBC AUTO-ENTMCNC: 35.6 G/DL (ref 28.4–34.8)
MCV RBC AUTO: 87.1 FL (ref 82.6–102.9)
NRBC AUTOMATED: 0 PER 100 WBC
PDW BLD-RTO: 11.9 % (ref 11.8–14.4)
PLATELET # BLD: 140 K/UL (ref 138–453)
PMV BLD AUTO: 10.5 FL (ref 8.1–13.5)
POTASSIUM SERPL-SCNC: 3.7 MMOL/L (ref 3.7–5.3)
RBC # BLD: 4.42 M/UL (ref 4.21–5.77)
SODIUM BLD-SCNC: 137 MMOL/L (ref 135–144)
TOTAL PROTEIN: 5.6 G/DL (ref 6.4–8.3)
WBC # BLD: 12.6 K/UL (ref 4.5–13.5)

## 2022-08-16 PROCEDURE — 83690 ASSAY OF LIPASE: CPT

## 2022-08-16 PROCEDURE — 83735 ASSAY OF MAGNESIUM: CPT

## 2022-08-16 PROCEDURE — 80053 COMPREHEN METABOLIC PANEL: CPT

## 2022-08-16 PROCEDURE — 6360000002 HC RX W HCPCS: Performed by: STUDENT IN AN ORGANIZED HEALTH CARE EDUCATION/TRAINING PROGRAM

## 2022-08-16 PROCEDURE — 36415 COLL VENOUS BLD VENIPUNCTURE: CPT

## 2022-08-16 PROCEDURE — 82150 ASSAY OF AMYLASE: CPT

## 2022-08-16 PROCEDURE — 85027 COMPLETE CBC AUTOMATED: CPT

## 2022-08-16 PROCEDURE — 99238 HOSP IP/OBS DSCHRG MGMT 30/<: CPT | Performed by: FAMILY MEDICINE

## 2022-08-16 PROCEDURE — 6360000002 HC RX W HCPCS: Performed by: INTERNAL MEDICINE

## 2022-08-16 PROCEDURE — 99232 SBSQ HOSP IP/OBS MODERATE 35: CPT | Performed by: INTERNAL MEDICINE

## 2022-08-16 PROCEDURE — 2580000003 HC RX 258: Performed by: STUDENT IN AN ORGANIZED HEALTH CARE EDUCATION/TRAINING PROGRAM

## 2022-08-16 PROCEDURE — 6360000002 HC RX W HCPCS: Performed by: NURSE PRACTITIONER

## 2022-08-16 PROCEDURE — 6370000000 HC RX 637 (ALT 250 FOR IP): Performed by: STUDENT IN AN ORGANIZED HEALTH CARE EDUCATION/TRAINING PROGRAM

## 2022-08-16 PROCEDURE — C9113 INJ PANTOPRAZOLE SODIUM, VIA: HCPCS | Performed by: NURSE PRACTITIONER

## 2022-08-16 PROCEDURE — 2580000003 HC RX 258: Performed by: NURSE PRACTITIONER

## 2022-08-16 RX ADMIN — SODIUM CHLORIDE, POTASSIUM CHLORIDE, SODIUM LACTATE AND CALCIUM CHLORIDE: 600; 310; 30; 20 INJECTION, SOLUTION INTRAVENOUS at 10:18

## 2022-08-16 RX ADMIN — SODIUM CHLORIDE, PRESERVATIVE FREE 40 MG: 5 INJECTION INTRAVENOUS at 08:40

## 2022-08-16 RX ADMIN — ENOXAPARIN SODIUM 40 MG: 100 INJECTION SUBCUTANEOUS at 08:40

## 2022-08-16 RX ADMIN — HYDROMORPHONE HYDROCHLORIDE 0.25 MG: 1 INJECTION, SOLUTION INTRAMUSCULAR; INTRAVENOUS; SUBCUTANEOUS at 07:19

## 2022-08-16 ASSESSMENT — PAIN DESCRIPTION - DESCRIPTORS: DESCRIPTORS: ACHING;CRAMPING;DISCOMFORT

## 2022-08-16 ASSESSMENT — PAIN DESCRIPTION - ORIENTATION: ORIENTATION: RIGHT;LEFT

## 2022-08-16 ASSESSMENT — PAIN SCALES - GENERAL: PAINLEVEL_OUTOF10: 9

## 2022-08-16 ASSESSMENT — PAIN DESCRIPTION - LOCATION: LOCATION: ABDOMEN

## 2022-08-16 NOTE — PLAN OF CARE
Problem: Pain  Goal: Verbalizes/displays adequate comfort level or baseline comfort level  8/15/2022 2220 by Jones Ortiz RN  Outcome: Progressing  8/15/2022 1841 by Anthony Freed RN  Outcome: Progressing     Problem: Nutrition Deficit:  Goal: Optimize nutritional status  8/15/2022 2220 by Jones Ortiz RN  Outcome: Progressing  8/15/2022 1841 by Anthony Freed RN  Outcome: Progressing  8/15/2022 1137 by Nury Abdi RD  Flowsheets (Taken 8/15/2022 1137)  Nutrient intake appropriate for improving, restoring, or maintaining nutritional needs:   Assess nutritional status and recommend course of action   Monitor oral intake, labs, and treatment plans   Recommend appropriate diets, oral nutritional supplements, and vitamin/mineral supplements

## 2022-08-16 NOTE — TELEPHONE ENCOUNTER
Attempt to call  patient to schedule consult with Dr Aubrie Myers or Brooks Madden in [facility] for gallbladder, vm not setup.

## 2022-08-16 NOTE — DISCHARGE SUMMARY
[facility]  Office: [address], DO, Dhaval Martinez, DO, Yoselinjie Flannery, DO, Josselin Hornyanick Forte, DO, Marcellus Rowland MD, Baylee Ruiz MD, Neisha Bucio MD, Mimi Fernandez MD,  Magalis Dyer MD, Shreya Gonzales MD, Roger Mendoza DO, Sidney Jensen MD,  Ibis Hood MD, Farrukh Pedersen MD, Sunshine Guerra DO, Phuong Rubin MD, Lesley Urias MD, Wood Islas MD, Nolan Redmond DO, Regina Bucio MD, Ayesha Carlin MD, Stella So, CNP,  Linda Brain, CNP, Kristen Cooper, CNP, Tyler Gold, CNP, Hiro Flores PA-C, Stephanie Muniz, DNP, Vivienne Cade, CNP, Angelia Garnica, CNP, Jose E Hyatt, CNP, Karen Sarabia, [facility], Summerwinter Wilson, CNP, Clementine Fret, CNS, Monda Silva, [facility], Denise Poole, CNP, Shahrzad Counter, CNP, Cindy Cancel, CNP           [facility] [place] [facility]    Discharge Summary     Patient ID: Taryn Ni  :  2000   MRN: [id number]     ACCOUNT:  [de-identified]   Patient's PCP: No primary care provider on file. Admit Date: 2022   Discharge Date: 2022   Length of Stay: 3  Code Status:  Full Code  Admitting Physician: Roger Mendoza DO  Discharge Physician: Mimi Fernandez MD     Active Discharge Diagnoses:     Hospital Problem Lists:  Active Problems:    Acute pancreatitis, unspecified complication status, unspecified pancreatitis type    Alcohol induced acute pancreatitis without necrosis or infection    Gallstone pancreatitis    Pain of upper abdomen    Elevated LFTs  Resolved Problems:    * No resolved hospital problems.  *      Admission Condition:  poor     Discharged Condition: good    Hospital Stay:     Hospital Course:      Taryn Ni is a 25 y.o. w hx of methamphetamine abuse presented to [facility] ED for intractable nausea vomiting abdominal pain found to have a lipase of greater than 3000  He had no evidence of ORA  He was hypokalemia  CT abd pelvis = pancratitis uncomplicated no necrosis no pseudocyst  Patient was admitted and treated with aggressive hydration and pain control with improvement in his symptoms  And has known history of gallstones and there was a plan for cholecystectomy but the patient due to certain circumstances [not sure if the patient did not follow-up with the surgeon resigned] surgery did not get done. On 8/16 patient tolerated general diet and GI was okay with discharge, I did discuss the patient with our surgeon Dr Renetta Sparks and gave him patient MRN number  and plant to arrange for lap juan as an outpatient     Significant therapeutic interventions: as above        Significant Diagnostic Studies:   Labs / Micro:  CBC:   Lab Results   Component Value Date/Time    WBC 12.6 08/16/2022 06:02 AM    RBC 4.42 08/16/2022 06:02 AM    HGB 13.7 08/16/2022 06:02 AM    HCT 38.5 08/16/2022 06:02 AM    MCV 87.1 08/16/2022 06:02 AM    MCH 31.0 08/16/2022 06:02 AM    MCHC 35.6 08/16/2022 06:02 AM    RDW 11.9 08/16/2022 06:02 AM     08/16/2022 06:02 AM     CMP:    Lab Results   Component Value Date/Time    GLUCOSE 91 08/16/2022 06:02 AM     08/16/2022 06:02 AM    K 3.7 08/16/2022 06:02 AM     08/16/2022 06:02 AM    CO2 26 08/16/2022 06:02 AM    BUN 4 08/16/2022 06:02 AM    CREATININE 0.51 08/16/2022 06:02 AM    ANIONGAP 10 08/16/2022 06:02 AM    ALKPHOS 73 08/16/2022 06:02 AM     08/16/2022 06:02 AM    AST 17 08/16/2022 06:02 AM    BILITOT 0.81 08/16/2022 06:02 AM    LABALBU 3.1 08/16/2022 06:02 AM    ALBUMIN 1.2 08/16/2022 06:02 AM    LABGLOM  08/16/2022 06:02 AM     Pediatric GFR requires additional information. Refer to [facility] website for calculator.     GFRAA NOT REPORTED 02/01/2022 09:58 PM    GFR      08/16/2022 06:02 AM    PROT 5.6 08/16/2022 06:02 AM    CALCIUM 8.4 08/16/2022 06:02 AM     PT/INR:    Lab Results   Component Value Date/Time    PROTIME 13.4 08/13/2022 09:15 AM    INR 1.0 08/13/2022 09:15 AM     U/A:    Lab Results   Component Value Date/Time    COLORU Yellow 06/22/2022 06:41 PM    TURBIDITY Clear 06/22/2022 06:41 PM    SPECGRAV 1.010 06/22/2022 06:41 PM    HGBUR NEGATIVE 06/22/2022 06:41 PM    PHUR 8.0 06/22/2022 06:41 PM    PROTEINU NEGATIVE 06/22/2022 06:41 PM    GLUCOSEU NEGATIVE 06/22/2022 06:41 PM    KETUA NEGATIVE 06/22/2022 06:41 PM    BILIRUBINUR NEGATIVE 06/22/2022 06:41 PM    UROBILINOGEN Normal 06/22/2022 06:41 PM    NITRU NEGATIVE 06/22/2022 06:41 PM    LEUKOCYTESUR NEGATIVE 06/22/2022 06:41 PM     TSH:  No results found for: TSH     Radiology:  XR CHEST (SINGLE VIEW FRONTAL)    Result Date: 8/14/2022  No acute cardiopulmonary abnormality. CT ABDOMEN PELVIS W IV CONTRAST Additional Contrast? None    Result Date: 8/13/2022  Enlarged edematous pancreas with adjacent free fluid concerning for acute pancreatitis. Correlate with serum lipase. No evidence of bowel obstruction or thickening. Normal appendix. US GALLBLADDER RUQ    Result Date: 8/14/2022  Cholelithiasis without other findings to suggest acute cholecystitis. Mild hepatic steatosis. Consultations:    Consults:     Final Specialist Recommendations/Findings:   IP CONSULT TO GI      The patient was seen and examined on day of discharge     A&O X 3  CTAB  NSR, NO MRG  Soft abdomen , +BS  No swelling  and pulse palpable .     Discharge plan:     Disposition: Home    Physician Follow Up:      MD Chuck Rasmussen MD  Gastroenterology [phone] [place] [address] [address] [facility] [address]  [address] [facility] [address] [facility] [address]       Requiring Further Evaluation/Follow Up POST HOSPITALIZATION/Incidental Findings:     Diet: low fat, low cholesterol diet    Activity: As tolerated    Instructions to Patient:     Discharge Medications:      Medication List        START taking these medications HYDROcodone-acetaminophen 5-325 MG per tablet  Commonly known as: Norco  Take 1 tablet by mouth every 4 hours as needed for Pain for up to 3 days. Intended supply: 3 days. Take lowest dose possible to manage pain               Where to Get Your Medications        These medications were sent to [facility] [address] [facility], [address] [facility]  [address], [address]  [address]      Phone: [phone]   HYDROcodone-acetaminophen 5-325 MG per tablet         No discharge procedures on file. Time Spent on discharge is  33 mins in patient examination, evaluation, counseling as well as medication reconciliation, prescriptions for required medications, discharge plan and follow up. Electronically signed by   Carolan Schilder, MD  8/16/2022  3:52 PM      Thank you Dr. Angelica Bernal primary care provider on file. for the opportunity to be involved in this patient's care.

## 2022-08-16 NOTE — PROGRESS NOTES
Pt given discharge instructions and information about followup. Pt to make followup appointments today.  IV removed and pt left ambulatory with girlfriend

## 2022-08-16 NOTE — PROGRESS NOTES
[facility]. [facility]   Gastroenterology Progress Note    Esperanza Banuelos is a 25 y.o. male patient. Hospitalization Day:3      Chief consult reason:     Pancreatitis    Subjective:  Pt seen and examined. Patient appears to be in less pain today and feeling stronger. Large BM this morning, ambulating in hallway. Pain is greatly improved  No nausea vomiting or diarrhea  Tolerating diet without issue  Patient would like to go home today    VITALS:  BP (!) 143/85   Pulse (!) 104   Temp 98.6 °F (37 °C) (Temporal)   Resp 20   Ht 6' 2\" (1.88 m)   Wt 203 lb 14.8 oz (92.5 kg)   SpO2 98%   BMI 26.18 kg/m²   TEMPERATURE:  Current - Temp: 98.6 °F (37 °C); Max - Temp  Av.6 °F (37 °C)  Min: 97.7 °F (36.5 °C)  Max: 99.3 °F (37.4 °C)    Physical Assessment:  General appearance:  alert, cooperative and slight pain distress  Mental Status:  oriented to person, place and time and normal affect  Lungs:  clear to auscultation bilaterally, normal effort  Heart:  regular rate and rhythm, no murmur  Abdomen:  soft, slightly tender, nondistended, normal bowel sounds, no masses, hepatomegaly, splenomegaly  Extremities:  no edema, redness, tenderness in the calves  Skin:  no gross lesions, rashes, induration    Data Review:    Labs and Imaging:     CBC:  Recent Labs     22  0842 08/15/22  0601 22  0602   WBC 14.8* 13.2 12.6   HGB 14.8 13.6 13.7   MCV 92.0 88.1 87.1   RDW 12.5 12.1 11.9    137* 140         ANEMIA STUDIES:  No results for input(s): LABIRON, TIBC, FERRITIN, YAVTNSIA82, FOLATE, OCCULTBLD in the last 72 hours.     BMP:  Recent Labs     22  0842 08/15/22  0601 22  0602    139 137   K 3.4* 3.8 3.7    102 101   CO2 22 26 26   BUN 6 5* 4*   CREATININE 0.49* 0.57* 0.51*   GLUCOSE 99 93 91   CALCIUM 8.3* 8.4* 8.4*         LFTS:  Recent Labs     22  0842 08/15/22  0601 22  0602   ALKPHOS 104 80 73   * 160* 102*   AST 76* 28 17   BILITOT 0.91 1. 05 0.81   LABALBU 3.9 3.2* 3.1*         Amylase/Lipase and Ammonia:  Recent Labs     08/14/22  0842 08/15/22  0601 08/16/22  0602   AMYLASE 484* 203* 82   LIPASE 527* 103* 29         Acute Hepatitis Panel:  No results found for: HEPBSAG, HEPCAB, HEPBIGM, HEPAIGM    HCV Genotype:  No results found for: HEPATITISCGENOTYPE    HCV Quantitative:  No results found for: HCVQNT    LIVER WORK UP:    AFP  No results found for: AFP    Alpha 1 antitrypsin   No results found for: A1A    DILLAN  Lab Results   Component Value Date/Time    DILLAN NEGATIVE 08/14/2022 02:38 PM       AMA  No results found for: MITOAB    ASMA  No results found for: SMOOTHMUSCAB    PT/INR  No results for input(s): PROTIME, INR in the last 72 hours. Cancer Markers:  CEA:  No results for input(s): CEA in the last 72 hours. Ca 125:  No results for input(s):  in the last 72 hours. Ca 19-9:   Invalid input(s):   AFP: No results for input(s): AFP in the last 72 hours. Lactic acid:Invalid input(s): LACTIC ACID    Radiology Review:    No results found. Active Problems:    Acute pancreatitis, unspecified complication status, unspecified pancreatitis type    Alcohol induced acute pancreatitis without necrosis or infection    Gallstone pancreatitis    Pain of upper abdomen    Elevated LFTs  Resolved Problems:    * No resolved hospital problems. *       GI Impression:    Risk associated alcohol misuse disorder  Polysubstance abuse  Suspected gallstone pancreatitis with fluid collection-suspect pt passed stone already. -BISAP score 1. No pleural effusions/consolidations on chest xray.    4.  Cholelithiasis    Plan and Recommendations:    Low fat, low acid diet  Pt counseled at length regarding abstinence from drugs, alcohol-pt verbalized understanding and intention to stop  Pt will need to follow up OP with [facility]  Pt will need to follow up in the GI office with Dr. Jessica Matthews in 2 weeks for ongoing care  GI will sign off      This plan was formulated in collaboration with Dr. Chaparro Zarate MD    Thank you for allowing me to participate in the care of your patient. Please feel free to contact me with any questions or concerns. [address] [facility]   Vicky Aguiar, [address] [place] [address]   [phone]  8/16/2022  2:47 PM    This note was created with the assistance of a speech-recognition program.  Although the intention is to generate a document that actually reflects the content of the visit, no guarantees can be provided that every mistake has been identified and corrected by editing.

## 2022-08-17 LAB
IGG 1: 307 MG/DL (ref 240–1118)
IGG 2: 194 MG/DL (ref 124–549)
IGG 3: 26 MG/DL (ref 21–134)
IGG 4: 93 MG/DL (ref 1–123)

## 2022-09-24 ENCOUNTER — HOSPITAL ENCOUNTER (EMERGENCY)
Age: 22
Discharge: HOME | End: 2022-09-24
Attending: FAMILY MEDICINE
Payer: MEDICAID

## 2022-09-24 VITALS
WEIGHT: 190 LBS | HEIGHT: 74 IN | BODY MASS INDEX: 24.38 KG/M2 | DIASTOLIC BLOOD PRESSURE: 59 MMHG | HEART RATE: 72 BPM | TEMPERATURE: 97.8 F | SYSTOLIC BLOOD PRESSURE: 101 MMHG | RESPIRATION RATE: 18 BRPM | OXYGEN SATURATION: 98 %

## 2022-09-24 DIAGNOSIS — R55 VASOVAGAL SYNCOPE: Primary | ICD-10-CM

## 2022-09-24 DIAGNOSIS — K80.50 CHOLEDOCHOLITHIASIS: ICD-10-CM

## 2022-09-24 LAB
ABSOLUTE EOS #: 0.1 K/UL (ref 0–0.4)
ABSOLUTE LYMPH #: 2.7 K/UL (ref 1.2–5.2)
ABSOLUTE MONO #: 0.5 K/UL (ref 0–1)
ALBUMIN SERPL-MCNC: 4.3 G/DL (ref 3.5–5.2)
ALP BLD-CCNC: 69 U/L (ref 40–129)
ALT SERPL-CCNC: 344 U/L (ref 5–41)
ANION GAP SERPL CALCULATED.3IONS-SCNC: 10 MMOL/L (ref 9–17)
AST SERPL-CCNC: 428 U/L
BASOPHILS # BLD: 0 % (ref 0–2)
BASOPHILS ABSOLUTE: 0 K/UL (ref 0–0.2)
BILIRUB SERPL-MCNC: 1.4 MG/DL (ref 0.3–1.2)
BUN BLDV-MCNC: 13 MG/DL (ref 6–20)
BUN/CREAT BLD: 19 (ref 9–20)
CALCIUM SERPL-MCNC: 8.6 MG/DL (ref 8.6–10.4)
CHLORIDE BLD-SCNC: 102 MMOL/L (ref 98–107)
CO2: 27 MMOL/L (ref 20–31)
CREAT SERPL-MCNC: 0.69 MG/DL (ref 0.7–1.2)
DIFFERENTIAL TYPE: YES
EOSINOPHILS RELATIVE PERCENT: 1 % (ref 0–5)
GFR NON-AFRICAN AMERICAN: ABNORMAL ML/MIN
GFR SERPL CREATININE-BSD FRML MDRD: ABNORMAL ML/MIN/{1.73_M2}
GLUCOSE BLD-MCNC: 114 MG/DL (ref 70–99)
HCT VFR BLD CALC: 40.4 % (ref 41–53)
HEMOGLOBIN: 13.9 G/DL (ref 13.5–17.5)
INR BLD: 0.9
LIPASE: 34 U/L (ref 13–60)
LYMPHOCYTES # BLD: 40 % (ref 13–44)
MCH RBC QN AUTO: 30.1 PG (ref 26–34)
MCHC RBC AUTO-ENTMCNC: 34.3 G/DL (ref 31–37)
MCV RBC AUTO: 87.6 FL (ref 80–100)
MONOCYTES # BLD: 8 % (ref 5–9)
PARTIAL THROMBOPLASTIN TIME: 25 SEC (ref 23.9–33.8)
PDW BLD-RTO: 13.7 % (ref 12.1–15.2)
PLATELET # BLD: 199 K/UL (ref 140–450)
POTASSIUM SERPL-SCNC: 3.9 MMOL/L (ref 3.7–5.3)
PROTHROMBIN TIME: 12.8 SEC (ref 11.5–14.2)
RBC # BLD: 4.61 M/UL (ref 4.5–5.9)
SEG NEUTROPHILS: 51 % (ref 39–75)
SEGMENTED NEUTROPHILS ABSOLUTE COUNT: 3.4 K/UL (ref 2.1–6.5)
SODIUM BLD-SCNC: 139 MMOL/L (ref 135–144)
TOTAL PROTEIN: 6.3 G/DL (ref 6.4–8.3)
TROPONIN, HIGH SENSITIVITY: <6 NG/L (ref 0–22)
WBC # BLD: 6.7 K/UL (ref 4.5–13.5)

## 2022-09-24 PROCEDURE — 85730 THROMBOPLASTIN TIME PARTIAL: CPT

## 2022-09-24 PROCEDURE — 99284 EMERGENCY DEPT VISIT MOD MDM: CPT

## 2022-09-24 PROCEDURE — 85610 PROTHROMBIN TIME: CPT

## 2022-09-24 PROCEDURE — 93005 ELECTROCARDIOGRAM TRACING: CPT | Performed by: FAMILY MEDICINE

## 2022-09-24 PROCEDURE — 83690 ASSAY OF LIPASE: CPT

## 2022-09-24 PROCEDURE — 84484 ASSAY OF TROPONIN QUANT: CPT

## 2022-09-24 PROCEDURE — 36415 COLL VENOUS BLD VENIPUNCTURE: CPT

## 2022-09-24 PROCEDURE — 85025 COMPLETE CBC W/AUTO DIFF WBC: CPT

## 2022-09-24 PROCEDURE — 80053 COMPREHEN METABOLIC PANEL: CPT

## 2022-09-24 RX ORDER — IBUPROFEN 200 MG
1000 TABLET ORAL EVERY 6 HOURS PRN
Status: ON HOLD | COMMUNITY
End: 2022-10-02 | Stop reason: HOSPADM

## 2022-09-24 ASSESSMENT — PAIN - FUNCTIONAL ASSESSMENT: PAIN_FUNCTIONAL_ASSESSMENT: NONE - DENIES PAIN

## 2022-09-25 LAB
EKG ATRIAL RATE: 76 BPM
EKG P AXIS: 60 DEGREES
EKG P-R INTERVAL: 178 MS
EKG Q-T INTERVAL: 378 MS
EKG QRS DURATION: 106 MS
EKG QTC CALCULATION (BAZETT): 425 MS
EKG R AXIS: 46 DEGREES
EKG T AXIS: 33 DEGREES
EKG VENTRICULAR RATE: 76 BPM

## 2022-09-25 PROCEDURE — 93010 ELECTROCARDIOGRAM REPORT: CPT | Performed by: INTERNAL MEDICINE

## 2022-09-25 NOTE — ED PROVIDER NOTES
Currently    Drug use: Yes     Types: Marijuana ([place]), Methamphetamines (Crystal Meth)     Comment: last use 2 days ago. PHYSICAL EXAM    VITAL SIGNS: BP (!) 101/59   Pulse 72   Temp 97.8 °F (36.6 °C) (Oral)   Resp 18   Ht 6' 2\" (1.88 m)   Wt 190 lb (86.2 kg)   SpO2 98%   BMI 24.39 kg/m²    Constitutional:  Well developed, well nourished, no acute distress   HENT:  Atraumatic, external ears normal, nose normal, oropharynx moist. Neck- supple   Respiratory:  Lungs clear   Cardiovascular:  RRR without murmurs or gallops   GI:  Soft, nondistended, normal bowel sounds, nontender, no organomegaly, no mass, no rebound, no guarding   Musculoskeletal:  No edema, no tenderness, no deformities. Back- no tenderness   Integument:  Well hydrated, no rash   Neurologic:  Alert & oriented x 3, no focal deficits noted     EKG        RADIOLOGY/PROCEDURES        ED COURSE & MEDICAL DECISION MAKING    Pertinent Labs & Imaging studies reviewed. (See chart for details)  Patient was stable on discharge. He will follow up with his specialists. Labs Reviewed   CBC WITH AUTO DIFFERENTIAL - Abnormal; Notable for the following components:       Result Value    Hematocrit 40.4 (*)     All other components within normal limits   COMPREHENSIVE METABOLIC PANEL - Abnormal; Notable for the following components:    Glucose 114 (*)     Creatinine 0.69 (*)      (*)      (*)     Total Bilirubin 1.4 (*)     Total Protein 6.3 (*)     All other components within normal limits   LIPASE   APTT   PROTIME-INR   TROPONIN       FINAL IMPRESSION    1. Vasovagal syncope  2. Choledocholithiasis    Summation      Patient Course: Patient was stable on discharge. ED Medications administered this visit:  Medications - No data to display    New Prescriptions from this visit:    Discharge Medication List as of 9/24/2022  7:44 PM          Follow-up:  Your specialists for scheduled appointments.     Call in 2 days        Final Impression: 1. Vasovagal syncope    2.  Choledocholithiasis               (Please note that portions of this note were completed with a voice recognition program.  Efforts were made to edit the dictations but occasionally words are mis-transcribed.)      Roderick Romberg, MD  09/25/22 [id number]

## 2022-09-27 ENCOUNTER — APPOINTMENT (OUTPATIENT)
Dept: CT IMAGING | Age: 22
End: 2022-09-27
Payer: MEDICAID

## 2022-09-27 ENCOUNTER — HOSPITAL ENCOUNTER (EMERGENCY)
Age: 22
Discharge: ANOTHER HOSPITAL | End: 2022-09-28
Attending: EMERGENCY MEDICINE
Payer: MEDICAID

## 2022-09-27 DIAGNOSIS — R52 INTRACTABLE PAIN: ICD-10-CM

## 2022-09-27 DIAGNOSIS — K85.10 ACUTE BILIARY PANCREATITIS, UNSPECIFIED COMPLICATION STATUS: Primary | ICD-10-CM

## 2022-09-27 DIAGNOSIS — Z87.19 HISTORY OF GALLSTONES: ICD-10-CM

## 2022-09-27 DIAGNOSIS — E80.6 HYPERBILIRUBINEMIA: ICD-10-CM

## 2022-09-27 LAB
ABSOLUTE EOS #: 0.1 K/UL (ref 0–0.4)
ABSOLUTE LYMPH #: 2.4 K/UL (ref 1.2–5.2)
ABSOLUTE MONO #: 0.6 K/UL (ref 0–1)
ALBUMIN SERPL-MCNC: 4.6 G/DL (ref 3.5–5.2)
ALP BLD-CCNC: 140 U/L (ref 40–129)
ALT SERPL-CCNC: 372 U/L (ref 5–41)
ANION GAP SERPL CALCULATED.3IONS-SCNC: 17 MMOL/L (ref 9–17)
AST SERPL-CCNC: 167 U/L
BASOPHILS # BLD: 0 % (ref 0–2)
BASOPHILS ABSOLUTE: 0 K/UL (ref 0–0.2)
BILIRUB SERPL-MCNC: 8.5 MG/DL (ref 0.3–1.2)
BUN BLDV-MCNC: 21 MG/DL (ref 6–20)
BUN/CREAT BLD: 38 (ref 9–20)
CALCIUM SERPL-MCNC: 9.6 MG/DL (ref 8.6–10.4)
CHLORIDE BLD-SCNC: 104 MMOL/L (ref 98–107)
CO2: 19 MMOL/L (ref 20–31)
CREAT SERPL-MCNC: 0.55 MG/DL (ref 0.7–1.2)
DIFFERENTIAL TYPE: YES
EOSINOPHILS RELATIVE PERCENT: 1 % (ref 0–5)
GFR NON-AFRICAN AMERICAN: ABNORMAL ML/MIN
GFR SERPL CREATININE-BSD FRML MDRD: ABNORMAL ML/MIN/{1.73_M2}
GLUCOSE BLD-MCNC: 120 MG/DL (ref 70–99)
HCT VFR BLD CALC: 46.3 % (ref 41–53)
HEMOGLOBIN: 15.8 G/DL (ref 13.5–17.5)
LACTIC ACID: 1.5 MMOL/L (ref 0.5–2.2)
LIPASE: >3000 U/L (ref 13–60)
LYMPHOCYTES # BLD: 24 % (ref 13–44)
MCH RBC QN AUTO: 29.8 PG (ref 26–34)
MCHC RBC AUTO-ENTMCNC: 34.1 G/DL (ref 31–37)
MCV RBC AUTO: 87.3 FL (ref 80–100)
MONOCYTES # BLD: 6 % (ref 5–9)
PDW BLD-RTO: 14.3 % (ref 12.1–15.2)
PLATELET # BLD: 202 K/UL (ref 140–450)
POTASSIUM SERPL-SCNC: 3.8 MMOL/L (ref 3.7–5.3)
RBC # BLD: 5.31 M/UL (ref 4.5–5.9)
SARS-COV-2, RAPID: NOT DETECTED
SEG NEUTROPHILS: 69 % (ref 39–75)
SEGMENTED NEUTROPHILS ABSOLUTE COUNT: 7.1 K/UL (ref 2.1–6.5)
SODIUM BLD-SCNC: 140 MMOL/L (ref 135–144)
SPECIMEN DESCRIPTION: NORMAL
TOTAL PROTEIN: 7 G/DL (ref 6.4–8.3)
WBC # BLD: 10.2 K/UL (ref 4.5–13.5)

## 2022-09-27 PROCEDURE — 6360000002 HC RX W HCPCS: Performed by: EMERGENCY MEDICINE

## 2022-09-27 PROCEDURE — 2580000003 HC RX 258: Performed by: EMERGENCY MEDICINE

## 2022-09-27 PROCEDURE — 6360000004 HC RX CONTRAST MEDICATION: Performed by: EMERGENCY MEDICINE

## 2022-09-27 PROCEDURE — 83605 ASSAY OF LACTIC ACID: CPT

## 2022-09-27 PROCEDURE — 80053 COMPREHEN METABOLIC PANEL: CPT

## 2022-09-27 PROCEDURE — 99285 EMERGENCY DEPT VISIT HI MDM: CPT

## 2022-09-27 PROCEDURE — 87040 BLOOD CULTURE FOR BACTERIA: CPT

## 2022-09-27 PROCEDURE — 96374 THER/PROPH/DIAG INJ IV PUSH: CPT

## 2022-09-27 PROCEDURE — 85025 COMPLETE CBC W/AUTO DIFF WBC: CPT

## 2022-09-27 PROCEDURE — 36415 COLL VENOUS BLD VENIPUNCTURE: CPT

## 2022-09-27 PROCEDURE — 74177 CT ABD & PELVIS W/CONTRAST: CPT

## 2022-09-27 PROCEDURE — 83690 ASSAY OF LIPASE: CPT

## 2022-09-27 PROCEDURE — 96376 TX/PRO/DX INJ SAME DRUG ADON: CPT

## 2022-09-27 PROCEDURE — 87635 SARS-COV-2 COVID-19 AMP PRB: CPT

## 2022-09-27 PROCEDURE — 96375 TX/PRO/DX INJ NEW DRUG ADDON: CPT

## 2022-09-27 RX ORDER — ONDANSETRON 2 MG/ML
4 INJECTION INTRAMUSCULAR; INTRAVENOUS ONCE
Status: COMPLETED | OUTPATIENT
Start: 2022-09-27 | End: 2022-09-27

## 2022-09-27 RX ORDER — KETOROLAC TROMETHAMINE 15 MG/ML
15 INJECTION, SOLUTION INTRAMUSCULAR; INTRAVENOUS ONCE
Status: COMPLETED | OUTPATIENT
Start: 2022-09-27 | End: 2022-09-27

## 2022-09-27 RX ADMIN — ONDANSETRON 4 MG: 2 INJECTION INTRAMUSCULAR; INTRAVENOUS at 22:25

## 2022-09-27 RX ADMIN — IOPAMIDOL 75 ML: 755 INJECTION, SOLUTION INTRAVENOUS at 22:46

## 2022-09-27 RX ADMIN — HYDROMORPHONE HYDROCHLORIDE 1 MG: 1 INJECTION, SOLUTION INTRAMUSCULAR; INTRAVENOUS; SUBCUTANEOUS at 23:19

## 2022-09-27 RX ADMIN — MEROPENEM 1000 MG: 1 INJECTION, POWDER, FOR SOLUTION INTRAVENOUS at 23:22

## 2022-09-27 RX ADMIN — ONDANSETRON 4 MG: 2 INJECTION INTRAMUSCULAR; INTRAVENOUS at 23:19

## 2022-09-27 RX ADMIN — KETOROLAC TROMETHAMINE 15 MG: 15 INJECTION, SOLUTION INTRAMUSCULAR; INTRAVENOUS at 22:26

## 2022-09-27 ASSESSMENT — PAIN DESCRIPTION - ORIENTATION
ORIENTATION: RIGHT
ORIENTATION: RIGHT;LEFT;MID

## 2022-09-27 ASSESSMENT — PAIN DESCRIPTION - LOCATION
LOCATION: ABDOMEN
LOCATION: ABDOMEN

## 2022-09-27 ASSESSMENT — PAIN - FUNCTIONAL ASSESSMENT: PAIN_FUNCTIONAL_ASSESSMENT: 0-10

## 2022-09-27 ASSESSMENT — PAIN DESCRIPTION - DESCRIPTORS: DESCRIPTORS: STABBING

## 2022-09-28 ENCOUNTER — HOSPITAL ENCOUNTER (INPATIENT)
Age: 22
LOS: 4 days | Discharge: HOME | DRG: 263 | End: 2022-10-02
Attending: EMERGENCY MEDICINE | Admitting: INTERNAL MEDICINE
Payer: MEDICAID

## 2022-09-28 ENCOUNTER — APPOINTMENT (OUTPATIENT)
Dept: ULTRASOUND IMAGING | Age: 22
DRG: 263 | End: 2022-09-28
Payer: MEDICAID

## 2022-09-28 ENCOUNTER — APPOINTMENT (OUTPATIENT)
Dept: GENERAL RADIOLOGY | Age: 22
DRG: 263 | End: 2022-09-28
Payer: MEDICAID

## 2022-09-28 VITALS
RESPIRATION RATE: 16 BRPM | HEART RATE: 82 BPM | BODY MASS INDEX: 24.38 KG/M2 | DIASTOLIC BLOOD PRESSURE: 105 MMHG | HEIGHT: 74 IN | OXYGEN SATURATION: 100 % | SYSTOLIC BLOOD PRESSURE: 153 MMHG | TEMPERATURE: 97.9 F | WEIGHT: 190 LBS

## 2022-09-28 DIAGNOSIS — K85.90 ACUTE PANCREATITIS, UNSPECIFIED COMPLICATION STATUS, UNSPECIFIED PANCREATITIS TYPE: Primary | ICD-10-CM

## 2022-09-28 DIAGNOSIS — K80.21 CALCULUS OF GALLBLADDER WITH BILIARY OBSTRUCTION BUT WITHOUT CHOLECYSTITIS: ICD-10-CM

## 2022-09-28 DIAGNOSIS — K80.00 CALCULUS OF GALLBLADDER WITH ACUTE CHOLECYSTITIS WITHOUT OBSTRUCTION: ICD-10-CM

## 2022-09-28 LAB
ABSOLUTE EOS #: <0.03 K/UL (ref 0–0.44)
ABSOLUTE IMMATURE GRANULOCYTE: 0.05 K/UL (ref 0–0.3)
ABSOLUTE LYMPH #: 0.82 K/UL (ref 1.2–5.2)
ABSOLUTE MONO #: 0.54 K/UL (ref 0.1–1.4)
ALBUMIN SERPL-MCNC: 4.8 G/DL (ref 3.5–5.2)
ALBUMIN/GLOBULIN RATIO: 1.9 (ref 1–2.5)
ALP BLD-CCNC: 149 U/L (ref 40–129)
ALT SERPL-CCNC: 397 U/L (ref 5–41)
AMPHETAMINE SCREEN URINE: POSITIVE
ANION GAP SERPL CALCULATED.3IONS-SCNC: 17 MMOL/L (ref 9–17)
AST SERPL-CCNC: 151 U/L
BARBITURATE SCREEN URINE: NEGATIVE
BASOPHILS # BLD: 0 % (ref 0–2)
BASOPHILS ABSOLUTE: 0.03 K/UL (ref 0–0.2)
BENZODIAZEPINE SCREEN, URINE: NEGATIVE
BILIRUB SERPL-MCNC: 6.7 MG/DL (ref 0.3–1.2)
BILIRUBIN DIRECT: 2.8 MG/DL
BUN BLDV-MCNC: 20 MG/DL (ref 6–20)
CALCIUM SERPL-MCNC: 9.5 MG/DL (ref 8.6–10.4)
CANNABINOID SCREEN URINE: POSITIVE
CHLORIDE BLD-SCNC: 100 MMOL/L (ref 98–107)
CO2: 21 MMOL/L (ref 20–31)
COCAINE METABOLITE, URINE: NEGATIVE
CREAT SERPL-MCNC: 0.55 MG/DL (ref 0.7–1.2)
EOSINOPHILS RELATIVE PERCENT: 0 % (ref 1–4)
ETHANOL PERCENT: <0.01 %
ETHANOL: <10 MG/DL
FENTANYL URINE: NEGATIVE
GFR NON-AFRICAN AMERICAN: ABNORMAL ML/MIN
GFR SERPL CREATININE-BSD FRML MDRD: ABNORMAL ML/MIN/{1.73_M2}
GLUCOSE BLD-MCNC: 130 MG/DL (ref 70–99)
HCT VFR BLD CALC: 47.5 % (ref 40.7–50.3)
HCT VFR BLD CALC: 50.6 % (ref 40.7–50.3)
HEMOGLOBIN: 16 G/DL (ref 13–17)
HEMOGLOBIN: 17.4 G/DL (ref 13–17)
IMMATURE GRANULOCYTES: 0 %
LACTIC ACID, SEPSIS WHOLE BLOOD: 1.3 MMOL/L (ref 0.5–1.9)
LIPASE: >3000 U/L (ref 13–60)
LYMPHOCYTES # BLD: 6 % (ref 25–45)
MAGNESIUM: 2.1 MG/DL (ref 1.7–2.2)
MCH RBC QN AUTO: 30.4 PG (ref 25.2–33.5)
MCH RBC QN AUTO: 30.7 PG (ref 25.2–33.5)
MCHC RBC AUTO-ENTMCNC: 33.7 G/DL (ref 28.4–34.8)
MCHC RBC AUTO-ENTMCNC: 34.4 G/DL (ref 28.4–34.8)
MCV RBC AUTO: 88.5 FL (ref 82.6–102.9)
MCV RBC AUTO: 91.2 FL (ref 82.6–102.9)
METHADONE SCREEN, URINE: NEGATIVE
MONOCYTES # BLD: 4 % (ref 2–8)
NRBC AUTOMATED: 0 PER 100 WBC
NRBC AUTOMATED: 0 PER 100 WBC
OPIATES, URINE: POSITIVE
OXYCODONE SCREEN URINE: NEGATIVE
PARTIAL THROMBOPLASTIN TIME: 22.8 SEC (ref 20.5–30.5)
PARTIAL THROMBOPLASTIN TIME: 25.9 SEC (ref 20.5–30.5)
PDW BLD-RTO: 13.1 % (ref 11.8–14.4)
PDW BLD-RTO: 13.2 % (ref 11.8–14.4)
PHENCYCLIDINE, URINE: NEGATIVE
PLATELET # BLD: 219 K/UL (ref 138–453)
PLATELET # BLD: 224 K/UL (ref 138–453)
PMV BLD AUTO: 10.4 FL (ref 8.1–13.5)
PMV BLD AUTO: 10.5 FL (ref 8.1–13.5)
POTASSIUM SERPL-SCNC: 4.2 MMOL/L (ref 3.7–5.3)
RBC # BLD: 5.21 M/UL (ref 4.21–5.77)
RBC # BLD: 5.72 M/UL (ref 4.21–5.77)
SEG NEUTROPHILS: 90 % (ref 34–64)
SEGMENTED NEUTROPHILS ABSOLUTE COUNT: 11.91 K/UL (ref 1.8–8)
SODIUM BLD-SCNC: 138 MMOL/L (ref 135–144)
TEST INFORMATION: ABNORMAL
TOTAL PROTEIN: 7.3 G/DL (ref 6.4–8.3)
TSH SERPL DL<=0.05 MIU/L-ACNC: 0.74 UIU/ML (ref 0.3–5)
WBC # BLD: 11.6 K/UL (ref 4.5–13.5)
WBC # BLD: 13.4 K/UL (ref 4.5–13.5)

## 2022-09-28 PROCEDURE — 99222 1ST HOSP IP/OBS MODERATE 55: CPT | Performed by: INTERNAL MEDICINE

## 2022-09-28 PROCEDURE — 6360000002 HC RX W HCPCS: Performed by: NURSE PRACTITIONER

## 2022-09-28 PROCEDURE — 82248 BILIRUBIN DIRECT: CPT

## 2022-09-28 PROCEDURE — 76705 ECHO EXAM OF ABDOMEN: CPT

## 2022-09-28 PROCEDURE — 85027 COMPLETE CBC AUTOMATED: CPT

## 2022-09-28 PROCEDURE — 6360000002 HC RX W HCPCS

## 2022-09-28 PROCEDURE — 83690 ASSAY OF LIPASE: CPT

## 2022-09-28 PROCEDURE — 2500000003 HC RX 250 WO HCPCS: Performed by: NURSE PRACTITIONER

## 2022-09-28 PROCEDURE — C9113 INJ PANTOPRAZOLE SODIUM, VIA: HCPCS | Performed by: INTERNAL MEDICINE

## 2022-09-28 PROCEDURE — 93005 ELECTROCARDIOGRAM TRACING: CPT | Performed by: INTERNAL MEDICINE

## 2022-09-28 PROCEDURE — 83605 ASSAY OF LACTIC ACID: CPT

## 2022-09-28 PROCEDURE — 83735 ASSAY OF MAGNESIUM: CPT

## 2022-09-28 PROCEDURE — 6360000002 HC RX W HCPCS: Performed by: EMERGENCY MEDICINE

## 2022-09-28 PROCEDURE — 80053 COMPREHEN METABOLIC PANEL: CPT

## 2022-09-28 PROCEDURE — 36415 COLL VENOUS BLD VENIPUNCTURE: CPT

## 2022-09-28 PROCEDURE — 2580000003 HC RX 258: Performed by: NURSE PRACTITIONER

## 2022-09-28 PROCEDURE — 1200000000 HC SEMI PRIVATE

## 2022-09-28 PROCEDURE — 2580000003 HC RX 258: Performed by: STUDENT IN AN ORGANIZED HEALTH CARE EDUCATION/TRAINING PROGRAM

## 2022-09-28 PROCEDURE — 85730 THROMBOPLASTIN TIME PARTIAL: CPT

## 2022-09-28 PROCEDURE — 99285 EMERGENCY DEPT VISIT HI MDM: CPT

## 2022-09-28 PROCEDURE — 84443 ASSAY THYROID STIM HORMONE: CPT

## 2022-09-28 PROCEDURE — 2580000003 HC RX 258: Performed by: INTERNAL MEDICINE

## 2022-09-28 PROCEDURE — 85025 COMPLETE CBC W/AUTO DIFF WBC: CPT

## 2022-09-28 PROCEDURE — 71045 X-RAY EXAM CHEST 1 VIEW: CPT

## 2022-09-28 PROCEDURE — 93005 ELECTROCARDIOGRAM TRACING: CPT | Performed by: FAMILY MEDICINE

## 2022-09-28 PROCEDURE — 2500000003 HC RX 250 WO HCPCS: Performed by: INTERNAL MEDICINE

## 2022-09-28 PROCEDURE — G0480 DRUG TEST DEF 1-7 CLASSES: HCPCS

## 2022-09-28 PROCEDURE — 6360000002 HC RX W HCPCS: Performed by: INTERNAL MEDICINE

## 2022-09-28 PROCEDURE — 80307 DRUG TEST PRSMV CHEM ANLYZR: CPT

## 2022-09-28 PROCEDURE — 6370000000 HC RX 637 (ALT 250 FOR IP): Performed by: INTERNAL MEDICINE

## 2022-09-28 RX ORDER — MAGNESIUM SULFATE IN WATER 40 MG/ML
2000 INJECTION, SOLUTION INTRAVENOUS ONCE
Status: COMPLETED | OUTPATIENT
Start: 2022-09-28 | End: 2022-09-28

## 2022-09-28 RX ORDER — SODIUM CHLORIDE, SODIUM LACTATE, POTASSIUM CHLORIDE, CALCIUM CHLORIDE 600; 310; 30; 20 MG/100ML; MG/100ML; MG/100ML; MG/100ML
INJECTION, SOLUTION INTRAVENOUS CONTINUOUS
Status: DISCONTINUED | OUTPATIENT
Start: 2022-09-28 | End: 2022-10-02 | Stop reason: HOSPADM

## 2022-09-28 RX ORDER — METOPROLOL TARTRATE 5 MG/5ML
5 INJECTION INTRAVENOUS ONCE
Status: COMPLETED | OUTPATIENT
Start: 2022-09-28 | End: 2022-09-28

## 2022-09-28 RX ORDER — SODIUM CHLORIDE, SODIUM LACTATE, POTASSIUM CHLORIDE, AND CALCIUM CHLORIDE .6; .31; .03; .02 G/100ML; G/100ML; G/100ML; G/100ML
500 INJECTION, SOLUTION INTRAVENOUS ONCE
Status: DISCONTINUED | OUTPATIENT
Start: 2022-09-28 | End: 2022-09-28

## 2022-09-28 RX ORDER — SODIUM CHLORIDE, SODIUM LACTATE, POTASSIUM CHLORIDE, CALCIUM CHLORIDE 600; 310; 30; 20 MG/100ML; MG/100ML; MG/100ML; MG/100ML
INJECTION, SOLUTION INTRAVENOUS CONTINUOUS
Status: ACTIVE | OUTPATIENT
Start: 2022-09-28 | End: 2022-09-28

## 2022-09-28 RX ORDER — METOPROLOL TARTRATE 5 MG/5ML
10 INJECTION INTRAVENOUS ONCE
Status: COMPLETED | OUTPATIENT
Start: 2022-09-28 | End: 2022-09-28

## 2022-09-28 RX ORDER — ONDANSETRON 2 MG/ML
4 INJECTION INTRAMUSCULAR; INTRAVENOUS EVERY 6 HOURS PRN
Status: DISCONTINUED | OUTPATIENT
Start: 2022-09-28 | End: 2022-10-02 | Stop reason: HOSPADM

## 2022-09-28 RX ORDER — HEPARIN SODIUM 1000 [USP'U]/ML
4000 INJECTION, SOLUTION INTRAVENOUS; SUBCUTANEOUS ONCE
Status: COMPLETED | OUTPATIENT
Start: 2022-09-28 | End: 2022-09-28

## 2022-09-28 RX ORDER — SODIUM CHLORIDE, SODIUM LACTATE, POTASSIUM CHLORIDE, AND CALCIUM CHLORIDE .6; .31; .03; .02 G/100ML; G/100ML; G/100ML; G/100ML
1000 INJECTION, SOLUTION INTRAVENOUS ONCE
Status: COMPLETED | OUTPATIENT
Start: 2022-09-28 | End: 2022-09-28

## 2022-09-28 RX ORDER — FENTANYL CITRATE 50 UG/ML
25 INJECTION, SOLUTION INTRAMUSCULAR; INTRAVENOUS
Status: DISCONTINUED | OUTPATIENT
Start: 2022-09-28 | End: 2022-10-02 | Stop reason: HOSPADM

## 2022-09-28 RX ORDER — HEPARIN SODIUM 1000 [USP'U]/ML
4000 INJECTION, SOLUTION INTRAVENOUS; SUBCUTANEOUS PRN
Status: DISCONTINUED | OUTPATIENT
Start: 2022-09-28 | End: 2022-09-30

## 2022-09-28 RX ORDER — MAGNESIUM SULFATE 1 G/100ML
INJECTION INTRAVENOUS
Status: COMPLETED
Start: 2022-09-28 | End: 2022-09-28

## 2022-09-28 RX ORDER — SODIUM CHLORIDE 9 MG/ML
INJECTION, SOLUTION INTRAVENOUS PRN
Status: DISCONTINUED | OUTPATIENT
Start: 2022-09-28 | End: 2022-10-02 | Stop reason: HOSPADM

## 2022-09-28 RX ORDER — MAGNESIUM SULFATE IN WATER 40 MG/ML
2000 INJECTION, SOLUTION INTRAVENOUS ONCE
Status: DISCONTINUED | OUTPATIENT
Start: 2022-09-28 | End: 2022-09-28

## 2022-09-28 RX ORDER — SODIUM CHLORIDE 0.9 % (FLUSH) 0.9 %
5-40 SYRINGE (ML) INJECTION PRN
Status: DISCONTINUED | OUTPATIENT
Start: 2022-09-28 | End: 2022-10-02 | Stop reason: HOSPADM

## 2022-09-28 RX ORDER — HEPARIN SODIUM AND DEXTROSE 10000; 5 [USP'U]/100ML; G/100ML
5-30 INJECTION INTRAVENOUS CONTINUOUS
Status: DISCONTINUED | OUTPATIENT
Start: 2022-09-28 | End: 2022-09-30

## 2022-09-28 RX ORDER — DILTIAZEM HYDROCHLORIDE 5 MG/ML
10 INJECTION INTRAVENOUS ONCE
Status: COMPLETED | OUTPATIENT
Start: 2022-09-28 | End: 2022-09-28

## 2022-09-28 RX ORDER — ONDANSETRON 4 MG/1
4 TABLET, ORALLY DISINTEGRATING ORAL EVERY 8 HOURS PRN
Status: DISCONTINUED | OUTPATIENT
Start: 2022-09-28 | End: 2022-10-02 | Stop reason: HOSPADM

## 2022-09-28 RX ORDER — ASPIRIN 81 MG/1
324 TABLET, CHEWABLE ORAL DAILY
Status: DISCONTINUED | OUTPATIENT
Start: 2022-09-28 | End: 2022-10-02 | Stop reason: HOSPADM

## 2022-09-28 RX ORDER — SODIUM CHLORIDE 0.9 % (FLUSH) 0.9 %
5-40 SYRINGE (ML) INJECTION EVERY 12 HOURS SCHEDULED
Status: DISCONTINUED | OUTPATIENT
Start: 2022-09-28 | End: 2022-10-02 | Stop reason: HOSPADM

## 2022-09-28 RX ORDER — PROCHLORPERAZINE EDISYLATE 5 MG/ML
10 INJECTION INTRAMUSCULAR; INTRAVENOUS ONCE
Status: COMPLETED | OUTPATIENT
Start: 2022-09-28 | End: 2022-09-28

## 2022-09-28 RX ORDER — HEPARIN SODIUM 1000 [USP'U]/ML
2000 INJECTION, SOLUTION INTRAVENOUS; SUBCUTANEOUS PRN
Status: DISCONTINUED | OUTPATIENT
Start: 2022-09-28 | End: 2022-09-30

## 2022-09-28 RX ORDER — POTASSIUM CHLORIDE 7.45 MG/ML
10 INJECTION INTRAVENOUS PRN
Status: DISCONTINUED | OUTPATIENT
Start: 2022-09-28 | End: 2022-10-02 | Stop reason: HOSPADM

## 2022-09-28 RX ORDER — MAGNESIUM SULFATE 1 G/100ML
1000 INJECTION INTRAVENOUS PRN
Status: DISCONTINUED | OUTPATIENT
Start: 2022-09-28 | End: 2022-10-02 | Stop reason: HOSPADM

## 2022-09-28 RX ORDER — ONDANSETRON 2 MG/ML
4 INJECTION INTRAMUSCULAR; INTRAVENOUS ONCE
Status: COMPLETED | OUTPATIENT
Start: 2022-09-28 | End: 2022-09-28

## 2022-09-28 RX ORDER — ENOXAPARIN SODIUM 100 MG/ML
40 INJECTION SUBCUTANEOUS DAILY
Status: DISCONTINUED | OUTPATIENT
Start: 2022-09-28 | End: 2022-09-28

## 2022-09-28 RX ADMIN — Medication 12 UNITS/KG/HR: at 08:55

## 2022-09-28 RX ADMIN — SODIUM CHLORIDE, POTASSIUM CHLORIDE, SODIUM LACTATE AND CALCIUM CHLORIDE 1000 ML: 600; 310; 30; 20 INJECTION, SOLUTION INTRAVENOUS at 03:15

## 2022-09-28 RX ADMIN — AMIODARONE HYDROCHLORIDE 0.5 MG/MIN: 150 INJECTION, SOLUTION INTRAVENOUS at 13:51

## 2022-09-28 RX ADMIN — SODIUM CHLORIDE, POTASSIUM CHLORIDE, SODIUM LACTATE AND CALCIUM CHLORIDE: 600; 310; 30; 20 INJECTION, SOLUTION INTRAVENOUS at 15:58

## 2022-09-28 RX ADMIN — HYDROMORPHONE HYDROCHLORIDE 0.5 MG: 1 INJECTION, SOLUTION INTRAMUSCULAR; INTRAVENOUS; SUBCUTANEOUS at 11:26

## 2022-09-28 RX ADMIN — FENTANYL CITRATE 25 MCG: 50 INJECTION, SOLUTION INTRAMUSCULAR; INTRAVENOUS at 21:53

## 2022-09-28 RX ADMIN — MAGNESIUM SULFATE HEPTAHYDRATE 2000 MG: 40 INJECTION, SOLUTION INTRAVENOUS at 07:40

## 2022-09-28 RX ADMIN — METOPROLOL TARTRATE 10 MG: 5 INJECTION, SOLUTION INTRAVENOUS at 06:51

## 2022-09-28 RX ADMIN — MAGNESIUM SULFATE HEPTAHYDRATE 1000 MG: 1 INJECTION, SOLUTION INTRAVENOUS at 06:32

## 2022-09-28 RX ADMIN — HYDROMORPHONE HYDROCHLORIDE 1 MG: 1 INJECTION, SOLUTION INTRAMUSCULAR; INTRAVENOUS; SUBCUTANEOUS at 00:43

## 2022-09-28 RX ADMIN — FENTANYL CITRATE 25 MCG: 50 INJECTION, SOLUTION INTRAMUSCULAR; INTRAVENOUS at 09:31

## 2022-09-28 RX ADMIN — Medication 16 UNITS/KG/HR: at 16:46

## 2022-09-28 RX ADMIN — SODIUM CHLORIDE, PRESERVATIVE FREE 10 ML: 5 INJECTION INTRAVENOUS at 20:09

## 2022-09-28 RX ADMIN — SODIUM CHLORIDE, POTASSIUM CHLORIDE, SODIUM LACTATE AND CALCIUM CHLORIDE 500 ML: 600; 310; 30; 20 INJECTION, SOLUTION INTRAVENOUS at 07:41

## 2022-09-28 RX ADMIN — DILTIAZEM HYDROCHLORIDE 5 MG: 5 INJECTION, SOLUTION INTRAVENOUS at 07:08

## 2022-09-28 RX ADMIN — FENTANYL CITRATE 25 MCG: 50 INJECTION, SOLUTION INTRAMUSCULAR; INTRAVENOUS at 15:23

## 2022-09-28 RX ADMIN — HYDROMORPHONE HYDROCHLORIDE 0.5 MG: 1 INJECTION, SOLUTION INTRAMUSCULAR; INTRAVENOUS; SUBCUTANEOUS at 20:09

## 2022-09-28 RX ADMIN — AMIODARONE HYDROCHLORIDE 1 MG/MIN: 50 INJECTION, SOLUTION INTRAVENOUS at 08:08

## 2022-09-28 RX ADMIN — HEPARIN SODIUM 4000 UNITS: 1000 INJECTION INTRAVENOUS; SUBCUTANEOUS at 16:41

## 2022-09-28 RX ADMIN — HYDROMORPHONE HYDROCHLORIDE 0.5 MG: 1 INJECTION, SOLUTION INTRAMUSCULAR; INTRAVENOUS; SUBCUTANEOUS at 16:53

## 2022-09-28 RX ADMIN — ONDANSETRON 4 MG: 2 INJECTION INTRAMUSCULAR; INTRAVENOUS at 00:43

## 2022-09-28 RX ADMIN — ONDANSETRON 4 MG: 2 INJECTION INTRAMUSCULAR; INTRAVENOUS at 20:09

## 2022-09-28 RX ADMIN — SODIUM CHLORIDE, POTASSIUM CHLORIDE, SODIUM LACTATE AND CALCIUM CHLORIDE: 600; 310; 30; 20 INJECTION, SOLUTION INTRAVENOUS at 04:38

## 2022-09-28 RX ADMIN — SODIUM CHLORIDE, PRESERVATIVE FREE 10 ML: 5 INJECTION INTRAVENOUS at 09:48

## 2022-09-28 RX ADMIN — DILTIAZEM HYDROCHLORIDE 2.5 MG/HR: 5 INJECTION, SOLUTION INTRAVENOUS at 07:10

## 2022-09-28 RX ADMIN — AMIODARONE HYDROCHLORIDE 150 MG: 50 INJECTION, SOLUTION INTRAVENOUS at 07:56

## 2022-09-28 RX ADMIN — ASPIRIN 81 MG 324 MG: 81 TABLET ORAL at 06:45

## 2022-09-28 RX ADMIN — HYDROMORPHONE HYDROCHLORIDE 0.5 MG: 1 INJECTION, SOLUTION INTRAMUSCULAR; INTRAVENOUS; SUBCUTANEOUS at 13:59

## 2022-09-28 RX ADMIN — HEPARIN SODIUM 4000 UNITS: 1000 INJECTION INTRAVENOUS; SUBCUTANEOUS at 08:51

## 2022-09-28 RX ADMIN — SODIUM CHLORIDE, POTASSIUM CHLORIDE, SODIUM LACTATE AND CALCIUM CHLORIDE 500 ML: 600; 310; 30; 20 INJECTION, SOLUTION INTRAVENOUS at 08:13

## 2022-09-28 RX ADMIN — FENTANYL CITRATE 25 MCG: 50 INJECTION, SOLUTION INTRAMUSCULAR; INTRAVENOUS at 18:44

## 2022-09-28 RX ADMIN — PROCHLORPERAZINE EDISYLATE 10 MG: 5 INJECTION INTRAMUSCULAR; INTRAVENOUS at 01:19

## 2022-09-28 RX ADMIN — HYDROMORPHONE HYDROCHLORIDE 0.5 MG: 1 INJECTION, SOLUTION INTRAMUSCULAR; INTRAVENOUS; SUBCUTANEOUS at 23:13

## 2022-09-28 RX ADMIN — ONDANSETRON 4 MG: 2 INJECTION INTRAMUSCULAR; INTRAVENOUS at 14:07

## 2022-09-28 RX ADMIN — FENTANYL CITRATE 25 MCG: 50 INJECTION, SOLUTION INTRAMUSCULAR; INTRAVENOUS at 07:36

## 2022-09-28 RX ADMIN — SODIUM CHLORIDE, PRESERVATIVE FREE 40 MG: 5 INJECTION INTRAVENOUS at 09:46

## 2022-09-28 RX ADMIN — METOPROLOL TARTRATE 10 MG: 5 INJECTION INTRAVENOUS at 06:43

## 2022-09-28 RX ADMIN — SODIUM CHLORIDE, POTASSIUM CHLORIDE, SODIUM LACTATE AND CALCIUM CHLORIDE: 600; 310; 30; 20 INJECTION, SOLUTION INTRAVENOUS at 21:56

## 2022-09-28 RX ADMIN — METOPROLOL TARTRATE 5 MG: 5 INJECTION INTRAVENOUS at 06:41

## 2022-09-28 RX ADMIN — SODIUM CHLORIDE, POTASSIUM CHLORIDE, SODIUM LACTATE AND CALCIUM CHLORIDE 1000 ML: 600; 310; 30; 20 INJECTION, SOLUTION INTRAVENOUS at 09:29

## 2022-09-28 ASSESSMENT — ENCOUNTER SYMPTOMS
VOMITING: 1
ABDOMINAL PAIN: 1
SHORTNESS OF BREATH: 0
NAUSEA: 1
COUGH: 0

## 2022-09-28 ASSESSMENT — PAIN DESCRIPTION - ORIENTATION: ORIENTATION: MID

## 2022-09-28 ASSESSMENT — PAIN - FUNCTIONAL ASSESSMENT
PAIN_FUNCTIONAL_ASSESSMENT: ACTIVITIES ARE NOT PREVENTED
PAIN_FUNCTIONAL_ASSESSMENT: 0-10

## 2022-09-28 ASSESSMENT — PAIN SCALES - GENERAL
PAINLEVEL_OUTOF10: 10
PAINLEVEL_OUTOF10: 8
PAINLEVEL_OUTOF10: 6
PAINLEVEL_OUTOF10: 8
PAINLEVEL_OUTOF10: 5
PAINLEVEL_OUTOF10: 7
PAINLEVEL_OUTOF10: 8
PAINLEVEL_OUTOF10: 9
PAINLEVEL_OUTOF10: 9
PAINLEVEL_OUTOF10: 8
PAINLEVEL_OUTOF10: 7
PAINLEVEL_OUTOF10: 5
PAINLEVEL_OUTOF10: 7

## 2022-09-28 ASSESSMENT — PAIN DESCRIPTION - DESCRIPTORS
DESCRIPTORS: SPASM;SHARP
DESCRIPTORS: ACHING
DESCRIPTORS: ACHING

## 2022-09-28 ASSESSMENT — PAIN DESCRIPTION - LOCATION
LOCATION: ABDOMEN

## 2022-09-28 NOTE — ED NOTES
The following labs were labeled with appropriate pt sticker and tubed to lab:     [x] Blue     [x] Lavender   [] on ice  [x] Green/yellow  [x] Green/black [x] on ice  [] Elverna Meth  [] on ice  [] Yellow  [] Red  [] Pink  [] ABG  [] VBG    [] COVID-19 swab    [] Rapid  [] PCR  [] Flu swab  [] Peds Viral Panel     [] Urine Sample  [] Pelvic Cultures  [] Blood Cultures  [] X 2  [] STREP Cultures         Wei Rai RN  09/28/22 7308

## 2022-09-28 NOTE — ED PROVIDER NOTES
[facility] ED  Emergency Department Encounter  Emergency Medicine Resident     Pt Name: Mike Teran  MRN: [id number]  [place] 2000  Date of evaluation: 9/28/22  PCP:  [facility]AP[facility]    CHIEF COMPLAINT       Chief Complaint   Patient presents with    Abdominal Pain       HISTORY OFPRESENT ILLNESS  (Location/Symptom, Timing/Onset, Context/Setting, Quality, Duration, Modifying Félix Churchman.)      Mike Teran is a 25 y.o. male with past medical history of polysubstance abuse, pancreatitis who presents as a transfer from [facility] due to pancreatitis with concerns for gallstone pancreatitis versus cholangitis. Patient initially presented for multiple days of epigastric abdominal pain. Patient states he has been unable to tolerate p.o. for approximately 1 day. Currently Dors is pain 10/10. Laboratory work at outlying facility was remarkable for lipase of greater than 3000 as well as bilirubin of 8.5. CT scan demonstrated acute pancreatitis with no signs of pseudocyst or superimposed infection or pancreatic necrosis. Patient did receive IV Dilaudid as well as 12 mg of Zofran and 10 mg of Compazine. Patient was given IV meropenem and transferred to [address] [place] [address] for admission. PAST MEDICAL / SURGICAL / SOCIAL / FAMILY HISTORY      has a past medical history of Drug abuse ([address] Gallstone, and Pancreatitis. has a past surgical history that includes Tympanostomy tube placement.     Social History     Socioeconomic History    Marital status: Single     Spouse name: Not on file    Number of children: Not on file    Years of education: Not on file    Highest education level: Not on file   Occupational History    Not on file   Tobacco Use    Smoking status: Every Day     Packs/day: 1.00     Years: 4.00     Pack years: 4.00     Types: Cigarettes     Start date: 2017    Smokeless tobacco: Never   Vaping Use    Vaping Use: Former Substance and Sexual Activity    Alcohol use: Not Currently     Comment: last drank a week ago. Drug use: Yes     Types: Marijuana (Weed), Methamphetamines (Crystal Meth)     Comment: smoked 2 days ago. Sexual activity: Not on file   Other Topics Concern    Not on file   Social History Narrative    Not on file     Social Determinants of Health     Financial Resource Strain: Not on file   Food Insecurity: Not on file   Transportation Needs: Not on file   Physical Activity: Not on file   Stress: Not on file   Social Connections: Not on file   Intimate Partner Violence: Not on file   Housing Stability: Not on file       History reviewed. No pertinent family history. Allergies:  Augmentin [amoxicillin-pot clavulanate]    Home Medications:  Prior to Admission medications    Medication Sig Start Date End Date Taking? Authorizing Provider   ibuprofen (ADVIL;MOTRIN) 200 MG tablet Take 1,000 mg by mouth every 6 hours as needed for Pain    Historical Provider, MD       REVIEW OF SYSTEMS    (2-9 systems for level 4, 10 or more for level 5)      Review of Systems   Constitutional:  Negative for fever. HENT:  Negative for congestion. Respiratory:  Negative for cough and shortness of breath. Cardiovascular:  Negative for chest pain. Gastrointestinal:  Positive for abdominal pain, nausea and vomiting. Genitourinary:  Negative for dysuria. Musculoskeletal:  Negative for myalgias. Skin:  Negative for rash. Neurological:  Negative for headaches. Psychiatric/Behavioral:  Negative for confusion. PHYSICAL EXAM   (up to 7 for level 4, 8 or more for level 5)     INITIAL VITALS:    oral temperature is 98.3 °F (36.8 °C). His blood pressure is 151/98 (abnormal) and his pulse is 67. His respiration is 17 and oxygen saturation is 100%. Physical Exam  Constitutional:       Comments: Patient is sleepy however easily awakens and answers questions appropriately.   He is ill in appearance, pale   Eyes: General: No scleral icterus. Cardiovascular:      Rate and Rhythm: Normal rate and regular rhythm. Pulmonary:      Effort: Pulmonary effort is normal. No respiratory distress. Breath sounds: Normal breath sounds. Abdominal:      Comments: Moderately distended, tender palpation epigastric region, involuntary guarding, nonperitoneal abdomen. Skin:     Capillary Refill: Capillary refill takes less than 2 seconds. Coloration: Skin is jaundiced. Neurological:      General: No focal deficit present. Mental Status: He is oriented to person, place, and time. Motor: No weakness.    Psychiatric:         Mood and Affect: Mood normal.         Behavior: Behavior normal.       DIFFERENTIAL  DIAGNOSIS     PLAN (LABS / IMAGING / EKG):  Orders Placed This Encounter   Procedures    CBC with Auto Differential    Comprehensive Metabolic Panel    Lactate, Sepsis    Lipase    Comprehensive Metabolic Panel w/ Reflex to MG    Triglyceride    Lipase    Calcium, Ionized    Magnesium    Phosphorus    Protime-INR    Diet NPO Exceptions are: Ice Chips    Vital signs per unit routine    Notify physician    Up with assistance    Daily weights    Intake and output    Place intermittent pneumatic compression device    Full Code    Inpatient consult to Hospitalist    OT eval and treat    PT evaluation and treat    Initiate Oxygen Therapy Protocol    Pulse Oximetry Spot Check    ADMIT TO INPATIENT    ADMIT TO INPATIENT       MEDICATIONS ORDERED:  Orders Placed This Encounter   Medications    lactated ringers bolus    FOLLOWED BY Linked Order Group     lactated ringers infusion     lactated ringers infusion    sodium chloride flush 0.9 % injection 5-40 mL    sodium chloride flush 0.9 % injection 5-40 mL    0.9 % sodium chloride infusion    potassium chloride 10 mEq/100 mL IVPB (Peripheral Line)    magnesium sulfate 1000 mg in dextrose 5% 100 mL IVPB    OR Linked Order Group     HYDROmorphone (DILAUDID) injection 0.25 mg HYDROmorphone (DILAUDID) injection 0.5 mg    OR Linked Order Group     ondansetron (ZOFRAN-ODT) disintegrating tablet 4 mg     ondansetron (ZOFRAN) injection 4 mg    enoxaparin (LOVENOX) injection 40 mg     Order Specific Question:   Indication of Use     Answer:   Prophylaxis-DVT/PE       DDX: Pancreatitis, gallstone pancreatitis, cholangitis, choledocholithiasis    Initial MDM/Plan: 25 y.o. male who presents as a transfer from [facility] due to acute pancreatitis. Lipase greater than 3000. Received IV analgesia, antiemetics and meropenem. Seen and examined. He is sleepy but arousable to voice, does follow commands, alert and oriented person, place, time. He is ill in appearance but none toxic, no acute distress. Vital signs are unremarkable, nontachycardic, afebrile and saturating well on room air. Examination remarkable for jaundice skin as well as moderately distended abdomen with tenderness to palpation in the epigastric region. No rebound or guarding. We will repeat basic labs, lipase, give fluid bolus, consult [facility] for admission    DIAGNOSTIC RESULTS / EMERGENCY DEPARTMENT COURSE / MDM     LABS:  Labs Reviewed   CBC WITH AUTO DIFFERENTIAL - Abnormal; Notable for the following components:       Result Value    Hemoglobin 17.4 (*)     Hematocrit 50.6 (*)     Seg Neutrophils 90 (*)     Lymphocytes 6 (*)     Eosinophils % 0 (*)     Segs Absolute 11.91 (*)     Absolute Lymph # 0.82 (*)     All other components within normal limits   COMPREHENSIVE METABOLIC PANEL - Abnormal; Notable for the following components:    Glucose 130 (*)     Alkaline Phosphatase 149 (*)      (*)      (*)     Total Bilirubin 6.7 (*)     All other components within normal limits   LACTATE, SEPSIS   LIPASE         RADIOLOGY:  CT ABDOMEN PELVIS W IV CONTRAST Additional Contrast? None    Result Date: 9/27/2022  EXAMINATION: CT ABDOMEN PELVIS W IV CONTRAST, 9/27/2022 10:57 PM EDT HISTORY: Abdominal pain.  History of pancreatitis. COMPARISON: CT abdomen pelvis, 8/13/2022. TECHNIQUE: CT scan of the abdomen and pelvis was performed using 75 mL of Isovue-370 IV contrast. Sagittal and coronal reconstructions are provided. CT dose reduction technique was used, including Automated Exposure Control. FINDINGS: CT ABDOMEN: The lung bases and pleural spaces are clear. Cardiac size is normal. There is no pericardial effusion. Severe peripancreatic fluid and fat stranding are noted consistent with prominent acute pancreatitis, not significantly changed from last month's exam. No complicating features are seen. There is mild gallbladder distention without cholelithiasis or cholecystitis. The common bile duct is dilated at 8 mm in the pancreatic head on image 35. No intrinsic calcified stone is seen. The liver, spleen, adrenal glands, kidneys, aorta, IVC, stomach and small bowel appear unremarkable. CT PELVIS: A normal appendix is seen on image 90 of series 2. The pelvic small bowel loops, prostate and urinary bladder are unremarkable. There is a normal volume of stool and gas in the colon. No loculated fluid or free air is seen. No acute osseous abnormality or suspicious bony lesion is identified. 1. Severe uncomplicated acute pancreatitis, not significantly changed from last month's exam. 2. New mild gallbladder distention without cholelithiasis, gallbladder wall thickening or pericholecystic fluid. This may reflect a prolonged interval since the patient's last meal. However, if there is clinical concern for cholecystitis, further evaluation with a nuclear HIDA scan could be considered. 3. New mild extrahepatic biliary ductal dilatation. If clinical or laboratory findings suggest biliary obstruction, further evaluation with MRCP or ERCP could be considered. EMERGENCY DEPARTMENT COURSE:     Lipase remained greater than 3000.   Slight leukocytosis 13,000 most likely stress reaction from acute pancreatitis patient does not have any other SIRS vital signs no indication for full sepsis work-up. Lactic within normal limits. Did receive IV meropenem at outlying facility. [facility] consulted, agreed to admit the patient. PROCEDURES:  None    CONSULTS:  IP CONSULT TO HOSPITALIST    CRITICAL CARE:  Please see attending note    FINAL IMPRESSION      1. Acute pancreatitis, unspecified complication status, unspecified pancreatitis type          DISPOSITION / PLAN     DISPOSITION Admitted 09/28/2022 03:31:41 AM        PATIENTREFERRED TO:  No follow-up provider specified.     DISCHARGE MEDICATIONS:  New Prescriptions    No medications on file       Iam Jenkins DO  EmergencyMedicine Resident    (Please note that portions of this note were completed with a voice recognition program.  Efforts were made to edit the dictations but occasionally words are mis-transcribed.)       Iam Jenkins DO  Resident  09/28/22 9938

## 2022-09-28 NOTE — CONSULTS
[facility]   Consult Note         Today's Date: 9/28/2022  Patient Name: Shaila Ruiz  Date of admission: 9/28/2022  2:58 AM  Patient's age: 25 y.o., 2000  Admission Dx: Acute pancreatitis without infection or necrosis, unspecified pancreatitis type [K85.90]  Acute pancreatitis, unspecified complication status, unspecified pancreatitis type [K85.90]    Reason for Consult:  Cardiac evaluation    Requesting Physician: Ld Murphy DO    REASON FOR CONSULT: New onset atrial fibrillation/atrial flutter    History Obtained From:  Patient, chart, staff, records    HISTORY OF PRESENT ILLNESS:    Patient, 21 supportive, is a transfer from [facility] due to abdominal pain and CT scan improvement acute pancreatitis. Patient had an episode of EKG proven atrial fibrillation. Patient denies any chest pain, shortness of breath, dyspnea, lightheadedness and headaches. Patient admits to drinking alcohol and use of methamphetamine. Patient states that his this is the first time he is having an acute arrhythmia. Patient is currently on amiodarone infusion, diltiazem infusion in both continuous, along with continuous heparin. His chest x-ray is negative for any acute pathology, his TSH is 0.74 and his ethanol blood levels were less than 10. His EKG was done initially which showed atrial fibrillation and later EKG showed normal sinus rhythm. Past Medical History:   has a past medical history of Drug abuse ([address] Gallstone, and Pancreatitis. Past Surgical History:   has a past surgical history that includes Tympanostomy tube placement. Home Medications:    Prior to Admission medications    Medication Sig Start Date End Date Taking?  Authorizing Provider   ibuprofen (ADVIL;MOTRIN) 200 MG tablet Take 1,000 mg by mouth every 6 hours as needed for Pain    Historical Provider, MD       sodium chloride flush, 5-40 mL, IntraVENous, 2 times per day    aspirin, 324 mg, Oral, Daily    pantoprazole (PROTONIX) 40 mg injection, 40 mg, IntraVENous, Once    heparin (porcine), 4,000 Units, IntraVENous, Once    lactated ringers bolus, 500 mL, IntraVENous, Once    magnesium sulfate, 2,000 mg, IntraVENous, Once      Allergies:  Augmentin [amoxicillin-pot clavulanate]    Social History:   reports that he has been smoking cigarettes. He started smoking about 5 years ago. He has a 4.00 pack-year smoking history. He has never used smokeless tobacco. He reports that he does not currently use alcohol. He reports current drug use. Drugs: Marijuana (Weed) and Methamphetamines (Crystal Meth). Family History: family history is not on file. No h/o sudden cardiac death. REVIEW OF SYSTEMS:    Constitutional: there has been no unanticipated weight loss. There's been No change in energy level, No change in activity level. Eyes: No visual changes or diplopia. No scleral icterus. ENT: No Headaches, hearing loss or vertigo. No mouth sores or sore throat. Cardiovascular: per HPI  Respiratory: per HPI  Gastrointestinal: No abdominal pain, appetite loss, blood in stools. No change in bowel or bladder habits. Genitourinary: No dysuria, trouble voiding, or hematuria. Musculoskeletal:  No gait disturbance, No weakness or joint complaints. Integumentary: No rash or pruritis. Neurological: No headache, diplopia, change in muscle strength, numbness or tingling. No change in gait, balance, coordination, mood, affect, memory, mentation, behavior. Psychiatric: No anxiety, or depression. Endocrine: No temperature intolerance. No excessive thirst, fluid intake, or urination. No tremor. Hematologic/Lymphatic: No abnormal bruising or bleeding, blood clots or swollen lymph nodes. Allergic/Immunologic: No nasal congestion or hives.       PHYSICAL EXAM:      BP (!) 128/108   Pulse (!) 112   Temp 98.3 °F (36.8 °C) (Oral)   Resp 19   SpO2 97%    Constitutional and General Appearance: alert, cooperative, no distress and appears stated age  [de-identified]: PERRL, no cervical lymphadenopathy. No masses palpable. Normal oral mucosa  Respiratory:  Normal excursion and expansion without use of accessory muscles  Resp Auscultation: Good respiratory effort. No for increased work of breathing. On auscultation: clear to auscultation bilaterally  Cardiovascular: The apical impulse is not displaced  Heart tones are crisp and normal. regular S1 and S2.  Jugular venous pulsation Normal  The carotid upstroke is normal in amplitude and contour without delay or bruit  Peripheral pulses are symmetrical and full   Abdomen:   No masses or tenderness  Bowel sounds present  Extremities:   No Cyanosis or Clubbing   Lower extremity edema: No   Skin: Warm and dry  Neurological:  Alert and oriented. Moves all extremities well  No abnormalities of mood, affect, memory, mentation, or behavior are noted        EKG:    Date: 09/28/22  Reading: No acute ischemia  Atrial fibrillation, later showed normal sinus rhythm  No ST or T wave changes      LAST ECHO:  Date:  Findings Summary:      LAST Stress Test:   Date of last ST:  Major Findings:    LAST Cardiac Angiography:.  Date:  Findings:      Labs:     CBC:   Recent Labs     09/28/22 0321 09/28/22  0657   WBC 13.4 11.6   HGB 17.4* 16.0   HCT 50.6* 47.5    224     BMP:   Recent Labs     09/27/22 2226 09/28/22  0321    138   K 3.8 4.2   CO2 19* 21   BUN 21* 20   CREATININE 0.55* 0.55*   LABGLOM Pediatric GFR requires additional information. Refer to [facility] website for calculator. Pediatric GFR requires additional information. Refer to [facility] website for calculator.    GLUCOSE 120* 130*     APTT:  Recent Labs     09/28/22  0657   APTT 22.8     FASTING LIPID PANEL:  Lab Results   Component Value Date/Time    TRIG 81 08/14/2022 02:38 PM     LIVER PROFILE:  Recent Labs     09/27/22 2226 09/28/22  0321   * 151*   * 397*   LABALBU 4.6 4.8     Troponins: Invalid input(s): TROPONIN Other Current Problems  Patient Active Problem List   Diagnosis    Acute pancreatitis, unspecified complication status, unspecified pancreatitis type    Alcohol induced acute pancreatitis without necrosis or infection    Gallstone pancreatitis    Pain of upper abdomen    Elevated LFTs    Acute pancreatitis without infection or necrosis, unspecified pancreatitis type           IMPRESSION:  New onset A. Fib likely Alcohol and Amphetamine induced. Acute pancreatitis  Alcohol abuse  Illicit drug abuse -methamphetamine  Abdominal pain  Elevated LFTs      Recommendations:    Continue amiodarone, diltiazem and heparin  [id number] score is 0  Keep potassium above 4 and magnesium above 2  Rest management per primary team  Follow-up with echo results  Will consider Cardioversion if remained in A.fib. Discussed with patient, family, and Nurse. Electronically signed by Sachin Roberto MD on 9/28/2022 at [address] Jansen MD  Internal Medicine Resident, PGY-1  [address] [facility], [place] [address] AM       Attending Physician Statement  I have discussed the case of Mirta Adams including pertinent history and exam findings with the resident. I have seen and examined the patient and the key elements of the encounter have been performed by me. I agree with the assessment, plan and orders as documented by the resident With changes made to the note.      Electronically signed by Dali Vidal MD on 9/28/2022 at 2:59 PM.    [facility]      [phone]

## 2022-09-28 NOTE — FLOWSHEET NOTE
Cardiologist resident at bedside. Rapid response was called on patient at 659am. Life los and full monitor attached. Patient arouses to name and is alert and oriented x 4.

## 2022-09-28 NOTE — ED NOTES
RN spoke to Dr. Demetrio Centeno to inform him that pt is unable to go to MRI at this time due to patient requiring multiple drips to keep his heart rate down and patient more stable. Dr. Demetrio Centeno aware and comfortable with waiting until morning to reevaluate patients condition.      Sisi Trejo RN  09/28/22 1361  MRI states they can only have three things infusing and needs one of the drips disconnected for IV contrast. Pt is in need of all drips infusing at this time, no interruptions     Sisi Trejo RN  09/28/22 0287

## 2022-09-28 NOTE — ED PROVIDER NOTES
[facility]  [address] [facility] [address]  Phone: [facility], [place]    Chief Complaint   Patient presents with    Abdominal Pain     Mid abdominal pain for 4 days. Has appointment with GI on the 3rd and surgical consult on the 13th. HPI    Nadine Nelson is a 25 y.o. male who presents above-noted complaint. She has been doing okay. He has had intermittent episodes of abdominal pain pancreatitis and gallstone problems for over a year. Been issues in regards to having removal versus surgical intervention.'s been transferred to [facility] in the past.  Presents with abdominal pain discomfort right upper quadrant upper abdominal area. Not been eating or drinking. Does admit to methamphetamines according to the staff he used a couple days ago. PAST MEDICAL HISTORY    Past Medical History:   Diagnosis Date    Drug abuse ([facility] [address]     Gallstone     Pancreatitis        SURGICAL HISTORY    Past Surgical History:   Procedure Laterality Date    TYMPANOSTOMY TUBE PLACEMENT         CURRENT MEDICATIONS    Current Outpatient Rx   Medication Sig Dispense Refill    ibuprofen (ADVIL;MOTRIN) 200 MG tablet Take 1,000 mg by mouth every 6 hours as needed for Pain         ALLERGIES    Allergies   Allergen Reactions    Augmentin [Amoxicillin-Pot Clavulanate]        FAMILY HISTORY    History reviewed. No pertinent family history. SOCIAL HISTORY    Social History     Socioeconomic History    Marital status: Single     Spouse name: None    Number of children: None    Years of education: None    Highest education level: None   Tobacco Use    Smoking status: Every Day     Packs/day: 1.00     Years: 4.00     Pack years: 4.00     Types: Cigarettes     Start date: 2017    Smokeless tobacco: Never   Vaping Use    Vaping Use: Former   Substance and Sexual Activity    Alcohol use: Not Currently     Comment: last drank a week ago.     Drug use: Yes     Types: Marijuana (Paula Sermon), Methamphetamines (Crystal Meth)     Comment: smoked 2 days ago. REVIEW OF SYSTEMS    Positive for nausea vomiting abdominal pain. No reported fever or chest pain. All systems negative except as marked. PHYSICAL EXAM    VITAL SIGNS: BP (!) 141/93   Pulse 82   Temp 97.9 °F (36.6 °C) (Oral)   Resp 16   Wt 190 lb (86.2 kg)   SpO2 98%   BMI 24.39 kg/m²    Constitutional:  Alert not toxic or ill, diaphoretic upper abdominal pain  HENT:  normocephalic, Atraumatic, no jaundice bilateral external ears normal, Oropharynx moist, No oral exudates, Nose normal.  Cervical Spine: Normal range of motion,  No stridor. No tenderness, Supple,  Eyes:  No discharge or  Swelling,Conjunctiva normal, PERRL, EOMI,  Respiratory: No respiratory distress, Normal breath sounds,  No wheezing, No chest tenderness. Cardiovascular:  Normal heart rate, Normal rhythm, No murmurs, No rubs, No gallops. GI:  No reproducible pain, Bowel sounds normal, Soft, No masses, No pulsatile masses. No peritonitis tenderness  Musculoskeletal:  Intact distal pulses, No edema, No tenderness, No cyanosis, No clubbing. Good range of motion in all major joints. No tenderness to palpation or major deformities noted. Back:No tenderness. Integument:  Warm, Dry, No erythema, No rash (on exposed areas)   Lymphatic:  No lymphadenopathy noted. Neurologic:  Alert & oriented x 3, Normal motor function, Normal sensory function, No focal deficits noted. Psychiatric:  Affect normal, Judgment normal, Mood normal.                          RADIOLOGY    CT ABDOMEN PELVIS W IV CONTRAST Additional Contrast? None   Final Result      1. Severe uncomplicated acute pancreatitis, not significantly changed from last    month's exam.   2. New mild gallbladder distention without cholelithiasis, gallbladder wall    thickening or pericholecystic fluid.  This may reflect a prolonged interval    since the patient's last meal. However, if there is clinical concern for    cholecystitis, further evaluation with a nuclear HIDA scan could be considered. 3. New mild extrahepatic biliary ductal dilatation. If clinical or laboratory    findings suggest biliary obstruction, further evaluation with MRCP or ERCP    could be considered. PROCEDURES    none        CONSULTS:  Transfer line at [facility]. 49:  None  SCREENINGS:  BP (!) 141/93   Pulse 82   Temp 97.9 °F (36.6 °C) (Oral)   Resp 16   Wt 190 lb (86.2 kg)   SpO2 98%   BMI 24.39 kg/m²     Screening For Hypertension and Follow-up (#317)  patient informed that blood pressure is abnormal and in the pre-hypertension range and should be rechecked by primary care      Screening For Tobacco Use and Cessation Intervention (#226):   reports that he has been smoking cigarettes. He started smoking about 5 years ago. He has a 4.00 pack-year smoking history. He has never used smokeless tobacco.  Tobacco cessation encouraged with brief counseling. Written home care instructions for smoking cessation provided. ED COURSE & MEDICAL DECISION MAKING    Pertinent Labs & Imaging studies reviewed. (See chart for details)  Acute exacerbation of recurrent abdominal pain. History of gallstones. History of pancreatitis in the past.  Looks uncomfortable here afebrile though has some diaphoresis. Admits to drug use recently as well. Could even have a withdrawal component given some of his diaphoresis. He is not jaundiced. REASSESSMENT  11:18 PM  Patient rechecked and updated on lab/xray status, progress and results. Patient was reassessed and condition was worsened after treatment. .. Needs further monitoring at this time. Labs show normal white count but severely elevated bilirubin over 8. ALT and AST are elevated relative to 4 days ago.   White count is normal however obviously concern for cholecystitis, choledocholithiasis, gallstone pancreatitis or cholangitis. Giving him a dose of IV antibiotics. Less Augmentin as an allergy although low severity. He is had no relief from Toradol and Zofran. Giving a dose of Dilaudid and Zofran. He will need admission and monitoring. He was at [facility] recently. Feel he would benefit from GI evaluation, surgical evaluation. Looking at his chart he has had gallbladder issues for almost over a year if not longer. 11:28 PM EDT  CAT scan reveals severe acute pancreatitis. All of some extrahepatic dilatation. This would correlate with his bilirubin of 8. Giving him IV antibiotics although his white count is unremarkable. Gallbladder is distended without obvious gallstones. He has a history of gallstone pancreatitis in the past.  In discussion with patient and family obviously very concerned with repetitive episodes or gallbladder issues pancreatitis issues further surgical interventions will be per admission team at [facility]. Lipase is reported by lab to be too elevated to assess and needs diluted. Lipase eyes greater than 14,000    11:52 PM EDT  I spoke with hospitalist transfer team.  They will be happy to admit on behalf of Dr. Kiera Araya    1. Acute biliary pancreatitis, unspecified complication status    2. Hyperbilirubinemia    3. Intractable pain    4. History of gallstones         PATIENT REFERRED TO:  No follow-up provider specified.     DISCHARGE MEDICATIONS:  New Prescriptions    No medications on file           Jerri Paul MD  09/27/22 9707

## 2022-09-28 NOTE — ED NOTES
The following labs were labeled with appropriate pt sticker and tubed to lab:     [x] Blue     [] Lavender   [] on ice  [] Green/yellow  [] Green/black [] on ice  [] Willma Economy  [] on ice  [] Yellow  [] Red  [] Pink  [] ABG  [] VBG    [] COVID-19 swab    [] Rapid  [] PCR  [] Flu swab  [] Peds Viral Panel     [] Urine Sample  [] Pelvic Cultures  [] Blood Cultures  [] X 2  [] STREP Cultures         Jc Nguyen RN  09/28/22 6010

## 2022-09-28 NOTE — ED PROVIDER NOTES
[address] [facility]     Emergency Department     Faculty Attestation    I performed a history and physical examination of the patient and discussed management with the resident. I have reviewed and agree with the residents findings including all diagnostic interpretations, and treatment plans as written. Any areas of disagreement are noted on the chart. I was personally present for the key portions of any procedures. I have documented in the chart those procedures where I was not present during the key portions. I have reviewed the emergency nurses triage note. I agree with the chief complaint, past medical history, past surgical history, allergies, medications, social and family history as documented unless otherwise noted below. Documentation of the HPI, Physical Exam and Medical Decision Making performed by lucioibmannie is based on my personal performance of the HPI, PE and MDM. For Physician Assistant/ Nurse Practitioner cases/documentation I have personally evaluated this patient and have completed at least one if not all key elements of the E/M (history, physical exam, and MDM). Additional findings are as noted. 26 yo M transferred from [facility], pt had acute biliary pancreatitis, admitted by inter [facility], c/o r abdominal pain,   No cp, no fever, no injury  Pe vss somnolent, pt arouses with voice, neck supple, tender R abdomen, + guarding, no rebound, no distension,   No calf swelling no calf tenderness    -admitting to inter [facility]    EKG Interpretation    Interpreted by me      CRITICAL CARE: There was a high probability of clinically significant/life threatening deterioration in this patient's condition which required my urgent intervention. Total critical care time was 5 minutes. This excludes any time for separately reportable procedures.        [address] [facility],   09/28/22 4246

## 2022-09-28 NOTE — CARE COORDINATION
09/28/22 5165   Service Assessment   Patient Orientation Alert and Oriented  (sleepy)   Cognition Alert   History Provided By Patient   Primary Caregiver Self   Support Systems Parent   PCP Verified by CM Yes   Last Visit to PCP Within last 3 months   Prior Functional Level Independent in ADLs/IADLs   Current Functional Level Independent in ADLs/IADLs   Can patient return to prior living arrangement Yes   Ability to make needs known: Good   Social/Functional History   Lives With Parent   Type of [address] [place] [address] One level   ADL Assistance Independent   Homemaking Assistance Independent   Homemaking Responsibilities Yes   Ambulation Assistance Independent   Transfer Assistance Independent   Active  Yes   Discharge Planning   Type of Res[facility]iler[facility]   Current Services Prior To Admission None   Potential Assistance Needed N/A   Patient expects to be discharged to: Trailer/mobile home   Condition of Participation: Discharge Planning   The Plan for Transition of Care is related to the following treatment goals: increased comfort level   Home with mother

## 2022-09-28 NOTE — H&P
[facility]  Office: [address], DO, Jada Males, DO, Gilbert Loop, DO, Julia Lemon Blood, DO, Cy Thomas MD, Lee Ann Roy MD, Darryl Way MD, Sharon Bocanegra MD,  Luz Del Rio MD, Nadege Donato MD, Montserrat Chirinos, DO, Shira Quispe MD,  Evelin Sharma MD, Jay Quintana MD, Claudean Budds, DO, Zeferino Guillermo MD, Denis Vega MD, Stella Tineo MD, Tyrone Uribe MD, Loni El MD, Li Schulz MD, Jazmine Sorto, DO, Dwight Hobson MD, David Arce MD, Lynn Champion, CNP,  Evelina Parekh, CNP, Sarah Ng, CNP, Arabella Andres, CNP,  Migue Ontiveros, [facility], Jayla Hager, CNP, Fernand Cockayne, CNP, Maribeth Reveles, CNP, Elroy Mckeon, CNP, Jose Carcamo, CNP, Juany Mcnally PA-C, Silvia Norton, CNS, Alin Burrell, [facility], Edd Foreman, CNP, Maliha Lux, CNP, Nate Prasad, [facility]    HISTORY AND PHYSICAL EXAMINATION            Date:   9/28/2022  Patient name:  Lynette Barry  Date of admission:  9/28/2022  2:58 AM  MRN:   [id number]  Account:  [de-identified]  YOB: 2000  PCP:    CHAPO Veras  Room:   18/18  Code Status:    Full Code    Chief Complaint:   InterIntermittent RUQ and epigastric pain . -new onset a fib at 7am on 9/28/22    History of Present Illness:     Lynette Barry is a 25 y.[facility]. [facility] male with  hx of pancreatitis and cholecystitis, Drug abuse including methamphetamine a few days before presentation who has been having ~ one year of  intermittent episodes of abdominal pain pancreatitis and gallstone problems for over a year who presents with significant epigastric and RUQ  pain for 4 days. Patient reports inabilityh to take in any PO during this time. Does admit to methamphetamines according to the staff he used a couple days ago.  CT with contrast on admission showed Severe uncomplicated acute pancreatitis, not significantly changed from last   month's exam.New mild gallbladder distention without cholelithiasis, gallbladder wall  thickening or pericholecystic fluid, New mild extrahepatic biliary ductal dilatation. Labs showed lipase 1400, elevated lFTs and biliribin . [facility] was [facility] symptomaticly and given IV meropenem and transferred to [facility] for admission. Past Medical History:     Past Medical History:   Diagnosis Date    Drug abuse ([address]     Gallstone     Pancreatitis         Past Surgical History:     Past Surgical History:   Procedure Laterality Date    TYMPANOSTOMY TUBE PLACEMENT          Medications Prior to Admission:     Prior to Admission medications    Medication Sig Start Date End Date Taking? Authorizing Provider   ibuprofen (ADVIL;MOTRIN) 200 MG tablet Take 1,000 mg by mouth every 6 hours as needed for Pain    Historical Provider, MD        Allergies:     Augmentin [amoxicillin-pot clavulanate]    Social History:     Tobacco:    reports that he has been smoking cigarettes. He started smoking about 5 years ago. He has a 4.00 pack-year smoking history. He has never used smokeless tobacco.  Alcohol:      reports that he does not currently use alcohol. Drug Use:  reports current drug use. Drugs: Marijuana (Weed) and Methamphetamines (Crystal Meth). Family History:     History reviewed. No pertinent family history. Review of Systems:     Positive and Negative as described in HPI. ROS unremarakble other then above noted     Physical Exam:   BP (!) 147/93   Pulse 73   Temp 98.3 °F (36.8 °C) (Oral)   Resp 21   SpO2 95%   Temp (24hrs), Av.1 °F (36.7 °C), Min:97.9 °F (36.6 °C), Max:98.3 °F (36.8 °C)    No results for input(s): POCGLU in the last 72 hours. Intake/Output Summary (Last 24 hours) at 2022 0690  Last data filed at 2022 0423  Gross per 24 hour   Intake 1000 ml   Output --   Net 1000 ml       General Appearance:  Ao3, tired, hasnt slept in a day. Still dry appearing. Resting but intermittently has abdominal pain that causes him to jump up off the bed. Mental status: oriented to person, place, and time  Head: normocephalic, atraumatic  Eye: no icterus, redness, pupils equal and reactive, extraocular eye movements intact, conjunctiva clear  Ear: normal external ear, no discharge, hearing intact  Nose: no drainage noted  Mouth: mucous membranes moist  Neck: supple, no carotid bruits, thyroid not palpable  Lungs: Bilateral equal air entry, clear to ausculation, no wheezing, rales or rhonchi, normal effort  Cardiovascular: Irregular irreegular, unable to appreciate any murmers ,, limbs nomorthermic and nonedematous   Abdomen: Soft, tender, nondistended, normal bowel sounds, no  acute abdomen. No fluid shifts. No skin changes.    Neurologic: There are no new focal motor or sensory deficits, normal muscle tone and bulk, no abnormal sensation, normal speech, cranial nerves II through XII grossly intact  Skin: No gross lesions, rashes, bruising or bleeding on exposed skin area  Extremities: peripheral pulses palpable, no pedal edema or calf pain with palpation  Psych: gets startles quickly     Investigations:      Laboratory Testing:  Recent Results (from the past 24 hour(s))   CBC with Auto Differential    Collection Time: 09/27/22 10:26 PM   Result Value Ref Range    WBC 10.2 4.5 - 13.5 k/uL    RBC 5.31 4.5 - 5.9 m/uL    Hemoglobin 15.8 13.5 - 17.5 g/dL    Hematocrit 46.3 41 - 53 %    MCV 87.3 80 - 100 fL    MCH 29.8 26 - 34 pg    MCHC 34.1 31 - 37 g/dL    RDW 14.3 12.1 - 15.2 %    Platelets 476 384 - 474 k/uL    Differential Type YES     Seg Neutrophils 69 39 - 75 %    Lymphocytes 24 13 - 44 %    Monocytes 6 5 - 9 %    Eosinophils % 1 0 - 5 %    Basophils 0 0 - 2 %    Segs Absolute 7.10 (H) 2.1 - 6.5 k/uL    Absolute Lymph # 2.40 1.2 - 5.2 k/uL    Absolute Mono # 0.60 0.0 - 1.0 k/uL    Absolute Eos # 0.10 0.0 - 0.4 k/uL    Basophils Absolute 0.00 0.0 - 0.2 k/uL   Comprehensive Metabolic Panel    Collection Time: 09/27/22 10:26 PM   Result Value Ref Range    Glucose 120 (H) 70 - 99 mg/dL    BUN 21 (H) 6 - 20 mg/dL    Creatinine 0.55 (L) 0.70 - 1.20 mg/dL    Bun/Cre Ratio 38 (H) 9 - 20    Calcium 9.6 8.6 - 10.4 mg/dL    Sodium 140 135 - 144 mmol/L    Potassium 3.8 3.7 - 5.3 mmol/L    Chloride 104 98 - 107 mmol/L    CO2 19 (L) 20 - 31 mmol/L    Anion Gap 17 9 - 17 mmol/L    Alkaline Phosphatase 140 (H) 40 - 129 U/L     (H) 5 - 41 U/L     (H) <40 U/L    Total Bilirubin 8.5 (H) 0.30 - 1.20 mg/dL    Total Protein 7.0 6.4 - 8.3 g/dL    Albumin 4.6 3.5 - 5.2 g/dL    GFR Non-African American  >60 mL/min     Pediatric GFR requires additional information. Refer to [facility] website for calculator. GFR Comment         Lipase    Collection Time: 09/27/22 10:26 PM   Result Value Ref Range    Lipase >3,000 (HH) 13 - 60 U/L   Lactic Acid    Collection Time: 09/27/22 10:26 PM   Result Value Ref Range    Lactic Acid 1.5 0.5 - 2.2 mmol/L   COVID-19, Rapid    Collection Time: 09/27/22 10:32 PM    Specimen: Nasopharyngeal Swab   Result Value Ref Range    Specimen Description . NASOPHARYNGEAL SWAB     SARS-CoV-2, Rapid Not Detected Not Detected   Culture, Blood 1    Collection Time: 09/27/22 10:33 PM    Specimen: Blood   Result Value Ref Range    Specimen Description . BLOOD     Special Requests 20ML RAC     Culture NO GROWTH 7 HOURS    CBC with Auto Differential    Collection Time: 09/28/22  3:21 AM   Result Value Ref Range    WBC 13.4 4.5 - 13.5 k/uL    RBC 5.72 4.21 - 5.77 m/uL    Hemoglobin 17.4 (H) 13.0 - 17.0 g/dL    Hematocrit 50.6 (H) 40.7 - 50.3 %    MCV 88.5 82.6 - 102.9 fL    MCH 30.4 25.2 - 33.5 pg    MCHC 34.4 28.4 - 34.8 g/dL    RDW 13.2 11.8 - 14.4 %    Platelets 786 560 - 538 k/uL    MPV 10.5 8.1 - 13.5 fL    NRBC Automated 0.0 0.0 per 100 WBC    Seg Neutrophils 90 (H) 34 - 64 %    Lymphocytes 6 (L) 25 - 45 %    Monocytes 4 2 - 8 %    Eosinophils % 0 (L) 1 - 4 % Basophils 0 0 - 2 %    Immature Granulocytes 0 0 %    Segs Absolute 11.91 (H) 1.80 - 8.00 k/uL    Absolute Lymph # 0.82 (L) 1.20 - 5.20 k/uL    Absolute Mono # 0.54 0.10 - 1.40 k/uL    Absolute Eos # <0.03 0.00 - 0.44 k/uL    Basophils Absolute 0.03 0.00 - 0.20 k/uL    Absolute Immature Granulocyte 0.05 0.00 - 0.30 k/uL   Comprehensive Metabolic Panel    Collection Time: 09/28/22  3:21 AM   Result Value Ref Range    Glucose 130 (H) 70 - 99 mg/dL    BUN 20 6 - 20 mg/dL    Creatinine 0.55 (L) 0.70 - 1.20 mg/dL    Calcium 9.5 8.6 - 10.4 mg/dL    Sodium 138 135 - 144 mmol/L    Potassium 4.2 3.7 - 5.3 mmol/L    Chloride 100 98 - 107 mmol/L    CO2 21 20 - 31 mmol/L    Anion Gap 17 9 - 17 mmol/L    Alkaline Phosphatase 149 (H) 40 - 129 U/L     (H) 5 - 41 U/L     (H) <40 U/L    Total Bilirubin 6.7 (H) 0.3 - 1.2 mg/dL    Total Protein 7.3 6.4 - 8.3 g/dL    Albumin 4.8 3.5 - 5.2 g/dL    Albumin/Globulin Ratio 1.9 1.0 - 2.5    GFR Non-African American  >60 mL/min     Pediatric GFR requires additional information. Refer to [facility] website for calculator. GFR Comment         Lactate, Sepsis    Collection Time: 09/28/22  3:21 AM   Result Value Ref Range    Lactic Acid, Sepsis, Whole Blood 1.3 0.5 - 1.9 mmol/L   Lipase    Collection Time: 09/28/22  3:21 AM   Result Value Ref Range    Lipase >3,000 (H) 13 - 60 U/L       Imaging/Diagnostics:  CT ABDOMEN PELVIS W IV CONTRAST Additional Contrast? None    Result Date: 9/27/2022  1. Severe uncomplicated acute pancreatitis, not significantly changed from last month's exam. 2. New mild gallbladder distention without cholelithiasis, gallbladder wall thickening or pericholecystic fluid. This may reflect a prolonged interval since the patient's last meal. However, if there is clinical concern for cholecystitis, further evaluation with a nuclear HIDA scan could be considered. 3. New mild extrahepatic biliary ductal dilatation.  If clinical or laboratory findings suggest biliary obstruction, further evaluation with MRCP or ERCP could be considered. Assessment :      Hospital Problems             Last Modified POA    * (Principal) Acute pancreatitis without infection or necrosis, unspecified pancreatitis type 9/28/2022 Yes    Acute pancreatitis, unspecified complication status, unspecified pancreatitis type 9/28/2022 Yes     #New onset A fib  -Rapid response called upon arrival in ER. Patient was asymptomatic with -140s no P waves. EKG concerning for AV dissociation.   -He reports recent methamphaetabine and etoh use but reluctant to say when he took last.   -Denies any hx of heart disease in him or his family, reports his dad has a murmer but that's all he knows   -His BP remained stable at 140, HR fluctuating. Received metop IV push x2, IVF, analgesia, magnesium 3g, started on cardizem infusion and eventually amiodarone infuction which helped keep HR in 80s. -started on heparin infusion, denies bleeding hx. -CXR clear  PLAN  -Echo, TSH, pain control, volume assessment. Follow up on mg and LFTs. -Cardio consulted  -EKG concerning for AV dissociation.   -Labs, troponin, ext all pending  -Currently rate controlled, BP stable and patient asymptomatic.   -Pacer pads placed in case,   -monitor on tele, appreciate cardiac input       #Recurrent acute biliary pancreatitis  -reports 1 year of biliary colic and intermittent bouts of gallstone pancreatitis. Also uses etoh and meth occasionally. -CT showing pancreatisis/stranding without necrosis. No cholecystitis but last RUQ showed cholelithiasis and fatty liver, thus plans were GB removal next week but patient came in given the extreme symptoms. PLAN  -NPO, Genie Milder, protonix, monitor for complications, consult for possible GB removal inpatient   -as per GI, serial abdominal exams       #Substance abuse  -reports methamphetamine, etoh use. Reluctant to say when last used. Last utox was positive.   PLAN  -Utox and Etoh pending []  -monitor for withdrawal symptoms, monitor on tele,       VTE on heparin infusion

## 2022-09-28 NOTE — ED NOTES
Another liter bolus of LR started per Verbal order Dr Shabana Lora, RN  09/28/22 [address] Jansen RN  09/28/22 6089

## 2022-09-28 NOTE — ED NOTES
The following labs were labeled with appropriate pt sticker and tubed to lab:     [x] Blue     [x] Lavender   [] on ice  [x] Green/yellow  [x] Green/black [] on ice  [] Arlester Core  [] on ice  [] Yellow  [] Red  [] Pink  [] ABG  [] VBG    [] COVID-19 swab    [] Rapid  [] PCR  [] Flu swab  [] Peds Viral Panel     [x] Urine Sample  [] Pelvic Cultures  [] Blood Cultures  [] X 2  [] STREP Cultures         Stewart Yoo RN  09/28/22 2018 [facility], RN  09/28/22 3431

## 2022-09-28 NOTE — ED NOTES
Report called to [facility] on Brenda Blackwell RN  09/28/22 6485

## 2022-09-28 NOTE — CONSULTS
[facility]) Gastroenterology  Consultation Note     . Chief Complaint: Abdominal pain  Reason for consult: Pancreatitis  History of present illness: This is a 25 y.o. male with PMH including alcoholism, methamphetamine abuse, recent admission for pancreatitis attributed to gallstones and alcohol on 8/13/2022 presents for abdominal pain that started about 3 days ago. Patient states he started having right lower quadrant pain which did not radiate and was worse with food and associated with nausea, chills and sweats. Patient states he drinks 1 beer every other week, last used meth 2 days ago via smoking and also smokes marijuana. He did not follow-up with surgery after his last hospitalization for pancreatitis. Lab work-up on arrival showing lipase greater than 3000 with elevated bilirubin and LFTs, No leukocytosis and alcohol level was undetectable on arrival.  Hematocrit was notably 50.6 on arrival down to 47.5 after fluid resuscitation. .  CT abdomen pelvis shows acute pancreatitis similar to CT exam last month along with gallbladder distention and extrahepatic ductal dilation up to 8 mm. Patient has been resuscitated with 2 L LR and his hospital course has been complicated by atrial fibrillation which he is now on amiodarone and diltiazem and heparin drips for. Previous GI history:   Pancreatitis secondary to gallstones and alcohol, no EGD or colonoscopy on file    Past Medical/Social/Family History:  Past Medical History:   Diagnosis Date    Drug abuse ([address]     Gallstone     Pancreatitis      Past Surgical History:   Procedure Laterality Date    TYMPANOSTOMY TUBE PLACEMENT       History reviewed. No pertinent family history. Previous records/history/ and notes were reviewed    Allergies: Allergies   Allergen Reactions    Augmentin [Amoxicillin-Pot Clavulanate]        Home medications:  Prior to Admission medications    Medication Sig Start Date End Date Taking?  Authorizing Provider ibuprofen (ADVIL;MOTRIN) 200 MG tablet Take 1,000 mg by mouth every 6 hours as needed for Pain    Historical Provider, MD     .  Current Medications:  Scheduled Meds:   sodium chloride flush  5-40 mL IntraVENous 2 times per day    aspirin  324 mg Oral Daily    lactated ringers bolus  1,000 mL IntraVENous Once     . Continuous Infusions:   lactated ringers 250 mL/hr at 09/28/22 0930    Followed by    lactated ringers      sodium chloride      dilTIAZem 5 mg/hr (09/28/22 0743)    heparin (PORCINE) Infusion 12 Units/kg/hr (09/28/22 0855)    amiodarone 1 mg/min (09/28/22 1239)    Followed by    amiodarone       . PRN Meds:sodium chloride flush, sodium chloride, potassium chloride, magnesium sulfate, HYDROmorphone **OR** HYDROmorphone, ondansetron **OR** ondansetron, heparin (porcine), heparin (porcine), fentanNYL    REVIEW OF SYSTEMS:     10 point review of systems reviewed and negative except for as stated above    PHYSICAL EXAM:    BP (!) 136/100   Pulse 97   Temp 98.3 °F (36.8 °C) (Oral)   Resp 18   SpO2 99%   . TMAX[24]    Physical Exam:  General:  Pleasant and cooperative. No apparent distress. HEENT:  Normocephalic, atraumatic, mucus membranes moist.   Neck:  Trachea midline. No JVD  Chest:  Clear to auscultation bilaterally. No wheezes, rales, or rhonchi. CV: Atrial fibrillation with rate 110s. No murmur, no gallops, no rubs  Abdomen:  Abdomen is soft, tender and left upper quadrant, epigastrium and right lower quadrant to palpation, non-distended, no rebound or guarding. Extremities:  No lower extremity edema or cyanosis, peripheral pulses intact  Neurological:  AAOx3. No focal deficits. Skin:  Warm and dry. Imaging:   CT ABDOMEN PELVIS W IV CONTRAST Additional Contrast? None    Result Date: 9/27/2022  1.  Severe uncomplicated acute pancreatitis, not significantly changed from last month's exam. 2. New mild gallbladder distention without cholelithiasis, gallbladder wall thickening or pericholecystic fluid. This may reflect a prolonged interval since the patient's last meal. However, if there is clinical concern for cholecystitis, further evaluation with a nuclear HIDA scan could be considered. 3. New mild extrahepatic biliary ductal dilatation. If clinical or laboratory findings suggest biliary obstruction, further evaluation with MRCP or ERCP could be considered. XR CHEST PORTABLE    Result Date: 9/28/2022  No acute intrathoracic process. Hemotological labs: Anemia studies:  No results for input(s): LABIRON, TIBC, FERRITIN, [id number], FOLATE, OCCULTBLD in the last 72 hours. CBC:  Recent Labs     09/27/22 2226 09/28/22 0321 09/28/22  0657   WBC 10.2 13.4 11.6   HGB 15.8 17.4* 16.0   MCV 87.3 88.5 91.2   RDW 14.3 13.2 13.1    219 224       PT/INR:  No results for input(s): PROTIME, INR in the last 72 hours. BMP:  Recent Labs     09/27/22 2226 09/28/22 0321    138   K 3.8 4.2    100   CO2 19* 21   BUN 21* 20   CREATININE 0.55* 0.55*   GLUCOSE 120* 130*   CALCIUM 9.6 9.5       Liver work up:  Hepatitis Functional Panel:  Recent Labs     09/28/22 0321 09/28/22  0657   ALKPHOS 149*  --    *  --    *  --    PROT 7.3  --    BILITOT 6.7*  --    BILIDIR  --  2.8*   LABALBU 4.8  --        Amylase/Lipase/Ammonia:  Recent Labs     09/27/22 2226 09/28/22 0321   LIPASE >3,000* >3,000*       Acute Hepatitis Panel:  No results found for: HEPBSAG, HEPCAB, HEPBIGM, HEPAIGM         Principal Problem:    Acute pancreatitis without infection or necrosis, unspecified pancreatitis type  Active Problems:    Acute pancreatitis, unspecified complication status, unspecified pancreatitis type  Resolved Problems:    * No resolved hospital problems.  *       GI Impression:  #Acute recurrent pancreatitis likely secondary to gallstone and possible contribution of alcohol  #Transaminitis  #CBD dilation to 8 mm  #Atrial fibrillation with RVR  #History of substance abuse including alcohol      Recommendations and plan:  -Patient was resuscitated with LR. We will give additional 1 L LR bolus followed by 250 mL/h. Monitor hemoglobin and BUN for adequate hydration. Monitor for signs of volume overload.   Decreased to 150 mL/h after 12 hours   -We will obtain stat MRCP to evaluate for choledocholithiasis  -Recommend surgical consultation for potential cholecystectomy this hospital stay  -Cardiology following for atrial fibrillation  -Encourage abstinence from alcohol  -Please wait for attending signature        Dejuan Heath  Internal Medicine PGY-2  Please Perfect Serve with any questions

## 2022-09-28 NOTE — ED NOTES
Contact Crystal's (Mom) supervisor if you can't reach her by cell phone for updates or change in status.   Crystal's supervisor- Rommel Reynolds [phone], as well as Kelly Shin [phone]          Bren Garrett RN  09/28/22 6023

## 2022-09-28 NOTE — ED TRIAGE NOTES
Pt presents to the ED by EMS as a transfer [facility] with c/o abd pain (pancreatitis). Pt has a hx of drug and alcohol use, last use was two days ago per report. Pt place on full cardiac monitor. Call light within reach.

## 2022-09-28 NOTE — ED NOTES
Pt resting on stretcher. RR even and unlabored. No distress noted. Call light within reach.        Stephen Aguilera RN  09/28/22 7598

## 2022-09-28 NOTE — ED NOTES
Pt went tachycardic. EKG obtained. Pt found to be in A. Fib RVR. Rapid response called.       Frances Pedroza RN  09/28/22 9370

## 2022-09-28 NOTE — ED NOTES
Writer solis served Dr Toña Gaytan about patient holding off on MRI until patient is more stable     Ernesto Howard, RN  09/28/22 4900

## 2022-09-28 NOTE — ED NOTES
Pt placed on 2L NC d/t desatting to 88% on RA.       Juwan Pedersen RN  09/28/22 0329       Juwan Pedersen RN  09/28/22 0333

## 2022-09-28 NOTE — ED NOTES
[address] [facility] cardiology regarding increased heart rate     Tremaine Friedman RN  09/28/22 6072

## 2022-09-28 NOTE — PROGRESS NOTES
SPIRITUAL CARE DEPARTMENT - [address]  PROGRESS NOTE    Shift date: 9/27/2022  Shift day: Tuesday   Shift # 3    Room # 18/18   Name: Darshana Ochoa                [address]: [address] [facility]: Unknown    Referral: Rapid Response    Admit Date & Time: 9/28/2022  2:58 AM    Assessment:  Lenora Miller is a 25 y.o. male who was paged out as an \"RRT Alert,\" due to \"AFIB with RVR. \" Upon entering the room writer observes patient somewhat lethargic when answering questions. Patient became more alert throughout encounter. Intervention:  Writer introduced self and title as .  learned about patient's symptoms as well as his support system. Patient confirmed that he would like his mother to come to the hospital.  attempted to call patient's mother, Crystal, per nurse request, however, she did not answer line.  contacted mother's supervisor and requested that he have her call us back.  received call back from patient's mother who confirmed that she is able to be released from work and will be coming to the hospital. Patient's mother is traveling from [place], [place], and will be arriving to the ED in a few hours.  followed up with patient to inform him that his mother will be arriving to the ED. Outcome:  Patient and mother thanked  for visit and care. Plan:  Chaplains will remain available to offer spiritual and emotional support as needed. 09/28/22 4999   Encounter Summary   Service Provided For: Patient; Family   Referral/Consult From: Multi-disciplinary team  (RRT Alert)   Support System Parent;Significant other   Last Encounter  09/27/22   Complexity of Encounter Moderate   Begin Time 0648   End Time  0712   Total Time Calculated 24 min   Encounter    Type Initial Screen/Assessment   Crisis   Type Rapid Response   Spiritual/Emotional needs   Type Spiritual Support   Assessment/Intervention/Outcome   Assessment Anxious; Fearful  (Experiencing Pain & Discomfort)   Intervention Active listening;Discussed illness injury and its impact; Explored/Affirmed feelings, thoughts, concerns;Explored Coping Skills/Resources;Sustaining Presence/Ministry of presence   Outcome Receptive   Plan and Referrals   Plan/Referrals Continue to visit, (comment)  (as needed)     Electronically signed by Davina Borden on 9/28/2022 at 6:58 AM.  [facility]  [phone]

## 2022-09-28 NOTE — CONSULTS
General Surgery: ER Consult Note        PATIENT NAME: Amador Wing   YOB: 2000    ADMISSION DATE: 9/28/2022  2:58 AM     Admitting Provider: Sky Watson MD    Consulted Physician: Dr. Merced Arora: 9/28/2022    Chief Complaint:  Abdominal Pain  Consult Regarding:  pancreatitis with concern for cystis    HISTORY OF PRESENT ILLNESS:  The patient is a 25 y.o. male  who was admitted on 9/28/22 and is seen and evaluated in the emergency department at [facility]. PMH including alcoholism, methamphetamine abuse, recent admission for pancreatitis attributed to gallstones and alcohol on 8/13/2022. Pt presents for abdominal pain that started about 3 days ago. Patient states he started having right lower quadrant pain which did not radiate and was worse with food and associated with nausea, chills and sweats. Patient states he drinks 1 beer every other week, last used meth 2 days ago via smoking and also smokes marijuana. He did not follow-up with surgery after his last hospitalization for pancreatitis. Per pts mother he was scheduled to meet with surgeon to discuss removal of gallbladder but surgeon had left practice. Lab work-up on arrival showing lipase greater than 3000 with elevated bilirubin and LFTs, No leukocytosis and alcohol level was undetectable on arrival.  Hematocrit was notably 50.6 on arrival down to 47.5 after fluid resuscitation. CT abdomen pelvis shows acute pancreatitis similar to CT exam last month along with gallbladder distention and extrahepatic ductal dilation up to 8 mm. Patient has been resuscitated with 2 L LR and his hospital course has been complicated by atrial fibrillation which he is now on amiodarone and diltiazem and heparin drips for.       Past Medical History:        Diagnosis Date    Drug abuse ([address]     Gallstone     Pancreatitis        Past Surgical History:        Procedure Laterality Date    TYMPANOSTOMY TUBE PLACEMENT Medications Prior to Admission:   Not in a hospital admission. Allergies:  Augmentin [amoxicillin-pot clavulanate]    Social History:   Social History     Socioeconomic History    Marital status: Single     Spouse name: Not on file    Number of children: Not on file    Years of education: Not on file    Highest education level: Not on file   Occupational History    Not on file   Tobacco Use    Smoking status: Every Day     Packs/day: 1.00     Years: 4.00     Pack years: 4.00     Types: Cigarettes     Start date: 2017    Smokeless tobacco: Never   Vaping Use    Vaping Use: Former   Substance and Sexual Activity    Alcohol use: Not Currently     Comment: last drank a week ago. Drug use: Yes     Types: Marijuana (Weed), Methamphetamines (Crystal Meth)     Comment: smoked 2 days ago. Sexual activity: Not on file   Other Topics Concern    Not on file   Social History Narrative    Not on file     Social Determinants of Health     Financial Resource Strain: Not on file   Food Insecurity: Not on file   Transportation Needs: Not on file   Physical Activity: Not on file   Stress: Not on file   Social Connections: Not on file   Intimate Partner Violence: Not on file   Housing Stability: Not on file       Family History:   History reviewed. No pertinent family history. REVIEW OF SYSTEMS:    CONSTITUTIONAL: Denies recent weight loss, fatigue, fevers, chills. HEENT: Denies rhinorrhea, dysphagia, odynphagia. CARDIOVASCULAR: Denies history of MI, recent chest pain. RESPIRATORY: Denies recent history of shortness of breath or history of PE. GASTROINTESTINAL: Admits abdominal pain, pain with eating  GENITOURINARY: Denies increased frequency or dysuria. HEMATOLOGIC/LYMPHATIC: Denies history of anemia or DVTs.     PHYSICAL EXAM:    VITALS:  /76   Pulse 67   Temp 98.3 °F (36.8 °C) (Oral)   Resp 18   SpO2 100%   INTAKE/OUTPUT:     Intake/Output Summary (Last 24 hours) at 9/28/2022 1702  Last data filed at 9/28/2022 1518  Gross per 24 hour   Intake 3750 ml   Output 1800 ml   Net 1950 ml       CONSTITUTIONAL:  awake, alert, mild distress, not distressed and normal weight  HEENT: Normocephalic/atraumatic, without obvious abnormality. NECK:  Supple, symmetrical, trachea midline   CARDIOVASCULAR: A-fib  ABDOMEN: Soft, nondistended, some tenderness to palpation diffusely throughout the right upper and lower quadrants, as well as the epigastric area. There is no rebound, guarding, rigidity. MUSCULOSKELETAL: Movement in all extremities bilaterally. SKIN: No cyanosis, rashes, or edema noted. Orientation:   oriented to person, place, and time    IV Access:  Left      CBC with Differential:    Lab Results   Component Value Date/Time    WBC 11.6 09/28/2022 06:57 AM    RBC 5.21 09/28/2022 06:57 AM    HGB 16.0 09/28/2022 06:57 AM    HCT 47.5 09/28/2022 06:57 AM     09/28/2022 06:57 AM    MCV 91.2 09/28/2022 06:57 AM    MCH 30.7 09/28/2022 06:57 AM    MCHC 33.7 09/28/2022 06:57 AM    RDW 13.1 09/28/2022 06:57 AM    LYMPHOPCT 6 09/28/2022 03:21 AM    MONOPCT 4 09/28/2022 03:21 AM    BASOPCT 0 09/28/2022 03:21 AM    MONOSABS 0.54 09/28/2022 03:21 AM    LYMPHSABS 0.82 09/28/2022 03:21 AM    EOSABS <0.03 09/28/2022 03:21 AM    BASOSABS 0.03 09/28/2022 03:21 AM    DIFFTYPE YES 09/27/2022 10:26 PM       Pertinent Radiology:   CT ABDOMEN PELVIS W IV CONTRAST Additional Contrast? None    Result Date: 9/27/2022  EXAMINATION: CT ABDOMEN PELVIS W IV CONTRAST, 9/27/2022 10:57 PM EDT HISTORY: Abdominal pain. History of pancreatitis. COMPARISON: CT abdomen pelvis, 8/13/2022. TECHNIQUE: CT scan of the abdomen and pelvis was performed using 75 mL of Isovue-370 IV contrast. Sagittal and coronal reconstructions are provided. CT dose reduction technique was used, including Automated Exposure Control. FINDINGS: CT ABDOMEN: The lung bases and pleural spaces are clear. Cardiac size is normal. There is no pericardial effusion.  Severe peripancreatic fluid and fat stranding are noted consistent with prominent acute pancreatitis, not significantly changed from last month's exam. No complicating features are seen. There is mild gallbladder distention without cholelithiasis or cholecystitis. The common bile duct is dilated at 8 mm in the pancreatic head on image 35. No intrinsic calcified stone is seen. The liver, spleen, adrenal glands, kidneys, aorta, IVC, stomach and small bowel appear unremarkable. CT PELVIS: A normal appendix is seen on image 90 of series 2. The pelvic small bowel loops, prostate and urinary bladder are unremarkable. There is a normal volume of stool and gas in the colon. No loculated fluid or free air is seen. No acute osseous abnormality or suspicious bony lesion is identified. 1. Severe uncomplicated acute pancreatitis, not significantly changed from last month's exam. 2. New mild gallbladder distention without cholelithiasis, gallbladder wall thickening or pericholecystic fluid. This may reflect a prolonged interval since the patient's last meal. However, if there is clinical concern for cholecystitis, further evaluation with a nuclear HIDA scan could be considered. 3. New mild extrahepatic biliary ductal dilatation. If clinical or laboratory findings suggest biliary obstruction, further evaluation with MRCP or ERCP could be considered. US GALLBLADDER RUQ    Result Date: 9/28/2022  EXAMINATION: RIGHT UPPER QUADRANT ULTRASOUND 9/28/2022 6:04 pm COMPARISON: Abdomen and pelvis CT 09/27/2022, limited abdominal sonogram 08/14/2022 HISTORY: ORDERING SYSTEM PROVIDED HISTORY: gallstone, CBD stone adn dilation TECHNOLOGIST PROVIDED HISTORY: gallstone, CBD stone adn dilation FINDINGS: LIVER:  Normal size and echotexture. Mildly increased echogenicity with minimal geographic normal echogenicity along the gallbladder fossa. No obvious surface nodularity nor focal lesions.  BILIARY SYSTEM:  Gallstones in the dependent gallbladder neck. Mild gallbladder wall thickening. No gallbladder distention nor pericholecystic fluid. No reported right upper quadrant pain during examination. No intrahepatic biliary dilation. Minimally dilated common duct, measuring 0.7 cm. PANCREAS:  Partially obscured by overlying bowel gas. Visualized portions with heterogeneous echogenicity and echotexture as well as indistinct anterior margins. Suspected peripancreatic fluid. RIGHT KIDNEY:  Normal size, echogenicity, and parenchymal thickness. No hydronephrosis, shadowing calculi, nor perinephric fluid. OTHER:  Patent main portal vein with normal directional flow. Trace perihepatic free intraperitoneal fluid. 1. Incompletely seen pancreas with changes of pancreatitis better demonstrated on CT. 2. Cholelithiasis. Mild gallbladder distention can be seen with cholecystitis, but other secondary sonographic findings of cholecystitis are absent. The wall thickening may be reactive to pancreatitis. Consider further evaluation with a nuclear medicine hepatobiliary scan if there are clinical findings of cholecystitis. 3. Minimal extrahepatic biliary dilation. Consider further evaluation with MRCP if there are clinical findings of cholestasis. 4. Trace perihepatic ascites, likely reactive. 5. Mild hepatic steatosis with minimal fatty sparing along the gallbladder fossa. XR CHEST PORTABLE    Result Date: 9/28/2022  EXAMINATION: ONE XRAY VIEW OF THE CHEST 9/28/2022 7:32 am COMPARISON: 08/14/2022 HISTORY: ORDERING SYSTEM PROVIDED HISTORY: new onset a fib TECHNOLOGIST PROVIDED HISTORY: new onset a fib FINDINGS: Cardiomediastinal silhouette and pulmonary vasculature are within normal limits. No focal airspace consolidation, pneumothorax, or pleural effusion. No free air beneath the diaphragm. No acute osseous abnormality. No acute intrathoracic process.             ASSESSMENT:  Active Hospital Problems    Diagnosis Date Noted    Acute pancreatitis without infection or necrosis, unspecified pancreatitis type [K85.90] 09/28/2022     Priority: Medium    Acute pancreatitis, unspecified complication status, unspecified pancreatitis type [K85.90] 08/13/2022     Priority: Medium       26 y/o M PMH for etoh and amphetamine abuse presenting for pancreatitis with new mild gallbladder distention and 8mm extrahepatic biliary ductal dilatation on CT. lipase more than 3000    Plan:  Continue medical mgmt and supportive care per primary  Diet: NPO  IVF hydration   Consult to cardiology for afib: Heparin, amiodarone, diltiazem, f/u echo  Follow up MRCP and RUQ US  Will follow, likely cholecystectomy once pancreatitis resolves and more stable from cardiac standpoint    Patient discussed with attending surgeon Dr. Yovany Erazo     Electronically signed by Joy Pina DO on 9/28/2022 at 9:19 PM      Electronically signed by Fariha Castañeda DO on 9/28/2022 at 6:23 PM  I attest that I was present with the resident in the emergency department at [facility] and agree with the description of findings and plan as dictated above. Right upper quadrant ultrasound demonstrating cholelithiasis and patient will require MRCP and possible ERCP/laparoscopic cholecystectomy prior to discharge.   Alvarado Levine MD

## 2022-09-28 NOTE — LETTER
Joann Montes  [address] [facility] [address]  Phone: [phone]             October 2, 2022    Patient: Shaila Ruiz   YOB: 2000   Date of Visit: 9/28/2022       To Whom It May Concern:    Shaila Ruiz was seen and treated in our facility  beginning 9/28/2022 until 10/2/2022. He may return to work on to be determined after Drs appointment on 10/3/2022.       Sincerely,       Lucas Wang RN         Signature:__________________________________

## 2022-09-29 LAB
ALBUMIN SERPL-MCNC: 3.7 G/DL (ref 3.5–5.2)
ALBUMIN/GLOBULIN RATIO: 1.9 (ref 1–2.5)
ALP BLD-CCNC: 107 U/L (ref 40–129)
ALT SERPL-CCNC: 186 U/L (ref 5–41)
ANION GAP SERPL CALCULATED.3IONS-SCNC: 16 MMOL/L (ref 9–17)
AST SERPL-CCNC: 35 U/L
BILIRUB SERPL-MCNC: 1.4 MG/DL (ref 0.3–1.2)
BUN BLDV-MCNC: 6 MG/DL (ref 6–20)
CALCIUM IONIZED: 1.17 MMOL/L (ref 1.13–1.33)
CALCIUM SERPL-MCNC: 8.2 MG/DL (ref 8.6–10.4)
CHLORIDE BLD-SCNC: 102 MMOL/L (ref 98–107)
CO2: 21 MMOL/L (ref 20–31)
CREAT SERPL-MCNC: 0.53 MG/DL (ref 0.7–1.2)
EKG ATRIAL RATE: 100 BPM
EKG ATRIAL RATE: 153 BPM
EKG ATRIAL RATE: 357 BPM
EKG Q-T INTERVAL: 308 MS
EKG Q-T INTERVAL: 356 MS
EKG Q-T INTERVAL: 388 MS
EKG QRS DURATION: 100 MS
EKG QRS DURATION: 102 MS
EKG QRS DURATION: 114 MS
EKG QTC CALCULATION (BAZETT): 433 MS
EKG QTC CALCULATION (BAZETT): 447 MS
EKG QTC CALCULATION (BAZETT): 482 MS
EKG R AXIS: 20 DEGREES
EKG R AXIS: 34 DEGREES
EKG R AXIS: 60 DEGREES
EKG T AXIS: 34 DEGREES
EKG T AXIS: 39 DEGREES
EKG T AXIS: 43 DEGREES
EKG VENTRICULAR RATE: 147 BPM
EKG VENTRICULAR RATE: 75 BPM
EKG VENTRICULAR RATE: 95 BPM
GFR NON-AFRICAN AMERICAN: ABNORMAL ML/MIN
GFR SERPL CREATININE-BSD FRML MDRD: ABNORMAL ML/MIN/{1.73_M2}
GLUCOSE BLD-MCNC: 100 MG/DL (ref 70–99)
INR BLD: 1.1
LIPASE: 1848 U/L (ref 13–60)
LV EF: 55 %
LVEF MODALITY: NORMAL
MAGNESIUM: 1.6 MG/DL (ref 1.7–2.2)
PARTIAL THROMBOPLASTIN TIME: 31.8 SEC (ref 20.5–30.5)
PARTIAL THROMBOPLASTIN TIME: 32.9 SEC (ref 20.5–30.5)
PARTIAL THROMBOPLASTIN TIME: 36.3 SEC (ref 20.5–30.5)
PARTIAL THROMBOPLASTIN TIME: 37.7 SEC (ref 20.5–30.5)
PHOSPHORUS: 2.7 MG/DL (ref 2.5–4.5)
POTASSIUM SERPL-SCNC: 3.5 MMOL/L (ref 3.7–5.3)
PROTHROMBIN TIME: 11.2 SEC (ref 9.1–12.3)
SODIUM BLD-SCNC: 139 MMOL/L (ref 135–144)
TOTAL PROTEIN: 5.6 G/DL (ref 6.4–8.3)
TRIGL SERPL-MCNC: 83 MG/DL

## 2022-09-29 PROCEDURE — 85610 PROTHROMBIN TIME: CPT

## 2022-09-29 PROCEDURE — 99232 SBSQ HOSP IP/OBS MODERATE 35: CPT | Performed by: FAMILY MEDICINE

## 2022-09-29 PROCEDURE — 6370000000 HC RX 637 (ALT 250 FOR IP): Performed by: NURSE PRACTITIONER

## 2022-09-29 PROCEDURE — 36415 COLL VENOUS BLD VENIPUNCTURE: CPT

## 2022-09-29 PROCEDURE — 1200000000 HC SEMI PRIVATE

## 2022-09-29 PROCEDURE — 84100 ASSAY OF PHOSPHORUS: CPT

## 2022-09-29 PROCEDURE — 2580000003 HC RX 258: Performed by: NURSE PRACTITIONER

## 2022-09-29 PROCEDURE — 93010 ELECTROCARDIOGRAM REPORT: CPT | Performed by: INTERNAL MEDICINE

## 2022-09-29 PROCEDURE — 6370000000 HC RX 637 (ALT 250 FOR IP): Performed by: INTERNAL MEDICINE

## 2022-09-29 PROCEDURE — C9113 INJ PANTOPRAZOLE SODIUM, VIA: HCPCS | Performed by: NURSE PRACTITIONER

## 2022-09-29 PROCEDURE — 6360000002 HC RX W HCPCS: Performed by: NURSE PRACTITIONER

## 2022-09-29 PROCEDURE — 85730 THROMBOPLASTIN TIME PARTIAL: CPT

## 2022-09-29 PROCEDURE — 6360000002 HC RX W HCPCS: Performed by: INTERNAL MEDICINE

## 2022-09-29 PROCEDURE — 6360000002 HC RX W HCPCS: Performed by: FAMILY MEDICINE

## 2022-09-29 PROCEDURE — 84478 ASSAY OF TRIGLYCERIDES: CPT

## 2022-09-29 PROCEDURE — 80053 COMPREHEN METABOLIC PANEL: CPT

## 2022-09-29 PROCEDURE — 93306 TTE W/DOPPLER COMPLETE: CPT

## 2022-09-29 PROCEDURE — 83690 ASSAY OF LIPASE: CPT

## 2022-09-29 PROCEDURE — 2500000003 HC RX 250 WO HCPCS

## 2022-09-29 PROCEDURE — 82330 ASSAY OF CALCIUM: CPT

## 2022-09-29 PROCEDURE — 83735 ASSAY OF MAGNESIUM: CPT

## 2022-09-29 RX ORDER — MAGNESIUM SULFATE IN WATER 40 MG/ML
2000 INJECTION, SOLUTION INTRAVENOUS ONCE
Status: COMPLETED | OUTPATIENT
Start: 2022-09-29 | End: 2022-09-29

## 2022-09-29 RX ORDER — PROMETHAZINE HYDROCHLORIDE 25 MG/ML
6.25 INJECTION, SOLUTION INTRAMUSCULAR; INTRAVENOUS EVERY 6 HOURS PRN
Status: DISCONTINUED | OUTPATIENT
Start: 2022-09-29 | End: 2022-10-02 | Stop reason: HOSPADM

## 2022-09-29 RX ORDER — TRAMADOL HYDROCHLORIDE 50 MG/1
50 TABLET ORAL EVERY 4 HOURS PRN
Status: DISCONTINUED | OUTPATIENT
Start: 2022-09-29 | End: 2022-10-02 | Stop reason: HOSPADM

## 2022-09-29 RX ORDER — NICOTINE 21 MG/24HR
1 PATCH, TRANSDERMAL 24 HOURS TRANSDERMAL DAILY
Status: DISCONTINUED | OUTPATIENT
Start: 2022-09-29 | End: 2022-10-02 | Stop reason: HOSPADM

## 2022-09-29 RX ORDER — CLINDAMYCIN PHOSPHATE 600 MG/50ML
600 INJECTION INTRAVENOUS EVERY 8 HOURS
Status: DISCONTINUED | OUTPATIENT
Start: 2022-09-29 | End: 2022-10-02 | Stop reason: HOSPADM

## 2022-09-29 RX ORDER — DILTIAZEM HYDROCHLORIDE 120 MG/1
120 CAPSULE, COATED, EXTENDED RELEASE ORAL DAILY
Status: DISCONTINUED | OUTPATIENT
Start: 2022-09-29 | End: 2022-10-02 | Stop reason: HOSPADM

## 2022-09-29 RX ORDER — KETOROLAC TROMETHAMINE 15 MG/ML
15 INJECTION, SOLUTION INTRAMUSCULAR; INTRAVENOUS EVERY 6 HOURS PRN
Status: DISCONTINUED | OUTPATIENT
Start: 2022-09-29 | End: 2022-09-29

## 2022-09-29 RX ADMIN — HYDROMORPHONE HYDROCHLORIDE 0.5 MG: 1 INJECTION, SOLUTION INTRAMUSCULAR; INTRAVENOUS; SUBCUTANEOUS at 23:09

## 2022-09-29 RX ADMIN — Medication 19 UNITS/KG/HR: at 21:21

## 2022-09-29 RX ADMIN — PROMETHAZINE HYDROCHLORIDE 6.25 MG: 25 INJECTION INTRAMUSCULAR; INTRAVENOUS at 09:34

## 2022-09-29 RX ADMIN — HEPARIN SODIUM 2000 UNITS: 1000 INJECTION INTRAVENOUS; SUBCUTANEOUS at 23:05

## 2022-09-29 RX ADMIN — SODIUM CHLORIDE, PRESERVATIVE FREE 10 ML: 5 INJECTION INTRAVENOUS at 20:04

## 2022-09-29 RX ADMIN — KETOROLAC TROMETHAMINE 15 MG: 15 INJECTION, SOLUTION INTRAMUSCULAR; INTRAVENOUS at 20:00

## 2022-09-29 RX ADMIN — HEPARIN SODIUM 2000 UNITS: 1000 INJECTION INTRAVENOUS; SUBCUTANEOUS at 15:45

## 2022-09-29 RX ADMIN — KETOROLAC TROMETHAMINE 15 MG: 15 INJECTION, SOLUTION INTRAMUSCULAR; INTRAVENOUS at 03:05

## 2022-09-29 RX ADMIN — POTASSIUM CHLORIDE 10 MEQ: 10 INJECTION, SOLUTION INTRAVENOUS at 14:59

## 2022-09-29 RX ADMIN — SODIUM CHLORIDE, PRESERVATIVE FREE 40 MG: 5 INJECTION INTRAVENOUS at 13:54

## 2022-09-29 RX ADMIN — DILTIAZEM HYDROCHLORIDE 120 MG: 120 CAPSULE, COATED, EXTENDED RELEASE ORAL at 11:03

## 2022-09-29 RX ADMIN — POTASSIUM CHLORIDE 10 MEQ: 10 INJECTION, SOLUTION INTRAVENOUS at 12:44

## 2022-09-29 RX ADMIN — CLINDAMYCIN IN 5 PERCENT DEXTROSE 600 MG: 12 INJECTION, SOLUTION INTRAVENOUS at 08:41

## 2022-09-29 RX ADMIN — ONDANSETRON 4 MG: 2 INJECTION INTRAMUSCULAR; INTRAVENOUS at 02:21

## 2022-09-29 RX ADMIN — CLINDAMYCIN IN 5 PERCENT DEXTROSE 600 MG: 12 INJECTION, SOLUTION INTRAVENOUS at 17:06

## 2022-09-29 RX ADMIN — HEPARIN SODIUM 2000 UNITS: 1000 INJECTION INTRAVENOUS; SUBCUTANEOUS at 01:19

## 2022-09-29 RX ADMIN — TRAMADOL HYDROCHLORIDE 50 MG: 50 TABLET, COATED ORAL at 21:57

## 2022-09-29 RX ADMIN — ONDANSETRON 4 MG: 2 INJECTION INTRAMUSCULAR; INTRAVENOUS at 08:38

## 2022-09-29 RX ADMIN — HYDROMORPHONE HYDROCHLORIDE 0.5 MG: 1 INJECTION, SOLUTION INTRAMUSCULAR; INTRAVENOUS; SUBCUTANEOUS at 20:00

## 2022-09-29 RX ADMIN — HYDROMORPHONE HYDROCHLORIDE 0.5 MG: 1 INJECTION, SOLUTION INTRAMUSCULAR; INTRAVENOUS; SUBCUTANEOUS at 12:52

## 2022-09-29 RX ADMIN — Medication 18 UNITS/KG/HR: at 04:42

## 2022-09-29 RX ADMIN — SODIUM CHLORIDE, POTASSIUM CHLORIDE, SODIUM LACTATE AND CALCIUM CHLORIDE: 600; 310; 30; 20 INJECTION, SOLUTION INTRAVENOUS at 18:31

## 2022-09-29 RX ADMIN — POTASSIUM CHLORIDE 10 MEQ: 10 INJECTION, SOLUTION INTRAVENOUS at 11:43

## 2022-09-29 RX ADMIN — SODIUM CHLORIDE, POTASSIUM CHLORIDE, SODIUM LACTATE AND CALCIUM CHLORIDE: 600; 310; 30; 20 INJECTION, SOLUTION INTRAVENOUS at 04:40

## 2022-09-29 RX ADMIN — KETOROLAC TROMETHAMINE 15 MG: 15 INJECTION, SOLUTION INTRAMUSCULAR; INTRAVENOUS at 09:04

## 2022-09-29 RX ADMIN — POTASSIUM CHLORIDE 10 MEQ: 10 INJECTION, SOLUTION INTRAVENOUS at 13:43

## 2022-09-29 RX ADMIN — FENTANYL CITRATE 25 MCG: 50 INJECTION, SOLUTION INTRAMUSCULAR; INTRAVENOUS at 02:12

## 2022-09-29 RX ADMIN — MAGNESIUM SULFATE HEPTAHYDRATE 2000 MG: 40 INJECTION, SOLUTION INTRAVENOUS at 09:34

## 2022-09-29 RX ADMIN — ASPIRIN 81 MG 324 MG: 81 TABLET ORAL at 08:39

## 2022-09-29 RX ADMIN — SODIUM CHLORIDE, PRESERVATIVE FREE 10 ML: 5 INJECTION INTRAVENOUS at 09:13

## 2022-09-29 RX ADMIN — HYDROMORPHONE HYDROCHLORIDE 0.5 MG: 1 INJECTION, SOLUTION INTRAMUSCULAR; INTRAVENOUS; SUBCUTANEOUS at 15:58

## 2022-09-29 RX ADMIN — HEPARIN SODIUM 2000 UNITS: 1000 INJECTION INTRAVENOUS; SUBCUTANEOUS at 09:19

## 2022-09-29 ASSESSMENT — PAIN DESCRIPTION - LOCATION
LOCATION: ABDOMEN
LOCATION: GENERALIZED;ABDOMEN;BACK
LOCATION: ABDOMEN
LOCATION: ABDOMEN;BACK
LOCATION: ABDOMEN
LOCATION: GENERALIZED

## 2022-09-29 ASSESSMENT — ENCOUNTER SYMPTOMS
NAUSEA: 1
VOMITING: 1
SHORTNESS OF BREATH: 0
ABDOMINAL PAIN: 1
CHEST TIGHTNESS: 0
WHEEZING: 0
CONSTIPATION: 0
COUGH: 0
DIARRHEA: 0
BLOOD IN STOOL: 0

## 2022-09-29 ASSESSMENT — PAIN SCALES - GENERAL
PAINLEVEL_OUTOF10: 4
PAINLEVEL_OUTOF10: 8
PAINLEVEL_OUTOF10: 5
PAINLEVEL_OUTOF10: 8
PAINLEVEL_OUTOF10: 2
PAINLEVEL_OUTOF10: 0
PAINLEVEL_OUTOF10: 10
PAINLEVEL_OUTOF10: 8
PAINLEVEL_OUTOF10: 4
PAINLEVEL_OUTOF10: 10
PAINLEVEL_OUTOF10: 9
PAINLEVEL_OUTOF10: 9
PAINLEVEL_OUTOF10: 8
PAINLEVEL_OUTOF10: 8
PAINLEVEL_OUTOF10: 6
PAINLEVEL_OUTOF10: 10
PAINLEVEL_OUTOF10: 9

## 2022-09-29 ASSESSMENT — PAIN DESCRIPTION - DESCRIPTORS
DESCRIPTORS: ACHING
DESCRIPTORS: ACHING

## 2022-09-29 ASSESSMENT — PAIN - FUNCTIONAL ASSESSMENT
PAIN_FUNCTIONAL_ASSESSMENT: ACTIVITIES ARE NOT PREVENTED
PAIN_FUNCTIONAL_ASSESSMENT: ACTIVITIES ARE NOT PREVENTED

## 2022-09-29 NOTE — PROGRESS NOTES
[facility]. [facility]   Gastroenterology Progress Note    Esperanza Calvillo is a 25 y.o. male patient. Hospitalization Day:1      Chief consult reason:   Pancreatitis    Subjective:  Patient seen and examined. No acute events overnight. Patient in normal sinus rhythm  Urine output is minimal, urine color is dark and appears concentrated  Resting in bed but does have pain in left upper quadrant with light palpation  No nausea or vomiting overnight  Patient having echo at bedside  LFTs greatly improved-almost normalized    VITALS:  /74   Pulse 85   Temp 97.9 °F (36.6 °C) (Axillary)   Resp 18   Wt 206 lb 12.7 oz (93.8 kg)   SpO2 96%   BMI 26.55 kg/m²   TEMPERATURE:  Current - Temp: 97.9 °F (36.6 °C); Max - Temp  Av.9 °F (36.6 °C)  Min: 97.7 °F (36.5 °C)  Max: 98.2 °F (36.8 °C)    Physical Assessment:  General appearance:  alert, cooperative and no distress  Mental Status:  oriented to person, place and time and normal affect  Lungs:  clear to auscultation bilaterally, normal effort  Heart:  regular rate and rhythm, no murmur  Abdomen:  soft, tender, nondistended, normal bowel sounds, no masses, hepatomegaly, splenomegaly  Extremities:  no edema, redness, tenderness in the calves  Skin:  no gross lesions, rashes, induration    Data Review:    Labs and Imaging:     CBC:  Recent Labs     22  0657   WBC 10.2 13.4 11.6   HGB 15.8 17.4* 16.0   MCV 87.3 88.5 91.2   RDW 14.3 13.2 13.1    219 224       ANEMIA STUDIES:  No results for input(s): LABIRON, TIBC, FERRITIN, [id number], FOLATE, OCCULTBLD in the last 72 hours.     BMP:  Recent Labs     22  0321 22  0454    138 139   K 3.8 4.2 3.5*    100 102   CO2 19* 21 21   BUN 21* 20 6   CREATININE 0.55* 0.55* 0.53*   GLUCOSE 120* 130* 100*   CALCIUM 9.6 9.5 8.2*       LFTS:  Recent Labs     22  2226 22  0321 22  0657 22  0454   ALKPHOS 140* 149*  --  107   * 397*  --  186*   * 151*  --  35   BILITOT 8.5* 6.7*  --  1.4*   BILIDIR  --   --  2.8*  --    LABALBU 4.6 4.8  --  3.7       Amylase/Lipase and Ammonia:  Recent Labs     09/27/22  2226 09/28/22  0321 09/29/22  0454   LIPASE >3,000* >3,000* 1,848*       Acute Hepatitis Panel:  No results found for: HEPBSAG, HEPCAB, HEPBIGM, HEPAIGM    HCV Genotype:  No results found for: HEPATITISCGENOTYPE    HCV Quantitative:  No results found for: HCVQNT    LIVER WORK UP:    AFP  No results found for: AFP    Alpha 1 antitrypsin   No results found for: A1A    DILLAN  Lab Results   Component Value Date/Time    DILLAN NEGATIVE 08/14/2022 02:38 PM       AMA  No results found for: MITOAB    ASMA  No results found for: SMOOTHMUSCAB    PT/INR  Recent Labs     09/29/22  0454   PROTIME 11.2   INR 1.1       Cancer Markers:  CEA:  No results for input(s): CEA in the last 72 hours. Ca 125:  No results for input(s):  in the last 72 hours. Ca 19-9:   Invalid input(s):   AFP: No results for input(s): AFP in the last 72 hours. Lactic acid:Invalid input(s): LACTIC ACID    Radiology Review:    No results found. Principal Problem:    Acute pancreatitis without infection or necrosis, unspecified pancreatitis type  Active Problems:    Acute pancreatitis, unspecified complication status, unspecified pancreatitis type  Resolved Problems:    * No resolved hospital problems. *       GI Impression:    Acute mild to moderate pancreatitis-no end organ damage, pseudocyst, abscess or necrosis noted on CT scan.   LFTs greatly improved today, lipase down to 1800  Polysubstance abuse  Alcohol misuse    Plan and Recommendations:    Recommend LR at 250 mL/hr  Strict I's and O's  N.p.o.-okay for few ice chips, meds with sips  Monitor daily labs including CBC BMP LFT  Monitor for alcohol withdrawal  Recommend CIWA  Recommend daily multivitamin thiamine and folate  Encourage ambulation, out of bed, self-care  EPC's for DVT prophylaxis  Please avoid NSAIDs as this can increase risk of developing peptic ulcer disease  Protonix 40 mg IV daily  Will follow        This plan was formulated in collaboration with Dr. Mariah Denton MD    Thank you for allowing me to participate in the care of your patient. Please feel free to contact me with any questions or concerns. [address] [facility]   [address]   [phone]  9/29/2022  12:07 PM    This note was created with the assistance of a speech-recognition program.  Although the intention is to generate a document that actually reflects the content of the visit, no guarantees can be provided that every mistake has been identified and corrected by editing.

## 2022-09-29 NOTE — PROGRESS NOTES
[facility])  Occupational Therapy Not Seen Note    DATE: 2022    NAME: Lynette Barry  MRN: [id number]   : 2000      Patient not seen this date for Occupational Therapy due to:    Patient independent with ADLs and functional tasks with no acute OT needs. Will defer OT evaluation at this time. Please reorder OT if future needs arise.        Electronically signed by YAZMIN Alarcon on 2022 at 11:38 AM

## 2022-09-29 NOTE — PROGRESS NOTES
[facility]  Office: [address], DO, Kiera Clay, DO, Stephanie Junior, DO, Harpreet Storyars Blood, DO, Crystal Martins MD, Steven Ladd MD, Isabella Robles MD, Bandar Lornezo MD,  Emilia Alejandro MD, Jamilah Hernandez MD, Silvana Cruz, DO, Mikey Sutherland MD,  Brandon Nelson MD, Danyell Elder MD, Geovanni Newsome DO, Soraya Paulino MD, Hamilton De Souza MD, Juwan Deras MD, Jamey Bird MD, Taylor Frank MD, Jose Roberto Rodríguez MD, Marco Marshall, DO, Leanne Gandara MD, Lisa Patiño MD, Maddi Munoz CNP,  Dimple Smith, CNP, Sofia Ortega, CNP, Jorge Liang, CNP,  Jennifer Rosa, [facility], Hailey Segal, CNP, Rachele Pinedo, CNP, Brenda Lomeli, CNP, Maricel Simmons CNP, Say Govea CNP, FANNIE CortezC, Yunior Gomez, CNS, Aletha Choudhury, [facility], Minor Andujar, CNP, Fabiana Muniz, CNP, Coleen Nguyen, [address] [place] [address]    Progress Note    9/29/2022    7:53 AM    Name:   John Stanley  MRN:     [id number]     Acct:      [de-identified]   Room:   [address] [place] [address] Day:  1  Admit Date:  9/28/2022  2:58 AM    PCP:   CHAPO Bradley  Code Status:  Full Code    Subjective:     C/C:   Chief Complaint   Patient presents with    Abdominal Pain     Interval History Status: not changed. Patient seen and evaluated at bedside, continues to feel about the same, unable to tolerate any p.o. intake with abdominal pain. Afebrile overnight. His lipase, liver enzymes and bilirubin are trending down. Evaluated by GI and general surgery yesterday    Brief History:     Per my partner  John Stanley is a 25 y.o.  Alroy Roger male with  hx of pancreatitis and cholecystitis, Drug abuse including methamphetamine a few days before presentation who has been having ~ one year of  intermittent episodes of abdominal pain pancreatitis and gallstone problems for over a year who presents with significant epigastric and RUQ  pain for 4 days. Patient reports inabilityh to take in any PO during this time. Does admit to methamphetamines according to the staff he used a couple days ago. CT with contrast on admission showed Severe uncomplicated acute pancreatitis, not significantly changed from last   month's exam.New mild gallbladder distention without cholelithiasis, gallbladder wall  thickening or pericholecystic fluid, New mild extrahepatic biliary ductal dilatation. Labs showed lipase 1400, elevated lFTs and biliribin . [facility] was [facility] symptomatic[facility] and given IV meropenem and transferred to [facility] for admission. Review of Systems:     Review of Systems   Constitutional:  Positive for activity change, appetite change and fatigue. Negative for chills, diaphoresis and fever. HENT:  Negative for congestion. Eyes:  Negative for visual disturbance. Respiratory:  Negative for cough, chest tightness, shortness of breath and wheezing. Cardiovascular:  Negative for chest pain, palpitations and leg swelling. Gastrointestinal:  Positive for abdominal pain, nausea and vomiting. Negative for blood in stool, constipation and diarrhea. Genitourinary:  Negative for difficulty urinating. Neurological:  Negative for dizziness, weakness, light-headedness, numbness and headaches. All other systems reviewed and are negative. Medications: Allergies:     Allergies   Allergen Reactions    Augmentin [Amoxicillin-Pot Clavulanate]        Current Meds:   Scheduled Meds:    nicotine  1 patch TransDERmal Daily    clindamycin (CLEOCIN) IV  600 mg IntraVENous Q8H    sodium chloride flush  5-40 mL IntraVENous 2 times per day    aspirin  324 mg Oral Daily     Continuous Infusions:    lactated ringers 150 mL/hr at 09/29/22 0440    sodium chloride      dilTIAZem Stopped (09/28/22 1900)    heparin (PORCINE) Infusion 18 Units/kg/hr (09/29/22 0442)    amiodarone 0.5 mg/min (09/28/22 1351)     PRN Meds: ketorolac, sodium chloride flush, sodium chloride, potassium chloride, magnesium sulfate, HYDROmorphone **OR** HYDROmorphone, ondansetron **OR** ondansetron, heparin (porcine), heparin (porcine), fentanNYL    Data:     Past Medical History:   has a past medical history of Drug abuse ([address] Gallstone, and Pancreatitis. Social History:   reports that he has been smoking cigarettes. He started smoking about 5 years ago. He has a 4.00 pack-year smoking history. He has never used smokeless tobacco. He reports that he does not currently use alcohol. He reports current drug use. Drugs: Marijuana (Weed) and Methamphetamines (Crystal Meth). Family History: History reviewed. No pertinent family history. Vitals:  BP (!) 141/79   Pulse (!) 132   Temp 98.2 °F (36.8 °C) (Axillary)   Resp 21   Wt 206 lb 12.7 oz (93.8 kg)   SpO2 97%   BMI 26.55 kg/m²   Temp (24hrs), Av.9 °F (36.6 °C), Min:97.7 °F (36.5 °C), Max:98.2 °F (36.8 °C)    No results for input(s): POCGLU in the last 72 hours. I/O (24Hr): Intake/Output Summary (Last 24 hours) at 2022 0753  Last data filed at 2022 0444  Gross per 24 hour   Intake 5495.43 ml   Output 2450 ml   Net 3045.43 ml       Labs:  Hematology:  Recent Labs     22  2226 22  0321 22  0657 22  0454   WBC 10.2 13.4 11.6  --    RBC 5.31 5.72 5.21  --    HGB 15.8 17.4* 16.0  --    HCT 46.3 50.6* 47.5  --    MCV 87.3 88.5 91.2  --    MCH 29.8 30.4 30.7  --    MCHC 34.1 34.4 33.7  --    RDW 14.3 13.2 13.1  --     219 224  --    MPV  --  10.5 10.4  --    INR  --   --   --  1.1     Chemistry:  Recent Labs     22  2226 22  0321 22  0657 22  0454    138  --  139   K 3.8 4.2  --  3.5*    100  --  102   CO2 19* 21  --  21   GLUCOSE 120* 130*  --  100*   BUN 21* 20  --  6   CREATININE 0.55* 0.55*  --  0.53*   MG  --   --  2.1 1.6*   ANIONGAP 17 17  --  16   LABGLOM Pediatric GFR requires additional information.   Refer to [facility] website for calculator. Pediatric GFR requires additional information. Refer to [facility] website for calculator. --  Pediatric GFR requires additional information. Refer to [facility] website for calculator. CALCIUM 9.6 9.5  --  8.2*   CAION  --   --   --  1.17   PHOS  --   --   --  2.7     Recent Labs     09/27/22  2226 09/28/22  0321 09/28/22  0657 09/29/22  0454   PROT 7.0 7.3  --  5.6*   LABALBU 4.6 4.8  --  3.7   TSH  --  0.74  --   --    * 151*  --  35   * 397*  --  186*   ALKPHOS 140* 149*  --  107   BILITOT 8.5* 6.7*  --  1.4*   BILIDIR  --   --  2.8*  --    LIPASE >3,000* >3,000*  --  1,848*   TRIG  --   --   --  83     ABG:No results found for: POCPH, PHART, PH, POCPCO2, OCJ8RSU, PCO2, POCPO2, PO2ART, PO2, POCHCO3, HUX6KSS, HCO3, NBEA, PBEA, BEART, BE, THGBART, THB, DTN5QMT, OKFN9KAC, J8IVIUDP, O2SAT, FIO2  Lab Results   Component Value Date/Time    SPECIAL 20ML RAC 09/27/2022 10:33 PM     Lab Results   Component Value Date/Time    CULTURE NO GROWTH 2 DAYS 09/27/2022 10:33 PM       Radiology:  CT ABDOMEN PELVIS W IV CONTRAST Additional Contrast? None    Result Date: 9/27/2022  1. Severe uncomplicated acute pancreatitis, not significantly changed from last month's exam. 2. New mild gallbladder distention without cholelithiasis, gallbladder wall thickening or pericholecystic fluid. This may reflect a prolonged interval since the patient's last meal. However, if there is clinical concern for cholecystitis, further evaluation with a nuclear HIDA scan could be considered. 3. New mild extrahepatic biliary ductal dilatation. If clinical or laboratory findings suggest biliary obstruction, further evaluation with MRCP or ERCP could be considered. US GALLBLADDER RUQ    Result Date: 9/28/2022  1. Incompletely seen pancreas with changes of pancreatitis better demonstrated on CT. 2. Cholelithiasis.   Mild gallbladder distention can be seen with cholecystitis, but other secondary sonographic findings of cholecystitis are absent. The wall thickening may be reactive to pancreatitis. Consider further evaluation with a nuclear medicine hepatobiliary scan if there are clinical findings of cholecystitis. 3. Minimal extrahepatic biliary dilation. Consider further evaluation with MRCP if there are clinical findings of cholestasis. 4. Trace perihepatic ascites, likely reactive. 5. Mild hepatic steatosis with minimal fatty sparing along the gallbladder fossa. XR CHEST PORTABLE    Result Date: 9/28/2022  No acute intrathoracic process. Physical Examination:        Physical Exam  Vitals and nursing note reviewed. Constitutional:       General: He is not in acute distress. Appearance: He is ill-appearing. HENT:      Head: Normocephalic and atraumatic. Eyes:      Conjunctiva/sclera: Conjunctivae normal.      Pupils: Pupils are equal, round, and reactive to light. Cardiovascular:      Rate and Rhythm: Normal rate and regular rhythm. Heart sounds: No murmur heard. Pulmonary:      Effort: Pulmonary effort is normal. No accessory muscle usage or respiratory distress. Breath sounds: No stridor. No decreased breath sounds, wheezing, rhonchi or rales. Abdominal:      General: Bowel sounds are normal. There is no distension. Palpations: Abdomen is soft. Abdomen is not rigid. Tenderness: There is abdominal tenderness in the right upper quadrant and epigastric area. There is no guarding. Musculoskeletal:         General: No tenderness. Skin:     General: Skin is warm and dry. Findings: No erythema, lesion or rash. Neurological:      Mental Status: He is alert and oriented to person, place, and time. Cranial Nerves: No cranial nerve deficit. Motor: No seizure activity. Psychiatric:         Speech: Speech normal.         Behavior: Behavior normal. Behavior is cooperative.        Assessment:        Hospital Problems             Last Modified POA    * (Principal) Acute pancreatitis without infection or necrosis, unspecified pancreatitis type 9/28/2022 Yes    Acute pancreatitis, unspecified complication status, unspecified pancreatitis type 9/28/2022 Yes       Plan:        Acute on recurrent biliary pancreatitis: Patient with known alcohol use as well, lipase is coming down, continue aggressive hydration, keep on clear liquid, antiemetic as tolerated. Patient has cholelithiasis and pancreatitis most likely triggered by stone: Evaluated by general surgery when he was admitted in with lisinopril infusion plan was for cholecystectomy, plan cyst time as well remains to be cholecystectomy wants pancreatitis resolved    New onset A. fib in a patient with alcohol use and drug use [positive for methamphetamine on admission]: Evaluated by cardiology and placed on heparin drip amiodarone Cardizem drip for rate control. Been on normal sinus rhythm since then anticipate discontinue drips, less likely to need anticoagulation as well on discharge. Echo is pending.     Discussed with the patient and the nurse  Continue to monitor    Erika Kingsley MD  9/29/2022  7:53 AM

## 2022-09-29 NOTE — PROGRESS NOTES
[address]   GENERAL SURGERY  PROGRESS NOTE      Patient Name: John Stanley  MRN: [id number]   Date of admission: 2022  Length of Stay: 1      Subjective:  Patient is seen and examined this morning. He was in A-fib yesterday, with heart rate up to 160. Cardiology was consulted, and started him on amiodarone, heparin, and diltiazem. Heart rate and rhythm is controlled this morning. Patient is in a lot of pain from the pancreatitis. He report feeling nauseated, and he vomited overnight. Pain medication Dilaudid was switched to Toradol, nausea improved afterward. No BM yet but he is passing gas. Trending down bilirubins from 6.8 to 1.4, lipase trending down from 3000 to 1848. Denies chest pain or shortness of breath. Vitals:                                                                            Temp  Av.9 °F (36.6 °C)  Min: 97.7 °F (36.5 °C)  Max: 98.2 °F (94.2 °C)  Systolic (35NOO), AXJ:926 , Min:101 , MB   Diastolic (53OTY), LOV:09, Min:57, Max:144  Pulse  Av  Min: 64  Max: 155  Resp  Av.5  Min: 8  Max: 25  SpO2: 97 % SpO2  Av.3 %  Min: 91 %  Max: 100 %     I/O's  I/O last 3 completed shifts: In: 6495.4 [I.V.:2945.4; IV AXIDHWYXH:3850]  Out: 8176 [Urine:2450]  Date 22 0000 - 22 2359   Shift 2404-8132 9616-9487 5059-7422 24 Hour Total   INTAKE   I.V.(mL/kg) 2745.4(29.3)   2745.4(29.3)   Shift Total(mL/kg) 7832.7(37.0)   2745.4(29.3)   OUTPUT   Urine(mL/kg/hr) 350   350   Shift Total(mL/kg) 350(3.7)   350(3.7)   Weight (kg) 93.8 93.8 93.8 93.8       Physical exam:  General: NAD, resting comfortably in bed. Lungs: Equal chest rise bilaterally, no audible wheezes or rhonchi. Heart: Regular rate and rhythm. Warm and well perfused. Abdomen: Extreme tender to palpate on the lower abdomen, mild tenderness on the epigastric area. No rebound or guarding.   Positive [place] sign   extremities: Moves all extremities x4, No edema    Labs:  CBC:  Recent Labs     09/27/22 2226 09/28/22 0321 09/28/22 0657 09/29/22  0454   WBC 10.2 13.4 11.6  --    RBC 5.31 5.72 5.21  --    HGB 15.8 17.4* 16.0  --    HCT 46.3 50.6* 47.5  --    MCV 87.3 88.5 91.2  --    MCH 29.8 30.4 30.7  --    MCHC 34.1 34.4 33.7  --    RDW 14.3 13.2 13.1  --     219 224  --    MPV  --  10.5 10.4  --    INR  --   --   --  1.1       Chem:  Recent Labs     09/27/22 2226 09/28/22 0321 09/28/22 0657 09/29/22  0454    138  --  139   K 3.8 4.2  --  3.5*    100  --  102   CO2 19* 21  --  21   GLUCOSE 120* 130*  --  100*   BUN 21* 20  --  6   CREATININE 0.55* 0.55*  --  0.53*   MG  --   --  2.1 1.6*   ANIONGAP 17 17  --  16   LABGLOM Pediatric GFR requires additional information. Refer to [facility] hospital website for calculator. Pediatric GFR requires additional information. Refer to Critical access hospital website for calculator. --  Pediatric GFR requires additional information. Refer to Critical access hospital website for calculator. CALCIUM 9.6 9.5  --  8.2*   CAION  --   --   --  1.17   PHOS  --   --   --  2.7     Recent Labs     09/27/22 2226 09/28/22 0321 09/28/22 0657 09/29/22  0454   PROT 7.0 7.3  --  5.6*   LABALBU 4.6 4.8  --  3.7   TSH  --  0.74  --   --    * 151*  --  35   * 397*  --  186*   ALKPHOS 140* 149*  --  107   BILITOT 8.5* 6.7*  --  1.4*   BILIDIR  --   --  2.8*  --    LIPASE >3,000* >3,000*  --  1,848*   TRIG  --   --   --  83       Glucose:No results for input(s): POCGLU in the last 72 hours. ABG:No results found for: POCPH, PHART, PH, POCPCO2, WVH8UEJ, PCO2, POCPO2, PO2ART, PO2, POCHCO3, TQP9ERP, HCO3, NBEA, PBEA, BEART, BE, THGBART, THB, VBT1CHA, NZRO3IRB, O7RKFCPH, O2SAT, FIO2    Radiology:  CT ABDOMEN PELVIS W IV CONTRAST Additional Contrast? None    Result Date: 9/27/2022  1.  Severe uncomplicated acute pancreatitis, not significantly changed from last month's exam. 2. New mild gallbladder distention without cholelithiasis, gallbladder wall thickening or pericholecystic fluid. This may reflect a prolonged interval since the patient's last meal. However, if there is clinical concern for cholecystitis, further evaluation with a nuclear HIDA scan could be considered. 3. New mild extrahepatic biliary ductal dilatation. If clinical or laboratory findings suggest biliary obstruction, further evaluation with MRCP or ERCP could be considered. US GALLBLADDER RUQ    Result Date: 9/28/2022  1. Incompletely seen pancreas with changes of pancreatitis better demonstrated on CT. 2. Cholelithiasis. Mild gallbladder distention can be seen with cholecystitis, but other secondary sonographic findings of cholecystitis are absent. The wall thickening may be reactive to pancreatitis. Consider further evaluation with a nuclear medicine hepatobiliary scan if there are clinical findings of cholecystitis. 3. Minimal extrahepatic biliary dilation. Consider further evaluation with MRCP if there are clinical findings of cholestasis. 4. Trace perihepatic ascites, likely reactive. 5. Mild hepatic steatosis with minimal fatty sparing along the gallbladder fossa. XR CHEST PORTABLE    Result Date: 9/28/2022  No acute intrathoracic process. ASSESSMENT  25years old with history of amphetamine and alcohol abuse presented for gallstone pancreatitis. Problem List  Patient Active Problem List   Diagnosis    Acute pancreatitis, unspecified complication status, unspecified pancreatitis type    Alcohol induced acute pancreatitis without necrosis or infection    Gallstone pancreatitis    Pain of upper abdomen    Elevated LFTs    Acute pancreatitis without infection or necrosis, unspecified pancreatitis type       PLAN  Continue medical mgmt and supportive care per primary  Diet: NPO  Antibiotic: Clindamycin  IVF hydration   Consult to cardiology for afib: Heparin, amiodarone, diltiazem, f/u echo  Ultrasound shows cholelithiasis and dilated extrahepatic bile duct. Pending MRCP.   Will follow, likely

## 2022-09-29 NOTE — PROGRESS NOTES
[facility]  Progress Note                   Date:   9/29/2022  Patient name: Estiven Welsh  Date of admission:  9/28/2022  2:58 AM  MRN:   [id number]  YOB: 2000  PCP: CHAPO Rocha    Reason for Admission: Acute pancreatitis, unspecified complication status, unspecified pancreatitis type [K85.90]  Acute pancreatitis without infection or necrosis, unspecified pancreatitis type [K85.90]    Subjective:       Clinical Changes /Abnormalities: Pt. Seen & examined in room. No acute CV issues/concerns overnight. Labs, vitals, & tele reviewed. Remains SR 80s    Review of Systems    Medications:   Scheduled Meds:   nicotine  1 patch TransDERmal Daily    clindamycin (CLEOCIN) IV  600 mg IntraVENous Q8H    magnesium sulfate  2,000 mg IntraVENous Once    sodium chloride flush  5-40 mL IntraVENous 2 times per day    aspirin  324 mg Oral Daily     Continuous Infusions:   lactated ringers 150 mL/hr at 09/29/22 0440    sodium chloride      dilTIAZem Stopped (09/28/22 1900)    heparin (PORCINE) Infusion 20 Units/kg/hr (09/29/22 0906)    amiodarone 0.5 mg/min (09/28/22 1351)     CBC:   Recent Labs     09/27/22 2226 09/28/22 0321 09/28/22  0657   WBC 10.2 13.4 11.6   HGB 15.8 17.4* 16.0    219 224     BMP:    Recent Labs     09/27/22 2226 09/28/22 0321 09/29/22  0454    138 139   K 3.8 4.2 3.5*    100 102   CO2 19* 21 21   BUN 21* 20 6   CREATININE 0.55* 0.55* 0.53*   GLUCOSE 120* 130* 100*     Hepatic:  Recent Labs     09/27/22 2226 09/28/22  0321 09/29/22  0454   * 151* 35   * 397* 186*   BILITOT 8.5* 6.7* 1.4*   ALKPHOS 140* 149* 107     Troponin: No results for input(s): TROPHS in the last 72 hours. BNP: No results for input(s): BNP in the last 72 hours. Lipids: No results for input(s): CHOL, HDL in the last 72 hours.     Invalid input(s): LDLCALCU  INR:   Recent Labs     09/29/22  0454   INR 1.1       Objective:   Vitals: /74   Pulse 85 Temp 97.9 °F (36.6 °C) (Axillary)   Resp 18   Wt 206 lb 12.7 oz (93.8 kg)   SpO2 96%   BMI 26.55 kg/m²   General appearance: alert and cooperative with exam  HEENT: Head: Normocephalic, no lesions, without obvious abnormality. Neck:no JVD, trachea midline, no adenopathy  Lungs: Clear to auscultation throughout  Heart: Regular rate and rhythm, s1/s2 auscultated, no murmurs, SR  Abdomen: soft, non-tender, bowel sounds active  Extremities: no edema  Neurologic: not done        Assessment / Acute Cardiac Problems:   New onset A. Fib likely Alcohol and Amphetamine induced. Acute pancreatitis  Alcohol abuse  Illicit drug abuse -methamphetamine  Abdominal pain  Elevated LFTs    Patient Active Problem List:     Acute pancreatitis, unspecified complication status, unspecified pancreatitis type     Alcohol induced acute pancreatitis without necrosis or infection     Gallstone pancreatitis     Pain of upper abdomen     Elevated LFTs     Acute pancreatitis without infection or necrosis, unspecified pancreatitis type    [id number] Score for Atrial Fibrillation Stroke Risk   Risk   Factors  Component Value   C CHF No 0   H HTN No 0   A2 Age >= 75 No,  (24 y.o.) 0   D DM No 0   S2 Prior Stroke/TIA No 0   V Vascular Disease No 0   A Age 74-69 No,  (24 y.o.) 0   Sc Sex male 0    [id number]  Score  0   Score last updated 9/29/22 61:74 AM EDT    Click here for a link to the UpToDate guideline \"Atrial Fibrillation: Anticoagulation therapy to prevent embolization    Disclaimer: Risk Score calculation is dependent on accuracy of patient problem list and past encounter diagnosis. Plan of Treatment:   Stable and remains SR. Will DC Amio and Cardizem gtts and switch to oral Cardizem. Continue Heparin gtt pending echo findings. If low risk will be OK to stop Heparin. No need for [facility] if low risk given [id number] 0  Acute pancreatitis & cholelithiasis per primary/surgery.  Pre-op clearance pending echo    Electronically signed by ANGEL Dunn - CNP on 9/29/2022 at 10:14 AM  [address]  [phone]

## 2022-09-29 NOTE — PROGRESS NOTES
General Surgery:  Daily Progress Note                    PATIENT NAME: Tati Ochoa     TODAY'S DATE: 9/29/2022, 5:19 AM  CC:  24 y/o male c/o RUQ and RLQ abdominal pain for the past 4 days    SUBJECTIVE:     Pt seen and examined at bedside this AM. Pt is in acute distress with RUQ and RLQ pain for the last 4 days. The pain is said to be sharp and constant. The only thing that makes it better is pain meds and moving makes the pain worse. Pt said pain is 8/10 and has been the same since it started. Denies fever, chills, chest pain, and SOB but has vomited 3x overnight. Has no BM since pain started. Notable for small amount of green bile. No jaundice present. Pt is still in pain and asked for more pain meds. Pt was in Afib last night, was given Amiodorone, Diltiazem, and was started on Heparin drip. Was also given Fentanyl and Dilaudid but did not tolerate well. Pt said he was nauseous and threw up and made his stomach hurt. Was switched to Toradol and is tolerating well, no n/v symptoms. Now, patient is in Sinus tachycardia rhythm. Urine Toxicology results showed positive for methamphetamine, opiates and marijuana. Recent lab report today showed high lipase of 1848 U/L downtrend from 3000 U/L yesterday and high ALT of 186 U/L downtrend from 397 U/L yesterday. AST yesterday was 151 U/L but today its normal of 35 U/L. Pt states overall pain is 8/10 rated for RUQ and RLQ pain. C/O RUQ and RLQ pain for the past 4 days. Negative for jaundice. Denies fever, chills, nausea, chest pain, and SOB. Positive for vomiting 3x last night, was noted it was a small amount of green bile. NPO. No Activity, patient states moving causes pain. No flatus, No BM.       OBJECTIVE:   VITALS:  BP (!) 143/83   Pulse 74   Temp 98.2 °F (36.8 °C) (Axillary)   Resp 20   Wt 206 lb 12.7 oz (93.8 kg)   SpO2 98%   BMI 26.55 kg/m²      INTAKE/OUTPUT:      Intake/Output Summary (Last 24 hours) at 9/29/2022 8122  Last data filed at 9/29/2022 0444  Gross per 24 hour   Intake 5495.43 ml   Output 2450 ml   Net 3045.43 ml       PHYSICAL EXAM:  General Appearance: in mild distress and anxious affect  No NGT/OGT  Heart: Tachycardic, no murmur present. Denies chest pain. Lungs: Normal expansion. Abdomen: Abdominal tenderness with pain in RUQ, mild guarding present more pronounced in upper abdomen, hypoactive bowel sounds present throughout abdomen. Extremities: No cyanosis, pitting edema, rashes noted. IV Access:  Amiodarone 150 mg in dextrose 5% 100 mL bolus. Clindamycin 600 mg in dextrose 5% 50 mL IVPB. Diltiazem 125 mg dextrose 5% 125 mL infusion. Ortiz:  None      Data:  CBC with Differential:    Lab Results   Component Value Date/Time    WBC 11.6 09/28/2022 06:57 AM    RBC 5.21 09/28/2022 06:57 AM    HGB 16.0 09/28/2022 06:57 AM    HCT 47.5 09/28/2022 06:57 AM     09/28/2022 06:57 AM    MCV 91.2 09/28/2022 06:57 AM    MCH 30.7 09/28/2022 06:57 AM    MCHC 33.7 09/28/2022 06:57 AM    RDW 13.1 09/28/2022 06:57 AM    LYMPHOPCT 6 09/28/2022 03:21 AM    MONOPCT 4 09/28/2022 03:21 AM    BASOPCT 0 09/28/2022 03:21 AM    MONOSABS 0.54 09/28/2022 03:21 AM    LYMPHSABS 0.82 09/28/2022 03:21 AM    EOSABS <0.03 09/28/2022 03:21 AM    BASOSABS 0.03 09/28/2022 03:21 AM    DIFFTYPE YES 09/27/2022 10:26 PM     BMP:    Lab Results   Component Value Date/Time     09/29/2022 04:54 AM    K 3.5 09/29/2022 04:54 AM     09/29/2022 04:54 AM    CO2 21 09/29/2022 04:54 AM    BUN 6 09/29/2022 04:54 AM    LABALBU 3.7 09/29/2022 04:54 AM    CREATININE 0.53 09/29/2022 04:54 AM    CALCIUM 8.2 09/29/2022 04:54 AM    GFRAA NOT REPORTED 02/01/2022 09:58 PM    LABGLOM  09/29/2022 04:54 AM     Pediatric GFR requires additional information. Refer to [facility] website for calculator.     GLUCOSE 100 09/29/2022 04:54 AM     Potassium:    Lab Results   Component Value Date/Time    K 3.5 09/29/2022 04:54 AM     Hepatic Function Panel:    Lab Results Component Value Date/Time    ALKPHOS 107 09/29/2022 04:54 AM     09/29/2022 04:54 AM    AST 35 09/29/2022 04:54 AM    PROT 5.6 09/29/2022 04:54 AM    BILITOT 1.4 09/29/2022 04:54 AM    BILIDIR 2.8 09/28/2022 06:57 AM    IBILI 0.38 02/01/2022 09:58 PM    LABALBU 3.7 09/29/2022 04:54 AM     Albumin:    Lab Results   Component Value Date/Time    LABALBU 3.7 09/29/2022 04:54 AM     Magnesium:    Lab Results   Component Value Date/Time    MG 1.6 09/29/2022 04:54 AM     Phosphorus:    Lab Results   Component Value Date/Time    PHOS 2.7 09/29/2022 04:54 AM     PT/INR:    Lab Results   Component Value Date/Time    PROTIME 11.2 09/29/2022 04:54 AM    INR 1.1 09/29/2022 04:54 AM     PTT:    Lab Results   Component Value Date/Time    APTT 31.8 09/28/2022 11:53 PM   [APTT}    Radiology Review:  RUQ U/S done 9/28 showed Cholelithiasis, mild gallbladder distention with cholecystitis, extrahepatic biliary dilatation and mild hepatic steatosis. ASSESSMENT:  Active Hospital Problems    Diagnosis Date Noted    Acute pancreatitis without infection or necrosis, unspecified pancreatitis type [K85.90] 09/28/2022     Priority: Medium    Acute pancreatitis, unspecified complication status, unspecified pancreatitis type [K85.90] 08/13/2022     Priority: Medium       25 y.o. male with RUQ and RLQ pain for the past 4 days showing concern for pancreatitis attributed to gallstones, alcohol abuse and methamphetamine abuse.        Plan:  Diet: continue NPO  Analgesia:  continue to give Toradol for pain control  Schedule and get consent for MRCP after patient is stable  Pending MRCP results, plan possible cholecystectomy   Consider cardioversion if back in Afib  Monitor Hepatic function panel, Lipase, aPTT  Continue IV fluid management    Electronically signed by Thomas Carias  on 9/29/2022 at 5:19 AM

## 2022-09-30 ENCOUNTER — APPOINTMENT (OUTPATIENT)
Dept: MRI IMAGING | Age: 22
DRG: 263 | End: 2022-09-30
Payer: MEDICAID

## 2022-09-30 PROBLEM — K80.21 CALCULUS OF GALLBLADDER WITH BILIARY OBSTRUCTION BUT WITHOUT CHOLECYSTITIS: Status: ACTIVE | Noted: 2022-09-30

## 2022-09-30 LAB
ABSOLUTE EOS #: 0.1 K/UL (ref 0–0.44)
ABSOLUTE IMMATURE GRANULOCYTE: 0.05 K/UL (ref 0–0.3)
ABSOLUTE LYMPH #: 1.91 K/UL (ref 1.2–5.2)
ABSOLUTE MONO #: 0.75 K/UL (ref 0.1–1.4)
ALBUMIN SERPL-MCNC: 3.5 G/DL (ref 3.5–5.2)
ALBUMIN/GLOBULIN RATIO: 1.3 (ref 1–2.5)
ALP BLD-CCNC: 101 U/L (ref 40–129)
ALT SERPL-CCNC: 118 U/L (ref 5–41)
ANION GAP SERPL CALCULATED.3IONS-SCNC: 10 MMOL/L (ref 9–17)
AST SERPL-CCNC: 22 U/L
BASOPHILS # BLD: 0 % (ref 0–2)
BASOPHILS ABSOLUTE: 0.04 K/UL (ref 0–0.2)
BILIRUB SERPL-MCNC: 1.3 MG/DL (ref 0.3–1.2)
BUN BLDV-MCNC: 5 MG/DL (ref 6–20)
CALCIUM SERPL-MCNC: 8.6 MG/DL (ref 8.6–10.4)
CHLORIDE BLD-SCNC: 101 MMOL/L (ref 98–107)
CO2: 26 MMOL/L (ref 20–31)
CREAT SERPL-MCNC: 0.41 MG/DL (ref 0.7–1.2)
EOSINOPHILS RELATIVE PERCENT: 1 % (ref 1–4)
GFR NON-AFRICAN AMERICAN: ABNORMAL ML/MIN
GFR SERPL CREATININE-BSD FRML MDRD: ABNORMAL ML/MIN/{1.73_M2}
GLUCOSE BLD-MCNC: 73 MG/DL (ref 70–99)
HCT VFR BLD CALC: 41.4 % (ref 40.7–50.3)
HEMOGLOBIN: 14.1 G/DL (ref 13–17)
IMMATURE GRANULOCYTES: 1 %
LIPASE: 431 U/L (ref 13–60)
LYMPHOCYTES # BLD: 19 % (ref 25–45)
MCH RBC QN AUTO: 30 PG (ref 25.2–33.5)
MCHC RBC AUTO-ENTMCNC: 34.1 G/DL (ref 28.4–34.8)
MCV RBC AUTO: 88.1 FL (ref 82.6–102.9)
MONOCYTES # BLD: 7 % (ref 2–8)
NRBC AUTOMATED: 0 PER 100 WBC
PARTIAL THROMBOPLASTIN TIME: 49.8 SEC (ref 20.5–30.5)
PDW BLD-RTO: 12.9 % (ref 11.8–14.4)
PLATELET # BLD: ABNORMAL K/UL (ref 138–453)
PLATELET, FLUORESCENCE: 146 K/UL (ref 138–453)
PLATELET, IMMATURE FRACTION: 3.6 % (ref 1.1–10.3)
POTASSIUM SERPL-SCNC: 3.8 MMOL/L (ref 3.7–5.3)
RBC # BLD: 4.7 M/UL (ref 4.21–5.77)
SEG NEUTROPHILS: 72 % (ref 34–64)
SEGMENTED NEUTROPHILS ABSOLUTE COUNT: 7.25 K/UL (ref 1.8–8)
SODIUM BLD-SCNC: 137 MMOL/L (ref 135–144)
TOTAL PROTEIN: 6.3 G/DL (ref 6.4–8.3)
WBC # BLD: 10.1 K/UL (ref 4.5–13.5)

## 2022-09-30 PROCEDURE — 2580000003 HC RX 258

## 2022-09-30 PROCEDURE — 85055 RETICULATED PLATELET ASSAY: CPT

## 2022-09-30 PROCEDURE — 2500000003 HC RX 250 WO HCPCS

## 2022-09-30 PROCEDURE — 74183 MRI ABD W/O CNTR FLWD CNTR: CPT

## 2022-09-30 PROCEDURE — 6370000000 HC RX 637 (ALT 250 FOR IP): Performed by: NURSE PRACTITIONER

## 2022-09-30 PROCEDURE — 6360000004 HC RX CONTRAST MEDICATION

## 2022-09-30 PROCEDURE — 1200000000 HC SEMI PRIVATE

## 2022-09-30 PROCEDURE — 80053 COMPREHEN METABOLIC PANEL: CPT

## 2022-09-30 PROCEDURE — C9113 INJ PANTOPRAZOLE SODIUM, VIA: HCPCS | Performed by: NURSE PRACTITIONER

## 2022-09-30 PROCEDURE — 6360000002 HC RX W HCPCS: Performed by: NURSE PRACTITIONER

## 2022-09-30 PROCEDURE — 36415 COLL VENOUS BLD VENIPUNCTURE: CPT

## 2022-09-30 PROCEDURE — 2580000003 HC RX 258: Performed by: NURSE PRACTITIONER

## 2022-09-30 PROCEDURE — A4216 STERILE WATER/SALINE, 10 ML: HCPCS | Performed by: NURSE PRACTITIONER

## 2022-09-30 PROCEDURE — 99232 SBSQ HOSP IP/OBS MODERATE 35: CPT | Performed by: INTERNAL MEDICINE

## 2022-09-30 PROCEDURE — 94761 N-INVAS EAR/PLS OXIMETRY MLT: CPT

## 2022-09-30 PROCEDURE — 99232 SBSQ HOSP IP/OBS MODERATE 35: CPT | Performed by: FAMILY MEDICINE

## 2022-09-30 PROCEDURE — 83690 ASSAY OF LIPASE: CPT

## 2022-09-30 PROCEDURE — 6370000000 HC RX 637 (ALT 250 FOR IP): Performed by: INTERNAL MEDICINE

## 2022-09-30 PROCEDURE — A9579 GAD-BASE MR CONTRAST NOS,1ML: HCPCS

## 2022-09-30 PROCEDURE — 85025 COMPLETE CBC W/AUTO DIFF WBC: CPT

## 2022-09-30 PROCEDURE — 85730 THROMBOPLASTIN TIME PARTIAL: CPT

## 2022-09-30 PROCEDURE — 6360000002 HC RX W HCPCS: Performed by: INTERNAL MEDICINE

## 2022-09-30 RX ORDER — SODIUM CHLORIDE 0.9 % (FLUSH) 0.9 %
10 SYRINGE (ML) INJECTION PRN
Status: DISCONTINUED | OUTPATIENT
Start: 2022-09-30 | End: 2022-10-02 | Stop reason: HOSPADM

## 2022-09-30 RX ORDER — 0.9 % SODIUM CHLORIDE 0.9 %
20 INTRAVENOUS SOLUTION INTRAVENOUS ONCE
Status: COMPLETED | OUTPATIENT
Start: 2022-09-30 | End: 2022-09-30

## 2022-09-30 RX ADMIN — SODIUM CHLORIDE, POTASSIUM CHLORIDE, SODIUM LACTATE AND CALCIUM CHLORIDE: 600; 310; 30; 20 INJECTION, SOLUTION INTRAVENOUS at 02:09

## 2022-09-30 RX ADMIN — CLINDAMYCIN IN 5 PERCENT DEXTROSE 600 MG: 12 INJECTION, SOLUTION INTRAVENOUS at 08:41

## 2022-09-30 RX ADMIN — HYDROMORPHONE HYDROCHLORIDE 0.5 MG: 1 INJECTION, SOLUTION INTRAMUSCULAR; INTRAVENOUS; SUBCUTANEOUS at 23:30

## 2022-09-30 RX ADMIN — GADOTERIDOL 18 ML: 279.3 INJECTION, SOLUTION INTRAVENOUS at 11:26

## 2022-09-30 RX ADMIN — HYDROMORPHONE HYDROCHLORIDE 0.5 MG: 1 INJECTION, SOLUTION INTRAMUSCULAR; INTRAVENOUS; SUBCUTANEOUS at 15:34

## 2022-09-30 RX ADMIN — TRAMADOL HYDROCHLORIDE 50 MG: 50 TABLET, COATED ORAL at 06:15

## 2022-09-30 RX ADMIN — HYDROMORPHONE HYDROCHLORIDE 0.5 MG: 1 INJECTION, SOLUTION INTRAMUSCULAR; INTRAVENOUS; SUBCUTANEOUS at 20:29

## 2022-09-30 RX ADMIN — SODIUM CHLORIDE, PRESERVATIVE FREE 40 MG: 5 INJECTION INTRAVENOUS at 08:40

## 2022-09-30 RX ADMIN — CLINDAMYCIN IN 5 PERCENT DEXTROSE 600 MG: 12 INJECTION, SOLUTION INTRAVENOUS at 00:29

## 2022-09-30 RX ADMIN — DILTIAZEM HYDROCHLORIDE 120 MG: 120 CAPSULE, COATED, EXTENDED RELEASE ORAL at 08:40

## 2022-09-30 RX ADMIN — TRAMADOL HYDROCHLORIDE 50 MG: 50 TABLET, COATED ORAL at 20:29

## 2022-09-30 RX ADMIN — SODIUM CHLORIDE, PRESERVATIVE FREE 10 ML: 5 INJECTION INTRAVENOUS at 20:30

## 2022-09-30 RX ADMIN — HYDROMORPHONE HYDROCHLORIDE 0.5 MG: 1 INJECTION, SOLUTION INTRAMUSCULAR; INTRAVENOUS; SUBCUTANEOUS at 05:06

## 2022-09-30 RX ADMIN — HYDROMORPHONE HYDROCHLORIDE 0.5 MG: 1 INJECTION, SOLUTION INTRAMUSCULAR; INTRAVENOUS; SUBCUTANEOUS at 02:09

## 2022-09-30 RX ADMIN — HEPARIN SODIUM 2000 UNITS: 1000 INJECTION INTRAVENOUS; SUBCUTANEOUS at 06:19

## 2022-09-30 RX ADMIN — CLINDAMYCIN IN 5 PERCENT DEXTROSE 600 MG: 12 INJECTION, SOLUTION INTRAVENOUS at 15:34

## 2022-09-30 RX ADMIN — HYDROMORPHONE HYDROCHLORIDE 0.5 MG: 1 INJECTION, SOLUTION INTRAMUSCULAR; INTRAVENOUS; SUBCUTANEOUS at 09:00

## 2022-09-30 RX ADMIN — ASPIRIN 81 MG 324 MG: 81 TABLET ORAL at 08:39

## 2022-09-30 RX ADMIN — HYDROMORPHONE HYDROCHLORIDE 0.5 MG: 1 INJECTION, SOLUTION INTRAMUSCULAR; INTRAVENOUS; SUBCUTANEOUS at 12:27

## 2022-09-30 RX ADMIN — SODIUM CHLORIDE 20 ML: 0.9 INJECTION, SOLUTION INTRAVENOUS at 12:38

## 2022-09-30 RX ADMIN — TRAMADOL HYDROCHLORIDE 50 MG: 50 TABLET, COATED ORAL at 02:09

## 2022-09-30 ASSESSMENT — PAIN DESCRIPTION - LOCATION
LOCATION: ABDOMEN
LOCATION: ABDOMEN;BACK
LOCATION: ABDOMEN
LOCATION: ABDOMEN;BACK

## 2022-09-30 ASSESSMENT — PAIN SCALES - GENERAL
PAINLEVEL_OUTOF10: 7
PAINLEVEL_OUTOF10: 8
PAINLEVEL_OUTOF10: 8
PAINLEVEL_OUTOF10: 7
PAINLEVEL_OUTOF10: 9
PAINLEVEL_OUTOF10: 7
PAINLEVEL_OUTOF10: 0
PAINLEVEL_OUTOF10: 7
PAINLEVEL_OUTOF10: 2
PAINLEVEL_OUTOF10: 7
PAINLEVEL_OUTOF10: 10
PAINLEVEL_OUTOF10: 8
PAINLEVEL_OUTOF10: 1
PAINLEVEL_OUTOF10: 10
PAINLEVEL_OUTOF10: 10

## 2022-09-30 ASSESSMENT — ENCOUNTER SYMPTOMS
ABDOMINAL PAIN: 1
NAUSEA: 1
DIARRHEA: 0
CHEST TIGHTNESS: 0
CONSTIPATION: 0
COUGH: 0
SHORTNESS OF BREATH: 0
VOMITING: 1
BLOOD IN STOOL: 0
WHEEZING: 0

## 2022-09-30 ASSESSMENT — PAIN DESCRIPTION - ORIENTATION
ORIENTATION: LEFT
ORIENTATION: LEFT

## 2022-09-30 NOTE — PROGRESS NOTES
General Surgery:  Daily Progress Note            PATIENT NAME: Max Ochoa     TODAY'S DATE: 9/30/2022, 7:12 AM  CC:  Pt c/o of periumbilical pain. SUBJECTIVE:     Pt seen and examined at bedside. He was in Afib 2 days ago but today is still in sinus rhythm with a heart rate of 87. Heart rate and rhythm is controlled this morning. No BM yet but he is passing gas. Pt has pain in periumbilical region B/L, pain is characterized as achey and at a 8/10. same as yesterday. Family was concerned Toradol was causing gastric ulcer so it's been switched to Tramadol, tolerating well with no n/v. Diet: NPO. Denies fever, chills, chest pain, SOB and N/V. ECHO results were normal.    Tmax 97.6 F    Pt states overall pain is 8/10. C/O periumbilical pain. Denies fever, chills, nausea, vomiting, chest pain, and SOB. Diet: NPO. No Activity. Passing flatus, No BM. OBJECTIVE:   VITALS:  /71   Pulse 77   Temp 97.6 °F (36.4 °C) (Axillary)   Resp 14   Wt 206 lb 12.7 oz (93.8 kg)   SpO2 95%   BMI 26.55 kg/m²      INTAKE/OUTPUT:      Intake/Output Summary (Last 24 hours) at 9/30/2022 6130  Last data filed at 9/30/2022 1732  Gross per 24 hour   Intake 2975 ml   Output 2550 ml   Net 425 ml       PHYSICAL EXAM:  General Appearance: in no acute distress, resting comfortably in bed  Heart: Heart sounds are normal.  Regular rate and rhythm. Lungs: Normal expansion. Abdomen: Tenderness in periumbilical region, no rebound or guarding. No distention, normoactive bowel sounds. Positive [place] sign. Extremities: Moves all extremities x4.      IV Access: Same as yesterday  Ortiz: None      Data:  CBC with Differential:    Lab Results   Component Value Date/Time    WBC 11.6 09/28/2022 06:57 AM    RBC 5.21 09/28/2022 06:57 AM    HGB 16.0 09/28/2022 06:57 AM    HCT 47.5 09/28/2022 06:57 AM     09/28/2022 06:57 AM    MCV 91.2 09/28/2022 06:57 AM    MCH 30.7 09/28/2022 06:57 AM    MCHC 33.7 09/28/2022 06:57 AM    RDW 13.1 09/28/2022 06:57 AM    LYMPHOPCT 6 09/28/2022 03:21 AM    MONOPCT 4 09/28/2022 03:21 AM    BASOPCT 0 09/28/2022 03:21 AM    MONOSABS 0.54 09/28/2022 03:21 AM    LYMPHSABS 0.82 09/28/2022 03:21 AM    EOSABS <0.03 09/28/2022 03:21 AM    BASOSABS 0.03 09/28/2022 03:21 AM    DIFFTYPE YES 09/27/2022 10:26 PM     CMP:    Lab Results   Component Value Date/Time     09/29/2022 04:54 AM    K 3.5 09/29/2022 04:54 AM     09/29/2022 04:54 AM    CO2 21 09/29/2022 04:54 AM    BUN 6 09/29/2022 04:54 AM    CREATININE 0.53 09/29/2022 04:54 AM    GFRAA NOT REPORTED 02/01/2022 09:58 PM    LABGLOM  09/29/2022 04:54 AM     Pediatric GFR requires additional information. Refer to [facility] website for calculator. GLUCOSE 100 09/29/2022 04:54 AM    PROT 5.6 09/29/2022 04:54 AM    LABALBU 3.7 09/29/2022 04:54 AM    CALCIUM 8.2 09/29/2022 04:54 AM    BILITOT 1.4 09/29/2022 04:54 AM    ALKPHOS 107 09/29/2022 04:54 AM    AST 35 09/29/2022 04:54 AM     09/29/2022 04:54 AM     BMP:    Lab Results   Component Value Date/Time     09/29/2022 04:54 AM    K 3.5 09/29/2022 04:54 AM     09/29/2022 04:54 AM    CO2 21 09/29/2022 04:54 AM    BUN 6 09/29/2022 04:54 AM    LABALBU 3.7 09/29/2022 04:54 AM    CREATININE 0.53 09/29/2022 04:54 AM    CALCIUM 8.2 09/29/2022 04:54 AM    GFRAA NOT REPORTED 02/01/2022 09:58 PM    LABGLOM  09/29/2022 04:54 AM     Pediatric GFR requires additional information. Refer to [facility] website for calculator.     GLUCOSE 100 09/29/2022 04:54 AM     Hepatic Function Panel:    Lab Results   Component Value Date/Time    ALKPHOS 107 09/29/2022 04:54 AM     09/29/2022 04:54 AM    AST 35 09/29/2022 04:54 AM    PROT 5.6 09/29/2022 04:54 AM    BILITOT 1.4 09/29/2022 04:54 AM    BILIDIR 2.8 09/28/2022 06:57 AM    IBILI 0.38 02/01/2022 09:58 PM    LABALBU 3.7 09/29/2022 04:54 AM     Albumin:    Lab Results   Component Value Date/Time    LABALBU 3.7 09/29/2022 04:54 AM     PT/INR: Lab Results   Component Value Date/Time    PROTIME 11.2 09/29/2022 04:54 AM    INR 1.1 09/29/2022 04:54 AM     PTT:    Lab Results   Component Value Date/Time    APTT 49.8 09/30/2022 05:05 AM   [APTT}  LIPASE:    Lab Results   Component Value Date/Time    LIPASE 1,848 09/29/2022 04:54 AM       Radiology Review:   CT ABDOMEN PELVIS W IV CONTRAST Additional Contrast? None     Result Date: 9/27/2022  1. Severe uncomplicated acute pancreatitis, not significantly changed from last month's exam. 2. New mild gallbladder distention without cholelithiasis, gallbladder wall thickening or pericholecystic fluid. This may reflect a prolonged interval since the patient's last meal. However, if there is clinical concern for cholecystitis, further evaluation with a nuclear HIDA scan could be considered. 3. New mild extrahepatic biliary ductal dilatation. If clinical or laboratory findings suggest biliary obstruction, further evaluation with MRCP or ERCP could be considered. US GALLBLADDER RUQ     Result Date: 9/28/2022  1. Incompletely seen pancreas with changes of pancreatitis better demonstrated on CT. 2. Cholelithiasis. Mild gallbladder distention can be seen with cholecystitis, but other secondary sonographic findings of cholecystitis are absent. The wall thickening may be reactive to pancreatitis. Consider further evaluation with a nuclear medicine hepatobiliary scan if there are clinical findings of cholecystitis. 3. Minimal extrahepatic biliary dilation. Consider further evaluation with MRCP if there are clinical findings of cholestasis. 4. Trace perihepatic ascites, likely reactive. 5. Mild hepatic steatosis with minimal fatty sparing along the gallbladder fossa. XR CHEST PORTABLE     Result Date: 9/28/2022  No acute intrathoracic process.    ASSESSMENT:  Active Hospital Problems    Diagnosis Date Noted    Acute pancreatitis without infection or necrosis, unspecified pancreatitis type [K85.90] 09/28/2022 Priority: Medium    Acute pancreatitis, unspecified complication status, unspecified pancreatitis type [K85.90] 08/13/2022     Priority: Medium       25 y.o. male with with history of amphetamine and alcohol abuse presented for gallstone pancreatitis. Plan:  Diet: NPO  Analgesia: Continue on Tramadol Q4H PRN  Clindamycin  IVF hydration  Follow up MRCP. Further management pending results  Remainder of care and management per primary team    Electronically signed by Adrianna Pearson MD  on 9/30/2022 at 7:12 AM     I have seen and evaluated this patient. I agree with the findings and plan as detailed above.     Antionette Singh MD  General Surgery PGY5  Available via [address] [place] [address]

## 2022-09-30 NOTE — PROGRESS NOTES
[facility]  Progress Note                   Date:   9/30/2022  Patient name: Anne Ro  Date of admission:  9/28/2022  2:58 AM  MRN:   [id number]  YOB: 2000  PCP: CHAPO Asif    Reason for Admission: Acute pancreatitis, unspecified complication status, unspecified pancreatitis type [K85.90]  Acute pancreatitis without infection or necrosis, unspecified pancreatitis type [K85.90]    Subjective:       Clinical Changes /Abnormalities: Pt. Seen & examined in room after discussion with RN. No acute CV issues/concerns overnight. Labs, vitals, & tele reviewed. Remains SR 86. Heparin stopped by primary as echo stable. Review of Systems    Medications:   Scheduled Meds:   nicotine  1 patch TransDERmal Daily    clindamycin (CLEOCIN) IV  600 mg IntraVENous Q8H    dilTIAZem  120 mg Oral Daily    pantoprazole (PROTONIX) 40 mg injection  40 mg IntraVENous Daily    sodium chloride flush  5-40 mL IntraVENous 2 times per day    aspirin  324 mg Oral Daily     Continuous Infusions:   lactated ringers 150 mL/hr at 09/30/22 0209    sodium chloride       CBC:   Recent Labs     09/27/22 2226 09/28/22 0321 09/28/22  0657   WBC 10.2 13.4 11.6   HGB 15.8 17.4* 16.0    219 224       BMP:    Recent Labs     09/27/22 2226 09/28/22 0321 09/29/22  0454    138 139   K 3.8 4.2 3.5*    100 102   CO2 19* 21 21   BUN 21* 20 6   CREATININE 0.55* 0.55* 0.53*   GLUCOSE 120* 130* 100*       Hepatic:  Recent Labs     09/27/22 2226 09/28/22 0321 09/29/22  0454   * 151* 35   * 397* 186*   BILITOT 8.5* 6.7* 1.4*   ALKPHOS 140* 149* 107       Troponin: No results for input(s): TROPHS in the last 72 hours. BNP: No results for input(s): BNP in the last 72 hours. Lipids: No results for input(s): CHOL, HDL in the last 72 hours.     Invalid input(s): LDLCALCU  INR:   Recent Labs     09/29/22  0454   INR 1.1         Objective:   Vitals: /74   Pulse 79   Temp 98.2 °F (36.8 °C) (Axillary)   Resp 16   Wt 206 lb 12.7 oz (93.8 kg)   SpO2 96%   BMI 26.55 kg/m²   General appearance: alert and cooperative with exam  HEENT: Head: Normocephalic, no lesions, without obvious abnormality. Neck:no JVD, trachea midline, no adenopathy  Lungs: Clear to auscultation throughout  Heart: Regular rate and rhythm, s1/s2 auscultated, no murmurs, SR  Abdomen: soft, non-tender, bowel sounds active  Extremities: no edema  Neurologic: not done    Echo 9/29/22  Summary   Normal LV size and wall thickness. No obvious wall motion abnormality seen. Normal LV systolic function with LVEF >55%. Normal RV size and function. LA and RA appears normal in size. No obvious significant structural valvular abnormality noted. No significant valvular stenosis or regurgitation noted. Normal aortic root dimension. No significant pericardial effusion noted. No obvious intra-cardiac mass or shunt noted. IVC normal diameter , difficult to assess respiratory variation. Assessment / Acute Cardiac Problems:   New onset A. Fib likely Alcohol and Amphetamine induced.    Acute pancreatitis  Alcohol abuse  Illicit drug abuse -methamphetamine  Abdominal pain  Elevated LFTs    Patient Active Problem List:     Acute pancreatitis, unspecified complication status, unspecified pancreatitis type     Alcohol induced acute pancreatitis without necrosis or infection     Gallstone pancreatitis     Pain of upper abdomen     Elevated LFTs     Acute pancreatitis without infection or necrosis, unspecified pancreatitis type    [id number] Score for Atrial Fibrillation Stroke Risk   Risk   Factors  Component Value   C CHF No 0   H HTN No 0   A2 Age >= 75 No,  (24 y.o.) 0   D DM No 0   S2 Prior Stroke/TIA No 0   V Vascular Disease No 0   A Age 74-69 No,  (24 y.o.) 0   Sc Sex male 0    [id number]  Score  0   Score last updated 9/29/22 63:09 AM EDT    Click here for a link to the UpToDate guideline \"Atrial Fibrillation: Anticoagulation therapy to prevent embolization    Disclaimer: Risk Score calculation is dependent on accuracy of patient problem list and past encounter diagnosis. Plan of Treatment:   Stable and remains SR. Continue PO Cardizem. [facility] if low risk given [id number] 0  Echo as above. No further ischemic work-up at this time. Acute pancreatitis & cholelithiasis per primary/surgery. For MRCP today. Low risk from CV standpoint. Will sign off. Please call with further questions/concerns.      Electronically signed by ANGEL Bales CNP on 9/30/2022 at 9:13 AM  [address]  [phone]

## 2022-09-30 NOTE — PROGRESS NOTES
SPIRITUAL CARE DEPARTMENT - [facility]  PROGRESS NOTE    Shift date: 09/29/22  Shift day: Thursday   Shift # 2    Room # 2579/8048-86   Name: Karo Hoffman                Rastafarian:    Place of Zoroastrianism:     Referral:  Spoke with Family in [address] [place] [address] 1 Date & Time: 9/28/2022  2:58 AM    Assessment:  Karo Hoffman is a 25 y.o. male       Intervention:  Writer introduced self and title as .  met parent in [facility] who engaged in conversation about patients health and its impact. Parent expressed gratitude for being at home away from home. Writer offered space for parent  to express feelings, needs, and concerns and provided a ministry presence. Parent discussed loss in her life and the impact it had. Outcome:  Parent continues to process patients health and medical stay. She expressed gratitude for  stopping and talking. Plan:  Chaplains will remain available to offer spiritual and emotional support as needed.       Electronically signed by Bianca Jay on 9/29/2022 at 11:28 PM.  Matt Loving  [phone]

## 2022-09-30 NOTE — PROGRESS NOTES
Admitting team perfectserved the following @ 2006:    Maday Manuel! After giving a dose of toradol, family is stating that the pt is not to have toradol due to gastric ulcer. Can you give me clarification in any change in orders?       See changes in orders

## 2022-09-30 NOTE — PROGRESS NOTES
[facility]  Office: [address], DO, Kiera Clay, DO, Stephanie Junior, DO, Harpreet Storyars Blood, DO, Crystal Martins MD, Steven Ladd MD, Isabella Robles MD, Bandar Lorenzo MD,  Emilia Alejandro MD, Jamilah Hernandez MD, Silvana Cruz, DO, Mikey Sutherland MD,  Brandon Nelson MD, Danyell Elder MD, Geovanni Newsome, DO, Soraya Paulino MD, Hamilton De Souza MD, Juwan Deras MD, Jamey Bird MD, Taylor Frank MD, Jose Roberto Rodríguez MD, Marco Marshall, DO, Leanne Gandara MD, Lisa Patiño MD, Maddi Munoz CNP,  Dimple Smith, CNP, Sofia Ortega, CNP, Jorge Liang, CNP,  Jennifer Rosa, [facility], Hailey Segal, CNP, Rachele Pinedo, CNP, Brenda Lomeli, CNP, Maricel Simmons, CNP, Say Govea, CNP, FANNIE CortezC, Yunior Gomez, CNS, Aletha Choudhury, [facility], Minor Andujar, CNP, Fabiana Muniz, CNP, Coleen Nguyen, [address] [place] [address]    Progress Note    9/30/2022    7:46 AM    Name:   John Stanley  MRN:     [id number]     Acct:      [de-identified]   Room:   [address] [place] [address] Day:  2  Admit Date:  9/28/2022  2:58 AM    PCP:   CHAPO Bradley  Code Status:  Full Code    Subjective:     C/C:   Chief Complaint   Patient presents with    Abdominal Pain     Interval History Status: Significantly better    Patient seen and evaluated at bedside, was seen after he came back from his MRI, feeling better and tolerating clear liquid diet. His bilirubin/liver enzymes and lipase are improving. Per surgery no plan for OR today, I will resume patient diet. Afebrile overnight. His lipase, liver enzymes and bilirubin are trending down. Evaluated by GI and general surgery yesterday    Brief History:     Per my partner  John Stanley is a 25 y.o.  Yo male with  hx of pancreatitis and cholecystitis, Drug abuse including methamphetamine a few days before presentation who has been having ~ one year of  intermittent episodes of abdominal pain pancreatitis and gallstone problems for over a year who presents with significant epigastric and RUQ  pain for 4 days. Patient reports inabilityh to take in any PO during this time. Does admit to methamphetamines according to the staff he used a couple days ago. CT with contrast on admission showed Severe uncomplicated acute pancreatitis, not significantly changed from last   month's exam.New mild gallbladder distention without cholelithiasis, gallbladder wall  thickening or pericholecystic fluid, New mild extrahepatic biliary ductal dilatation. Labs showed lipase 1400, elevated lFTs and biliribin . He was [facility] symptomaticly and given IV meropenem and transferred to [facility] for admission. Review of Systems:     Review of Systems   Constitutional:  Positive for activity change, appetite change and fatigue. Negative for chills, diaphoresis and fever. HENT:  Negative for congestion. Eyes:  Negative for visual disturbance. Respiratory:  Negative for cough, chest tightness, shortness of breath and wheezing. Cardiovascular:  Negative for chest pain, palpitations and leg swelling. Gastrointestinal:  Positive for abdominal pain, nausea and vomiting. Negative for blood in stool, constipation and diarrhea. Genitourinary:  Negative for difficulty urinating. Neurological:  Negative for dizziness, weakness, light-headedness, numbness and headaches. All other systems reviewed and are negative. Medications: Allergies:     Allergies   Allergen Reactions    Augmentin [Amoxicillin-Pot Clavulanate]        Current Meds:   Scheduled Meds:    nicotine  1 patch TransDERmal Daily    clindamycin (CLEOCIN) IV  600 mg IntraVENous Q8H    dilTIAZem  120 mg Oral Daily    pantoprazole (PROTONIX) 40 mg injection  40 mg IntraVENous Daily    sodium chloride flush  5-40 mL IntraVENous 2 times per day    aspirin  324 mg Oral Daily     Continuous Infusions:    lactated ringers 150 mL/hr at 22 0209    sodium chloride       PRN Meds: promethazine, traMADol, sodium chloride flush, sodium chloride, potassium chloride, magnesium sulfate, HYDROmorphone **OR** HYDROmorphone, ondansetron **OR** ondansetron, heparin (porcine), heparin (porcine), fentanNYL    Data:     Past Medical History:   has a past medical history of Drug abuse ([address] Gallstone, and Pancreatitis. Social History:   reports that he has been smoking cigarettes. He started smoking about 5 years ago. He has a 4.00 pack-year smoking history. He has never used smokeless tobacco. He reports that he does not currently use alcohol. He reports current drug use. Drugs: Marijuana (Weed) and Methamphetamines (Crystal Meth). Family History: History reviewed. No pertinent family history. Vitals:  /74   Pulse 79   Temp 98.2 °F (36.8 °C) (Axillary)   Resp 16   Wt 206 lb 12.7 oz (93.8 kg)   SpO2 96%   BMI 26.55 kg/m²   Temp (24hrs), Av.3 °F (36.8 °C), Min:97.6 °F (36.4 °C), Max:99.2 °F (37.3 °C)    No results for input(s): POCGLU in the last 72 hours. I/O (24Hr):     Intake/Output Summary (Last 24 hours) at 2022 0746  Last data filed at 2022 4240  Gross per 24 hour   Intake 2975 ml   Output 2550 ml   Net 425 ml         Labs:  Hematology:  Recent Labs     22  2226 22  0321 22  0657 22  0454   WBC 10.2 13.4 11.6  --    RBC 5.31 5.72 5.21  --    HGB 15.8 17.4* 16.0  --    HCT 46.3 50.6* 47.5  --    MCV 87.3 88.5 91.2  --    MCH 29.8 30.4 30.7  --    MCHC 34.1 34.4 33.7  --    RDW 14.3 13.2 13.1  --     219 224  --    MPV  --  10.5 10.4  --    INR  --   --   --  1.1       Chemistry:  Recent Labs     22  2226 22  0321 22  0657 22  0454    138  --  139   K 3.8 4.2  --  3.5*    100  --  102   CO2 19* 21  --  21   GLUCOSE 120* 130*  --  100*   BUN 21* 20  --  6   CREATININE 0.55* 0.55*  --  0.53*   MG  --   --  2.1 1.6*   ANIONGAP 17 17  --  16   LABGLOM Pediatric GFR requires additional information. Refer to [facility] website for calculator. Pediatric GFR requires additional information. Refer to [facility] website for calculator. --  Pediatric GFR requires additional information. Refer to [facility] website for calculator. CALCIUM 9.6 9.5  --  8.2*   CAION  --   --   --  1.17   PHOS  --   --   --  2.7       Recent Labs     09/27/22  2226 09/28/22  0321 09/28/22  0657 09/29/22  0454   PROT 7.0 7.3  --  5.6*   LABALBU 4.6 4.8  --  3.7   TSH  --  0.74  --   --    * 151*  --  35   * 397*  --  186*   ALKPHOS 140* 149*  --  107   BILITOT 8.5* 6.7*  --  1.4*   BILIDIR  --   --  2.8*  --    LIPASE >3,000* >3,000*  --  1,848*   TRIG  --   --   --  83       ABG:No results found for: POCPH, PHART, PH, POCPCO2, LWD2LFK, PCO2, POCPO2, PO2ART, PO2, POCHCO3, PKH4JVL, HCO3, NBEA, PBEA, BEART, BE, THGBART, THB, KTV8PWV, ARUB5MSI, E1OJPGIZ, O2SAT, FIO2  Lab Results   Component Value Date/Time    SPECIAL 20ML RAC 09/27/2022 10:33 PM     Lab Results   Component Value Date/Time    CULTURE NO GROWTH 3 DAYS 09/27/2022 10:33 PM       Radiology:  CT ABDOMEN PELVIS W IV CONTRAST Additional Contrast? None    Result Date: 9/27/2022  1. Severe uncomplicated acute pancreatitis, not significantly changed from last month's exam. 2. New mild gallbladder distention without cholelithiasis, gallbladder wall thickening or pericholecystic fluid. This may reflect a prolonged interval since the patient's last meal. However, if there is clinical concern for cholecystitis, further evaluation with a nuclear HIDA scan could be considered. 3. New mild extrahepatic biliary ductal dilatation. If clinical or laboratory findings suggest biliary obstruction, further evaluation with MRCP or ERCP could be considered. US GALLBLADDER RUQ    Result Date: 9/28/2022  1. Incompletely seen pancreas with changes of pancreatitis better demonstrated on CT. 2. Cholelithiasis.   Mild gallbladder distention can be seen with cholecystitis, but other secondary sonographic findings of cholecystitis are absent. The wall thickening may be reactive to pancreatitis. Consider further evaluation with a nuclear medicine hepatobiliary scan if there are clinical findings of cholecystitis. 3. Minimal extrahepatic biliary dilation. Consider further evaluation with MRCP if there are clinical findings of cholestasis. 4. Trace perihepatic ascites, likely reactive. 5. Mild hepatic steatosis with minimal fatty sparing along the gallbladder fossa. XR CHEST PORTABLE    Result Date: 9/28/2022  No acute intrathoracic process. Physical Examination:        Physical Exam  Vitals and nursing note reviewed. Constitutional:       General: He is not in acute distress. Appearance: He is ill-appearing. HENT:      Head: Normocephalic and atraumatic. Eyes:      Conjunctiva/sclera: Conjunctivae normal.      Pupils: Pupils are equal, round, and reactive to light. Cardiovascular:      Rate and Rhythm: Normal rate and regular rhythm. Heart sounds: No murmur heard. Pulmonary:      Effort: Pulmonary effort is normal. No accessory muscle usage or respiratory distress. Breath sounds: No stridor. No decreased breath sounds, wheezing, rhonchi or rales. Abdominal:      General: Bowel sounds are normal. There is no distension. Palpations: Abdomen is soft. Abdomen is not rigid. Tenderness: There is abdominal tenderness in the right upper quadrant and epigastric area. There is no guarding. Musculoskeletal:         General: No tenderness. Skin:     General: Skin is warm and dry. Findings: No erythema, lesion or rash. Neurological:      Mental Status: He is alert and oriented to person, place, and time. Cranial Nerves: No cranial nerve deficit. Motor: No seizure activity. Psychiatric:         Speech: Speech normal.         Behavior: Behavior normal. Behavior is cooperative. Assessment:        Hospital Problems             Last Modified POA    * (Principal) Acute pancreatitis without infection or necrosis, unspecified pancreatitis type 9/28/2022 Yes    Acute pancreatitis, unspecified complication status, unspecified pancreatitis type 9/28/2022 Yes     Plan:        Acute on recurrent biliary pancreatitis: Patient with known alcohol use as well, lipase is coming down, continue aggressive hydration, keep on clear liquid as there is no plan for intervention today. N.p.o. at midnight per general surgery team.  Antiemetics    Patient has cholelithiasis and pancreatitis most likely triggered by stone: Evaluated by general surgery when he was admitted in with lisinopril infusion plan was for cholecystectomy, plan cyst time as well remains to be cholecystectomy wants pancreatitis resolved    New onset A. fib in a patient with alcohol use and drug use [positive for methamphetamine on admission]: Evaluated by cardiology and placed on heparin drip amiodarone Cardizem drip for rate control. Been on normal sinus rhythm since then .   Echo is normal, DC heparin drip    Discussed with the patient and the nurse  Continue to monitor    Renold Galeazzi, MD  9/30/2022  7:46 AM

## 2022-09-30 NOTE — PLAN OF CARE
Problem: Discharge Planning  Goal: Discharge to home or other facility with appropriate resources  Outcome: Progressing     Problem: Pain  Goal: Verbalizes/displays adequate comfort level or baseline comfort level  9/30/2022 1032 by Marlon Bowers RN  Outcome: Progressing  9/30/2022 0244 by Gwen Hickman RN  Outcome: Progressing     Problem: Safety - Adult  Goal: Free from fall injury  9/30/2022 1032 by Marlon Bowers RN  Outcome: Progressing  9/30/2022 0244 by Gwen Hickman RN  Outcome: Progressing     Problem: ABCDS Injury Assessment  Goal: Absence of physical injury  Outcome: Progressing

## 2022-09-30 NOTE — PROGRESS NOTES
[facility]   Gastroenterology Progress Note    Esperanza Castillo is a 25 y.o. male patient. Hospitalization Day:2      Chief consult reason:   Pancreatitis    Subjective:  Patient seen and examined. No acute events overnight. Patient in normal sinus rhythm  Urine output greatly improved  Patient reports less pain, less intensity  No nausea or vomiting overnight  LFT's still improving  MRI/MRCP showed presence of cholelithiasis, but no choledocholithiasis or retained CBD stone. No intra or extrahepatic ductal dilatation    VITALS:  /74   Pulse 79   Temp 98.2 °F (36.8 °C) (Axillary)   Resp 16   Wt 206 lb 12.7 oz (93.8 kg)   SpO2 96%   BMI 26.55 kg/m²   TEMPERATURE:  Current - Temp: 98.2 °F (36.8 °C); Max - Temp  Av.3 °F (36.8 °C)  Min: 97.6 °F (36.4 °C)  Max: 99.2 °F (37.3 °C)    Physical Assessment:  General appearance:  alert, cooperative and no distress  Mental Status:  oriented to person, place and time and normal affect  Lungs:  clear to auscultation bilaterally, normal effort  Heart:  regular rate and rhythm, no murmur  Abdomen:  soft, tender, nondistended, normal bowel sounds, no masses, hepatomegaly, splenomegaly  Extremities:  no edema, redness, tenderness in the calves  Skin:  no gross lesions, rashes, induration    Data Review:    Labs and Imaging:     CBC:  Recent Labs     22  0321 22  0657   WBC 10.2 13.4 11.6   HGB 15.8 17.4* 16.0   MCV 87.3 88.5 91.2   RDW 14.3 13.2 13.1    219 224         ANEMIA STUDIES:  No results for input(s): LABIRON, TIBC, FERRITIN, [id number], FOLATE, OCCULTBLD in the last 72 hours.     BMP:  Recent Labs     22  0321 22  0454    138 139   K 3.8 4.2 3.5*    100 102   CO2 19* 21 21   BUN 21* 20 6   CREATININE 0.55* 0.55* 0.53*   GLUCOSE 120* 130* 100*   CALCIUM 9.6 9.5 8.2*         LFTS:  Recent Labs     22  2226 22  0321 22  0454 ALKPHOS 140* 149*  --  107   * 397*  --  186*   * 151*  --  35   BILITOT 8.5* 6.7*  --  1.4*   BILIDIR  --   --  2.8*  --    LABALBU 4.6 4.8  --  3.7         Amylase/Lipase and Ammonia:  Recent Labs     09/27/22  2226 09/28/22  0321 09/29/22  0454   LIPASE >3,000* >3,000* 1,848*         Acute Hepatitis Panel:  No results found for: HEPBSAG, HEPCAB, HEPBIGM, HEPAIGM    HCV Genotype:  No results found for: HEPATITISCGENOTYPE    HCV Quantitative:  No results found for: HCVQNT    LIVER WORK UP:    AFP  No results found for: AFP    Alpha 1 antitrypsin   No results found for: A1A    DILLAN  Lab Results   Component Value Date/Time    DILLAN NEGATIVE 08/14/2022 02:38 PM       AMA  No results found for: MITOAB    ASMA  No results found for: SMOOTHMUSCAB    PT/INR  Recent Labs     09/29/22  0454   PROTIME 11.2   INR 1.1         Cancer Markers:  CEA:  No results for input(s): CEA in the last 72 hours. Ca 125:  No results for input(s):  in the last 72 hours. Ca 19-9:   Invalid input(s):   AFP: No results for input(s): AFP in the last 72 hours. Lactic acid:Invalid input(s): LACTIC ACID    Radiology Review:    No results found. 9/30/2022 MRI/MRCP   FINDINGS:   Motion artifact degrades the study. This decreases sensitivity and   specificity. Trace pleural effusions are seen bilaterally. Gallbladder: Gallbladder is distended. Gallstones are seen. .  No gallbladder   wall thickening       Bile Ducts: No intra or extrahepatic biliary ductal dilatation. Extrahepatic   common duct measures 7 mm. No retained common duct stone       Pancreatic Duct: No pancreatic ductal dilatation is seen. There is   ill-defined peripancreatic edema. Other: There is trace abdominal and pelvic ascites       Adrenal glands unremarkable. No hydronephrosis on right. No hydronephrosis on left.        Spleen is mildly enlarged measuring 16 mm cranial caudal.       No significant small or large bowel [phone]  9/30/2022  8:35 AM    This note was created with the assistance of a speech-recognition program.  Although the intention is to generate a document that actually reflects the content of the visit, no guarantees can be provided that every mistake has been identified and corrected by editing.

## 2022-10-01 ENCOUNTER — ANESTHESIA (OUTPATIENT)
Dept: OPERATING ROOM | Age: 22
DRG: 263 | End: 2022-10-01
Payer: MEDICAID

## 2022-10-01 ENCOUNTER — ANESTHESIA EVENT (OUTPATIENT)
Dept: OPERATING ROOM | Age: 22
DRG: 263 | End: 2022-10-01
Payer: MEDICAID

## 2022-10-01 PROCEDURE — 88304 TISSUE EXAM BY PATHOLOGIST: CPT

## 2022-10-01 PROCEDURE — 3700000000 HC ANESTHESIA ATTENDED CARE: Performed by: SURGERY

## 2022-10-01 PROCEDURE — 2500000003 HC RX 250 WO HCPCS

## 2022-10-01 PROCEDURE — 99232 SBSQ HOSP IP/OBS MODERATE 35: CPT | Performed by: FAMILY MEDICINE

## 2022-10-01 PROCEDURE — 2500000003 HC RX 250 WO HCPCS: Performed by: NURSE ANESTHETIST, CERTIFIED REGISTERED

## 2022-10-01 PROCEDURE — 2500000003 HC RX 250 WO HCPCS: Performed by: SURGERY

## 2022-10-01 PROCEDURE — 0DNU4ZZ RELEASE OMENTUM, PERCUTANEOUS ENDOSCOPIC APPROACH: ICD-10-PCS | Performed by: SURGERY

## 2022-10-01 PROCEDURE — 6370000000 HC RX 637 (ALT 250 FOR IP): Performed by: STUDENT IN AN ORGANIZED HEALTH CARE EDUCATION/TRAINING PROGRAM

## 2022-10-01 PROCEDURE — 1200000000 HC SEMI PRIVATE

## 2022-10-01 PROCEDURE — 6360000002 HC RX W HCPCS: Performed by: STUDENT IN AN ORGANIZED HEALTH CARE EDUCATION/TRAINING PROGRAM

## 2022-10-01 PROCEDURE — 2580000003 HC RX 258: Performed by: NURSE ANESTHETIST, CERTIFIED REGISTERED

## 2022-10-01 PROCEDURE — 2580000003 HC RX 258: Performed by: STUDENT IN AN ORGANIZED HEALTH CARE EDUCATION/TRAINING PROGRAM

## 2022-10-01 PROCEDURE — 6370000000 HC RX 637 (ALT 250 FOR IP): Performed by: FAMILY MEDICINE

## 2022-10-01 PROCEDURE — 7100000001 HC PACU RECOVERY - ADDTL 15 MIN: Performed by: SURGERY

## 2022-10-01 PROCEDURE — 2580000003 HC RX 258: Performed by: NURSE PRACTITIONER

## 2022-10-01 PROCEDURE — 7100000000 HC PACU RECOVERY - FIRST 15 MIN: Performed by: SURGERY

## 2022-10-01 PROCEDURE — 2709999900 HC NON-CHARGEABLE SUPPLY: Performed by: SURGERY

## 2022-10-01 PROCEDURE — 2580000003 HC RX 258: Performed by: SURGERY

## 2022-10-01 PROCEDURE — 2580000003 HC RX 258: Performed by: ANESTHESIOLOGY

## 2022-10-01 PROCEDURE — 2500000003 HC RX 250 WO HCPCS: Performed by: STUDENT IN AN ORGANIZED HEALTH CARE EDUCATION/TRAINING PROGRAM

## 2022-10-01 PROCEDURE — 0FT44ZZ RESECTION OF GALLBLADDER, PERCUTANEOUS ENDOSCOPIC APPROACH: ICD-10-PCS | Performed by: SURGERY

## 2022-10-01 PROCEDURE — 6360000002 HC RX W HCPCS: Performed by: NURSE ANESTHETIST, CERTIFIED REGISTERED

## 2022-10-01 PROCEDURE — 3600000014 HC SURGERY LEVEL 4 ADDTL 15MIN: Performed by: SURGERY

## 2022-10-01 PROCEDURE — 3700000001 HC ADD 15 MINUTES (ANESTHESIA): Performed by: SURGERY

## 2022-10-01 PROCEDURE — 3600000004 HC SURGERY LEVEL 4 BASE: Performed by: SURGERY

## 2022-10-01 PROCEDURE — 6370000000 HC RX 637 (ALT 250 FOR IP): Performed by: NURSE PRACTITIONER

## 2022-10-01 PROCEDURE — 6360000002 HC RX W HCPCS: Performed by: NURSE PRACTITIONER

## 2022-10-01 PROCEDURE — 6360000002 HC RX W HCPCS: Performed by: ANESTHESIOLOGY

## 2022-10-01 RX ORDER — SIMETHICONE 80 MG
80 TABLET,CHEWABLE ORAL 4 TIMES DAILY
Status: DISCONTINUED | OUTPATIENT
Start: 2022-10-01 | End: 2022-10-02 | Stop reason: HOSPADM

## 2022-10-01 RX ORDER — BUPIVACAINE HYDROCHLORIDE AND EPINEPHRINE 5; 5 MG/ML; UG/ML
INJECTION, SOLUTION PERINEURAL PRN
Status: DISCONTINUED | OUTPATIENT
Start: 2022-10-01 | End: 2022-10-01 | Stop reason: HOSPADM

## 2022-10-01 RX ORDER — SODIUM CHLORIDE, SODIUM LACTATE, POTASSIUM CHLORIDE, CALCIUM CHLORIDE 600; 310; 30; 20 MG/100ML; MG/100ML; MG/100ML; MG/100ML
INJECTION, SOLUTION INTRAVENOUS CONTINUOUS PRN
Status: DISCONTINUED | OUTPATIENT
Start: 2022-10-01 | End: 2022-10-01 | Stop reason: SDUPTHER

## 2022-10-01 RX ORDER — FENTANYL CITRATE 50 UG/ML
25 INJECTION, SOLUTION INTRAMUSCULAR; INTRAVENOUS EVERY 5 MIN PRN
Status: DISCONTINUED | OUTPATIENT
Start: 2022-10-01 | End: 2022-10-01 | Stop reason: HOSPADM

## 2022-10-01 RX ORDER — SODIUM CHLORIDE 9 MG/ML
INJECTION, SOLUTION INTRAVENOUS PRN
Status: DISCONTINUED | OUTPATIENT
Start: 2022-10-01 | End: 2022-10-01 | Stop reason: HOSPADM

## 2022-10-01 RX ORDER — KETOROLAC TROMETHAMINE 30 MG/ML
INJECTION, SOLUTION INTRAMUSCULAR; INTRAVENOUS PRN
Status: DISCONTINUED | OUTPATIENT
Start: 2022-10-01 | End: 2022-10-01 | Stop reason: SDUPTHER

## 2022-10-01 RX ORDER — NEOSTIGMINE METHYLSULFATE 5 MG/5 ML
SYRINGE (ML) INTRAVENOUS PRN
Status: DISCONTINUED | OUTPATIENT
Start: 2022-10-01 | End: 2022-10-01 | Stop reason: SDUPTHER

## 2022-10-01 RX ORDER — LIDOCAINE HYDROCHLORIDE 10 MG/ML
INJECTION, SOLUTION EPIDURAL; INFILTRATION; INTRACAUDAL; PERINEURAL PRN
Status: DISCONTINUED | OUTPATIENT
Start: 2022-10-01 | End: 2022-10-01 | Stop reason: SDUPTHER

## 2022-10-01 RX ORDER — ONDANSETRON 2 MG/ML
4 INJECTION INTRAMUSCULAR; INTRAVENOUS
Status: DISCONTINUED | OUTPATIENT
Start: 2022-10-01 | End: 2022-10-01 | Stop reason: HOSPADM

## 2022-10-01 RX ORDER — MAGNESIUM HYDROXIDE 1200 MG/15ML
LIQUID ORAL CONTINUOUS PRN
Status: DISCONTINUED | OUTPATIENT
Start: 2022-10-01 | End: 2022-10-01 | Stop reason: HOSPADM

## 2022-10-01 RX ORDER — DEXAMETHASONE SODIUM PHOSPHATE 10 MG/ML
INJECTION INTRAMUSCULAR; INTRAVENOUS PRN
Status: DISCONTINUED | OUTPATIENT
Start: 2022-10-01 | End: 2022-10-01 | Stop reason: SDUPTHER

## 2022-10-01 RX ORDER — SODIUM CHLORIDE 0.9 % (FLUSH) 0.9 %
5-40 SYRINGE (ML) INJECTION PRN
Status: DISCONTINUED | OUTPATIENT
Start: 2022-10-01 | End: 2022-10-01 | Stop reason: HOSPADM

## 2022-10-01 RX ORDER — SODIUM CHLORIDE 0.9 % (FLUSH) 0.9 %
5-40 SYRINGE (ML) INJECTION EVERY 12 HOURS SCHEDULED
Status: DISCONTINUED | OUTPATIENT
Start: 2022-10-01 | End: 2022-10-01 | Stop reason: HOSPADM

## 2022-10-01 RX ORDER — GLYCOPYRROLATE 0.2 MG/ML
INJECTION INTRAMUSCULAR; INTRAVENOUS PRN
Status: DISCONTINUED | OUTPATIENT
Start: 2022-10-01 | End: 2022-10-01 | Stop reason: SDUPTHER

## 2022-10-01 RX ORDER — DIPHENHYDRAMINE HYDROCHLORIDE 50 MG/ML
INJECTION INTRAMUSCULAR; INTRAVENOUS PRN
Status: DISCONTINUED | OUTPATIENT
Start: 2022-10-01 | End: 2022-10-01 | Stop reason: SDUPTHER

## 2022-10-01 RX ORDER — ONDANSETRON 2 MG/ML
INJECTION INTRAMUSCULAR; INTRAVENOUS PRN
Status: DISCONTINUED | OUTPATIENT
Start: 2022-10-01 | End: 2022-10-01 | Stop reason: SDUPTHER

## 2022-10-01 RX ORDER — ROCURONIUM BROMIDE 10 MG/ML
INJECTION, SOLUTION INTRAVENOUS PRN
Status: DISCONTINUED | OUTPATIENT
Start: 2022-10-01 | End: 2022-10-01 | Stop reason: SDUPTHER

## 2022-10-01 RX ORDER — DIPHENHYDRAMINE HYDROCHLORIDE 50 MG/ML
12.5 INJECTION INTRAMUSCULAR; INTRAVENOUS
Status: DISCONTINUED | OUTPATIENT
Start: 2022-10-01 | End: 2022-10-01 | Stop reason: HOSPADM

## 2022-10-01 RX ORDER — FENTANYL CITRATE 50 UG/ML
INJECTION, SOLUTION INTRAMUSCULAR; INTRAVENOUS PRN
Status: DISCONTINUED | OUTPATIENT
Start: 2022-10-01 | End: 2022-10-01 | Stop reason: SDUPTHER

## 2022-10-01 RX ORDER — FENTANYL CITRATE 50 UG/ML
50 INJECTION, SOLUTION INTRAMUSCULAR; INTRAVENOUS EVERY 5 MIN PRN
Status: DISCONTINUED | OUTPATIENT
Start: 2022-10-01 | End: 2022-10-01 | Stop reason: HOSPADM

## 2022-10-01 RX ORDER — PROPOFOL 10 MG/ML
INJECTION, EMULSION INTRAVENOUS PRN
Status: DISCONTINUED | OUTPATIENT
Start: 2022-10-01 | End: 2022-10-01 | Stop reason: SDUPTHER

## 2022-10-01 RX ADMIN — TRAMADOL HYDROCHLORIDE 50 MG: 50 TABLET, COATED ORAL at 22:10

## 2022-10-01 RX ADMIN — ROCURONIUM BROMIDE 50 MG: 10 INJECTION, SOLUTION INTRAVENOUS at 09:05

## 2022-10-01 RX ADMIN — DEXAMETHASONE SODIUM PHOSPHATE 5 MG: 10 INJECTION INTRAMUSCULAR; INTRAVENOUS at 09:16

## 2022-10-01 RX ADMIN — FENTANYL CITRATE 50 MCG: 50 INJECTION, SOLUTION INTRAMUSCULAR; INTRAVENOUS at 10:15

## 2022-10-01 RX ADMIN — FENTANYL CITRATE 100 MCG: 50 INJECTION, SOLUTION INTRAMUSCULAR; INTRAVENOUS at 09:05

## 2022-10-01 RX ADMIN — Medication 4 MG: at 10:01

## 2022-10-01 RX ADMIN — LIDOCAINE HYDROCHLORIDE 50 MG: 10 INJECTION, SOLUTION EPIDURAL; INFILTRATION; INTRACAUDAL; PERINEURAL at 09:05

## 2022-10-01 RX ADMIN — GLYCOPYRROLATE 0.8 MG: 0.2 INJECTION INTRAMUSCULAR; INTRAVENOUS at 10:01

## 2022-10-01 RX ADMIN — PROPOFOL 200 MG: 10 INJECTION, EMULSION INTRAVENOUS at 09:05

## 2022-10-01 RX ADMIN — TRAMADOL HYDROCHLORIDE 50 MG: 50 TABLET, COATED ORAL at 13:57

## 2022-10-01 RX ADMIN — CLINDAMYCIN IN 5 PERCENT DEXTROSE 600 MG: 12 INJECTION, SOLUTION INTRAVENOUS at 16:30

## 2022-10-01 RX ADMIN — SODIUM CHLORIDE: 9 INJECTION, SOLUTION INTRAVENOUS at 00:34

## 2022-10-01 RX ADMIN — HYDROMORPHONE HYDROCHLORIDE 0.5 MG: 1 INJECTION, SOLUTION INTRAMUSCULAR; INTRAVENOUS; SUBCUTANEOUS at 19:32

## 2022-10-01 RX ADMIN — KETOROLAC TROMETHAMINE 15 MG: 30 INJECTION, SOLUTION INTRAMUSCULAR at 09:58

## 2022-10-01 RX ADMIN — DIPHENHYDRAMINE HYDROCHLORIDE 12.5 MG: 50 INJECTION, SOLUTION INTRAMUSCULAR; INTRAVENOUS at 09:19

## 2022-10-01 RX ADMIN — SODIUM CHLORIDE, PRESERVATIVE FREE 10 ML: 5 INJECTION INTRAVENOUS at 22:19

## 2022-10-01 RX ADMIN — SODIUM CHLORIDE, PRESERVATIVE FREE 10 ML: 5 INJECTION INTRAVENOUS at 11:01

## 2022-10-01 RX ADMIN — SIMETHICONE 80 MG: 80 TABLET, CHEWABLE ORAL at 22:11

## 2022-10-01 RX ADMIN — HYDROMORPHONE HYDROCHLORIDE 0.25 MG: 1 INJECTION, SOLUTION INTRAMUSCULAR; INTRAVENOUS; SUBCUTANEOUS at 02:45

## 2022-10-01 RX ADMIN — CLINDAMYCIN IN 5 PERCENT DEXTROSE 600 MG: 12 INJECTION, SOLUTION INTRAVENOUS at 00:35

## 2022-10-01 RX ADMIN — TRAMADOL HYDROCHLORIDE 50 MG: 50 TABLET, COATED ORAL at 00:24

## 2022-10-01 RX ADMIN — PROPOFOL 50 MG: 10 INJECTION, EMULSION INTRAVENOUS at 10:05

## 2022-10-01 RX ADMIN — HYDROMORPHONE HYDROCHLORIDE 0.25 MG: 1 INJECTION, SOLUTION INTRAMUSCULAR; INTRAVENOUS; SUBCUTANEOUS at 11:07

## 2022-10-01 RX ADMIN — FENTANYL CITRATE 50 MCG: 50 INJECTION, SOLUTION INTRAMUSCULAR; INTRAVENOUS at 09:31

## 2022-10-01 RX ADMIN — HYDROMORPHONE HYDROCHLORIDE 0.25 MG: 1 INJECTION, SOLUTION INTRAMUSCULAR; INTRAVENOUS; SUBCUTANEOUS at 05:59

## 2022-10-01 RX ADMIN — TRAMADOL HYDROCHLORIDE 50 MG: 50 TABLET, COATED ORAL at 18:11

## 2022-10-01 RX ADMIN — FENTANYL CITRATE 50 MCG: 50 INJECTION, SOLUTION INTRAMUSCULAR; INTRAVENOUS at 10:41

## 2022-10-01 RX ADMIN — SODIUM CHLORIDE, POTASSIUM CHLORIDE, SODIUM LACTATE AND CALCIUM CHLORIDE: 600; 310; 30; 20 INJECTION, SOLUTION INTRAVENOUS at 08:45

## 2022-10-01 RX ADMIN — ONDANSETRON 4 MG: 2 INJECTION INTRAMUSCULAR; INTRAVENOUS at 09:56

## 2022-10-01 RX ADMIN — FENTANYL CITRATE 50 MCG: 50 INJECTION, SOLUTION INTRAMUSCULAR; INTRAVENOUS at 10:28

## 2022-10-01 RX ADMIN — SODIUM CHLORIDE, POTASSIUM CHLORIDE, SODIUM LACTATE AND CALCIUM CHLORIDE: 600; 310; 30; 20 INJECTION, SOLUTION INTRAVENOUS at 00:28

## 2022-10-01 ASSESSMENT — PAIN DESCRIPTION - DESCRIPTORS
DESCRIPTORS: SORE
DESCRIPTORS: ACHING
DESCRIPTORS: ACHING
DESCRIPTORS: SORE;ACHING

## 2022-10-01 ASSESSMENT — ENCOUNTER SYMPTOMS
ABDOMINAL PAIN: 1
BLOOD IN STOOL: 0
CHEST TIGHTNESS: 0
COUGH: 0
CONSTIPATION: 0
SHORTNESS OF BREATH: 0
VOMITING: 1
WHEEZING: 0
DIARRHEA: 0

## 2022-10-01 ASSESSMENT — PAIN DESCRIPTION - LOCATION
LOCATION: ABDOMEN
LOCATION: ABDOMEN;BACK
LOCATION: ABDOMEN

## 2022-10-01 ASSESSMENT — PAIN SCALES - GENERAL
PAINLEVEL_OUTOF10: 6
PAINLEVEL_OUTOF10: 6
PAINLEVEL_OUTOF10: 7
PAINLEVEL_OUTOF10: 6
PAINLEVEL_OUTOF10: 6
PAINLEVEL_OUTOF10: 10
PAINLEVEL_OUTOF10: 6
PAINLEVEL_OUTOF10: 8
PAINLEVEL_OUTOF10: 6
PAINLEVEL_OUTOF10: 10
PAINLEVEL_OUTOF10: 5
PAINLEVEL_OUTOF10: 8

## 2022-10-01 ASSESSMENT — PAIN - FUNCTIONAL ASSESSMENT
PAIN_FUNCTIONAL_ASSESSMENT: PREVENTS OR INTERFERES WITH MANY ACTIVE NOT PASSIVE ACTIVITIES
PAIN_FUNCTIONAL_ASSESSMENT: PREVENTS OR INTERFERES SOME ACTIVE ACTIVITIES AND ADLS
PAIN_FUNCTIONAL_ASSESSMENT: PREVENTS OR INTERFERES SOME ACTIVE ACTIVITIES AND ADLS

## 2022-10-01 ASSESSMENT — PAIN DESCRIPTION - ONSET: ONSET: AWAKENED FROM SLEEP

## 2022-10-01 ASSESSMENT — PAIN DESCRIPTION - PAIN TYPE
TYPE: SURGICAL PAIN
TYPE: SURGICAL PAIN

## 2022-10-01 ASSESSMENT — PAIN DESCRIPTION - FREQUENCY: FREQUENCY: INTERMITTENT

## 2022-10-01 ASSESSMENT — PAIN DESCRIPTION - ORIENTATION
ORIENTATION: LEFT
ORIENTATION: LEFT

## 2022-10-01 NOTE — PLAN OF CARE
Problem: Discharge Planning  Goal: Discharge to home or other facility with appropriate resources  10/1/2022 1752 by Aristeo San RN  Outcome: Progressing  10/1/2022 0354 by Lindy Tarango RN  Outcome: Progressing     Problem: Pain  Goal: Verbalizes/displays adequate comfort level or baseline comfort level  Outcome: Progressing     Problem: Safety - Adult  Goal: Free from fall injury  Outcome: Progressing     Problem: ABCDS Injury Assessment  Goal: Absence of physical injury  10/1/2022 1752 by Aristeo San RN  Outcome: Progressing  10/1/2022 0354 by Lindy Tarango RN  Outcome: Progressing

## 2022-10-01 NOTE — PROGRESS NOTES
[facility]  Office: [address], DO, Dagoberto Patel, DO, Lynsey David, DO, Ant Woodward Blood, DO, Clark Rapp MD, Pancho Bah MD, Espinoza Carter MD, Renold Galeazzi, MD,  Fredi Hassan MD, Dami Ramos MD, Julieta Pappas, DO, Tc Bourne MD,  Armando Harris MD, Freada Osgood, MD, Kayla Shepard, DO, Josue Ariza MD, Anna Tirado MD, Ilda Matute MD, Raymond De La Cruz MD, Carlton Murrell MD, Estrellita Jimenez MD, Rubio Hines, DO, Olga Leos MD, Leah Del Rio MD, Veronica Perera, CNP,  Nathalie Taylor, CNP, Taisha Alvaerz, CNP, Deepti Hendrickson, CNP,  Stephane Cameron, DNP, [place], CNP, Alayna Brown, CNP, Karen Morrell, CNP, Hortencia Carter, CNP, Tiburcio Umanzor, CNP, CHAPO Maciel-C, Ben Flores, CNS, Mahesh Rust, DNP, Ruth Adrian, CNP, Bonnie Mott, CNP, Oumar Bloch, [address] [place] [address]    Progress Note    10/1/2022    1:21 PM    Name:   Mando Vidal  MRN:     [id number]     Acct:      [de-identified]   Room:   [address] [place] [address] Day:  3  Admit Date:  9/28/2022  2:58 AM    PCP:   CHAPO Frederick  Code Status:  Full Code    Subjective:     C/C:   Chief Complaint   Patient presents with    Abdominal Pain     Interval History Status: Significantly better    Patient seen and evaluated at bedside , had his surgery this am   Sitting in chair and about to eat his meal   His lipase, liver enzymes and bilirubin are trending down. Evaluated by GI and general surgery yesterday    Brief History:     Per my partner  Mando Vidal is a 25 y.o. Fanny Bora male with  hx of pancreatitis and cholecystitis, Drug abuse including methamphetamine a few days before presentation who has been having ~ one year of  intermittent episodes of abdominal pain pancreatitis and gallstone problems for over a year who presents with significant epigastric and RUQ  pain for 4 days.  Patient reports inabilityh to take in any PO during this time. Does admit to methamphetamines according to the staff he used a couple days ago. CT with contrast on admission showed Severe uncomplicated acute pancreatitis, not significantly changed from last   month's exam.New mild gallbladder distention without cholelithiasis, gallbladder wall  thickening or pericholecystic fluid, New mild extrahepatic biliary ductal dilatation. Labs showed lipase 1400, elevated lFTs and biliribin . He was [facility] symptomaticly and given IV meropenem and transferred to [facility] for admission. Review of Systems:     Review of Systems   Constitutional:  Positive for activity change. Negative for chills, diaphoresis and fever. HENT:  Negative for congestion. Eyes:  Negative for visual disturbance. Respiratory:  Negative for cough, chest tightness, shortness of breath and wheezing. Cardiovascular:  Negative for chest pain, palpitations and leg swelling. Gastrointestinal:  Positive for abdominal pain and vomiting. Negative for blood in stool, constipation and diarrhea. Genitourinary:  Negative for difficulty urinating. Neurological:  Negative for dizziness, weakness, light-headedness, numbness and headaches. All other systems reviewed and are negative. Medications: Allergies:     Allergies   Allergen Reactions    Augmentin [Amoxicillin-Pot Clavulanate]        Current Meds:   Scheduled Meds:    nicotine  1 patch TransDERmal Daily    clindamycin (CLEOCIN) IV  600 mg IntraVENous Q8H    dilTIAZem  120 mg Oral Daily    pantoprazole (PROTONIX) 40 mg injection  40 mg IntraVENous Daily    sodium chloride flush  5-40 mL IntraVENous 2 times per day    aspirin  324 mg Oral Daily     Continuous Infusions:    lactated ringers 150 mL/hr at 10/01/22 0028    sodium chloride 5 mL/hr at 10/01/22 0034     PRN Meds: sodium chloride flush, promethazine, traMADol, sodium chloride flush, sodium chloride, potassium chloride, magnesium sulfate, HYDROmorphone **OR** HYDROmorphone, ondansetron **OR** ondansetron, fentanNYL    Data:     Past Medical History:   has a past medical history of Drug abuse ([address] Gallstone, and Pancreatitis. Social History:   reports that he has been smoking cigarettes. He started smoking about 5 years ago. He has a 4.00 pack-year smoking history. He has never used smokeless tobacco. He reports that he does not currently use alcohol. He reports current drug use. Drugs: Marijuana (Weed) and Methamphetamines (Crystal Meth). Family History: History reviewed. No pertinent family history. Vitals:  BP (!) 142/89   Pulse (!) 111   Temp 98.2 °F (36.8 °C) (Oral)   Resp 17   Wt 206 lb 12.7 oz (93.8 kg)   SpO2 96%   BMI 26.55 kg/m²   Temp (24hrs), Av.3 °F (36.8 °C), Min:97.4 °F (36.3 °C), Max:99.2 °F (37.3 °C)    No results for input(s): POCGLU in the last 72 hours. I/O (24Hr): Intake/Output Summary (Last 24 hours) at 10/1/2022 1321  Last data filed at 10/1/2022 1142  Gross per 24 hour   Intake 3289.6 ml   Output 2005 ml   Net 1284.6 ml         Labs:  Hematology:  Recent Labs     22  1226   WBC  --  10.1   RBC  --  4.70   HGB  --  14.1   HCT  --  41.4   MCV  --  88.1   MCH  --  30.0   MCHC  --  34.1   RDW  --  12.9   PLT  --  See Reflexed IPF Result   INR 1.1  --        Chemistry:  Recent Labs     22  1226    137   K 3.5* 3.8    101   CO2 21 26   GLUCOSE 100* 73   BUN 6 5*   CREATININE 0.53* 0.41*   MG 1.6*  --    ANIONGAP 16 10   LABGLOM Pediatric GFR requires additional information. Refer to [facility] website for calculator. Pediatric GFR requires additional information. Refer to [facility] website for calculator.    CALCIUM 8.2* 8.6   CAION 1.17  --    PHOS 2.7  --        Recent Labs     22  0454 22  1226   PROT 5.6* 6.3*   LABALBU 3.7 3.5   AST 35 22   * 118*   ALKPHOS 107 101   BILITOT 1.4* 1.3*   LIPASE 1,848* 431* TRIG 83  --        ABG:No results found for: POCPH, PHART, PH, POCPCO2, SQB6LOB, PCO2, POCPO2, PO2ART, PO2, POCHCO3, NVF2CLB, HCO3, NBEA, PBEA, BEART, BE, THGBART, THB, DPL0LZO, TAJT1LOJ, U6VJEZUI, O2SAT, FIO2  Lab Results   Component Value Date/Time    SPECIAL 20ML RAC 09/27/2022 10:33 PM     Lab Results   Component Value Date/Time    CULTURE NO GROWTH 4 DAYS 09/27/2022 10:33 PM       Radiology:  CT ABDOMEN PELVIS W IV CONTRAST Additional Contrast? None    Result Date: 9/27/2022  1. Severe uncomplicated acute pancreatitis, not significantly changed from last month's exam. 2. New mild gallbladder distention without cholelithiasis, gallbladder wall thickening or pericholecystic fluid. This may reflect a prolonged interval since the patient's last meal. However, if there is clinical concern for cholecystitis, further evaluation with a nuclear HIDA scan could be considered. 3. New mild extrahepatic biliary ductal dilatation. If clinical or laboratory findings suggest biliary obstruction, further evaluation with MRCP or ERCP could be considered. US GALLBLADDER RUQ    Result Date: 9/28/2022  1. Incompletely seen pancreas with changes of pancreatitis better demonstrated on CT. 2. Cholelithiasis. Mild gallbladder distention can be seen with cholecystitis, but other secondary sonographic findings of cholecystitis are absent. The wall thickening may be reactive to pancreatitis. Consider further evaluation with a nuclear medicine hepatobiliary scan if there are clinical findings of cholecystitis. 3. Minimal extrahepatic biliary dilation. Consider further evaluation with MRCP if there are clinical findings of cholestasis. 4. Trace perihepatic ascites, likely reactive. 5. Mild hepatic steatosis with minimal fatty sparing along the gallbladder fossa. XR CHEST PORTABLE    Result Date: 9/28/2022  No acute intrathoracic process. Physical Examination:        Physical Exam  Vitals and nursing note reviewed. Constitutional:       General: He is not in acute distress. Appearance: He is ill-appearing. HENT:      Head: Normocephalic and atraumatic. Eyes:      Conjunctiva/sclera: Conjunctivae normal.      Pupils: Pupils are equal, round, and reactive to light. Cardiovascular:      Rate and Rhythm: Normal rate and regular rhythm. Heart sounds: No murmur heard. Pulmonary:      Effort: Pulmonary effort is normal. No accessory muscle usage or respiratory distress. Breath sounds: No stridor. No decreased breath sounds, wheezing, rhonchi or rales. Abdominal:      General: Bowel sounds are normal. There is no distension. Palpations: Abdomen is soft. Abdomen is not rigid. Tenderness: There is abdominal tenderness in the right upper quadrant and epigastric area. There is no guarding. Comments: S/p lap juan, wounds are CDI   Musculoskeletal:         General: No tenderness. Skin:     General: Skin is warm and dry. Findings: No erythema, lesion or rash. Neurological:      Mental Status: He is alert and oriented to person, place, and time. Cranial Nerves: No cranial nerve deficit. Motor: No seizure activity. Psychiatric:         Speech: Speech normal.         Behavior: Behavior normal. Behavior is cooperative.        Assessment:        Hospital Problems             Last Modified POA    * (Principal) Acute pancreatitis without infection or necrosis, unspecified pancreatitis type 9/28/2022 Yes    Acute pancreatitis, unspecified complication status, unspecified pancreatitis type 9/28/2022 Yes    Calculus of gallbladder with biliary obstruction but without cholecystitis 9/30/2022 Yes     Plan:        Acute on recurrent gallstone pancreatitis/ cholelithiasis/cholecystitis: Patient with known alcohol use as well, lipase is coming down, continue aggressive hydration  MRCP no choledocholithiasis  S/p lap juan on 10/1  Evaluated by GI as well    New onset A. fib in a patient with alcohol use and drug use [positive for methamphetamine on admission]: Evaluated by cardiology and placed on heparin drip amiodarone Cardizem drip for rate control. Been on normal sinus rhythm since then . Echo is normal, DC heparin drip    Discussed with the patient and the nurse  Continue to monitor    Discharge plan later today or in a.m.       Hernan Mcgovern MD  10/1/2022  1:21 PM

## 2022-10-01 NOTE — PROGRESS NOTES
General Surgery:  Daily Progress Note            PATIENT NAME: Ervin Ochoa     TODAY'S DATE: 10/1/2022, 6:34 AM  CC:  Pt c/o of periumbilical pain. SUBJECTIVE:     Patient seen and chart reviewed. No acute events overnight. Afebrile, normotensive, normal sinus rhythm, and saturating >95% on RA. Voiding with good uop. Pain controlled. Ambulating. Hep gtt held for OR today. Denies fever or chills  Denies SOB or cough  Denies palpitations or CP  Denies abdominal pain, nausea, vomiting, or diarrhea    OBJECTIVE:   VITALS:  BP (!) 118/51   Pulse 69   Temp 98.6 °F (37 °C) (Axillary)   Resp 14   Wt 206 lb 12.7 oz (93.8 kg)   SpO2 94%   BMI 26.55 kg/m²      INTAKE/OUTPUT:      Intake/Output Summary (Last 24 hours) at 10/1/2022 5751  Last data filed at 10/1/2022 2423  Gross per 24 hour   Intake 2489.6 ml   Output 2400 ml   Net 89.6 ml       PHYSICAL EXAM:  General Appearance: in no acute distress, resting comfortably in bed  Heart: Heart sounds are normal.  Regular rate and rhythm. Lungs: Normal expansion. Abdomen: Tenderness in periumbilical region, no rebound or guarding. No distention, normoactive bowel sounds. Positive [place] sign. Extremities: Moves all extremities x4.      IV Access: Same as yesterday  Ortiz: None      Data:  CBC with Differential:    Lab Results   Component Value Date/Time    WBC 10.1 09/30/2022 12:26 PM    RBC 4.70 09/30/2022 12:26 PM    HGB 14.1 09/30/2022 12:26 PM    HCT 41.4 09/30/2022 12:26 PM    PLT See Reflexed IPF Result 09/30/2022 12:26 PM    MCV 88.1 09/30/2022 12:26 PM    MCH 30.0 09/30/2022 12:26 PM    MCHC 34.1 09/30/2022 12:26 PM    RDW 12.9 09/30/2022 12:26 PM    LYMPHOPCT 19 09/30/2022 12:26 PM    MONOPCT 7 09/30/2022 12:26 PM    BASOPCT 0 09/30/2022 12:26 PM    MONOSABS 0.75 09/30/2022 12:26 PM    LYMPHSABS 1.91 09/30/2022 12:26 PM    EOSABS 0.10 09/30/2022 12:26 PM    BASOSABS 0.04 09/30/2022 12:26 PM    DIFFTYPE YES 09/27/2022 10:26 PM     CMP:    Lab Results   Component Value Date/Time     09/30/2022 12:26 PM    K 3.8 09/30/2022 12:26 PM     09/30/2022 12:26 PM    CO2 26 09/30/2022 12:26 PM    BUN 5 09/30/2022 12:26 PM    CREATININE 0.41 09/30/2022 12:26 PM    GFRAA NOT REPORTED 02/01/2022 09:58 PM    LABGLOM  09/30/2022 12:26 PM     Pediatric GFR requires additional information. Refer to [facility] website for calculator. GLUCOSE 73 09/30/2022 12:26 PM    PROT 6.3 09/30/2022 12:26 PM    LABALBU 3.5 09/30/2022 12:26 PM    CALCIUM 8.6 09/30/2022 12:26 PM    BILITOT 1.3 09/30/2022 12:26 PM    ALKPHOS 101 09/30/2022 12:26 PM    AST 22 09/30/2022 12:26 PM     09/30/2022 12:26 PM     BMP:    Lab Results   Component Value Date/Time     09/30/2022 12:26 PM    K 3.8 09/30/2022 12:26 PM     09/30/2022 12:26 PM    CO2 26 09/30/2022 12:26 PM    BUN 5 09/30/2022 12:26 PM    LABALBU 3.5 09/30/2022 12:26 PM    CREATININE 0.41 09/30/2022 12:26 PM    CALCIUM 8.6 09/30/2022 12:26 PM    GFRAA NOT REPORTED 02/01/2022 09:58 PM    LABGLOM  09/30/2022 12:26 PM     Pediatric GFR requires additional information. Refer to [facility] website for calculator.     GLUCOSE 73 09/30/2022 12:26 PM     Hepatic Function Panel:    Lab Results   Component Value Date/Time    ALKPHOS 101 09/30/2022 12:26 PM     09/30/2022 12:26 PM    AST 22 09/30/2022 12:26 PM    PROT 6.3 09/30/2022 12:26 PM    BILITOT 1.3 09/30/2022 12:26 PM    BILIDIR 2.8 09/28/2022 06:57 AM    IBILI 0.38 02/01/2022 09:58 PM    LABALBU 3.5 09/30/2022 12:26 PM     Albumin:    Lab Results   Component Value Date/Time    LABALBU 3.5 09/30/2022 12:26 PM     PT/INR:    Lab Results   Component Value Date/Time    PROTIME 11.2 09/29/2022 04:54 AM    INR 1.1 09/29/2022 04:54 AM     PTT:    Lab Results   Component Value Date/Time    APTT 49.8 09/30/2022 05:05 AM   [APTT}  LIPASE:    Lab Results   Component Value Date/Time    LIPASE 431 09/30/2022 12:26 PM       Radiology Review:   CT ABDOMEN PELVIS W IV CONTRAST Additional Contrast? None     Result Date: 9/27/2022  1. Severe uncomplicated acute pancreatitis, not significantly changed from last month's exam. 2. New mild gallbladder distention without cholelithiasis, gallbladder wall thickening or pericholecystic fluid. This may reflect a prolonged interval since the patient's last meal. However, if there is clinical concern for cholecystitis, further evaluation with a nuclear HIDA scan could be considered. 3. New mild extrahepatic biliary ductal dilatation. If clinical or laboratory findings suggest biliary obstruction, further evaluation with MRCP or ERCP could be considered. US GALLBLADDER RUQ     Result Date: 9/28/2022  1. Incompletely seen pancreas with changes of pancreatitis better demonstrated on CT. 2. Cholelithiasis. Mild gallbladder distention can be seen with cholecystitis, but other secondary sonographic findings of cholecystitis are absent. The wall thickening may be reactive to pancreatitis. Consider further evaluation with a nuclear medicine hepatobiliary scan if there are clinical findings of cholecystitis. 3. Minimal extrahepatic biliary dilation. Consider further evaluation with MRCP if there are clinical findings of cholestasis. 4. Trace perihepatic ascites, likely reactive. 5. Mild hepatic steatosis with minimal fatty sparing along the gallbladder fossa. XR CHEST PORTABLE     Result Date: 9/28/2022  No acute intrathoracic process.    ASSESSMENT:  Active Hospital Problems    Diagnosis Date Noted    Calculus of gallbladder with biliary obstruction but without cholecystitis [K80.21] 09/30/2022     Priority: Medium    Acute pancreatitis without infection or necrosis, unspecified pancreatitis type [K85.90] 09/28/2022     Priority: Medium    Acute pancreatitis, unspecified complication status, unspecified pancreatitis type [K85.90] 08/13/2022     Priority: Medium       25 y.o. male with with history of amphetamine and alcohol abuse presented for gallstone pancreatitis. Plan:  Diet: NPO on mIVF  Hold heparin gtt  Continue pain and nausea control as needed. Continue clindamycin  MRCP reviewed and no evidence of choledocholithiasis. To OR today for laparoscopic cholecystectomy. Risks, benefits, and alternatives discussed with patient and he agrees with treatment plan. Consent obtained and in chart. Appreciate GI recommendations  Cardiology onboard.   Remainder of care and management per primary team    Electronically signed by Cas Brunson MD  on 10/1/2022 at 6:34 AM     Dinora Puckett MD  General Surgery PGY5  Available via [facility]

## 2022-10-01 NOTE — ANESTHESIA POSTPROCEDURE EVALUATION
Department of Anesthesiology  Postprocedure Note    Patient: Kathy Gil  MRN: [id number]  YOB: 2000  Date of evaluation: 10/1/2022      Procedure Summary     Date: 10/01/22 Room / Location: [address]    Anesthesia Start: 8999 Anesthesia Stop: 3343    Procedure: LAPAROSCOPIC CHOLECYSTECTOMY, Diagnosis:       Calculus of gallbladder with acute cholecystitis without obstruction      (ACUTE CHOLECYSTITIS WITH CHOLELITHIASIS)    Surgeons: Alexis Coronel DO Responsible Provider: Nathalie Baltazar MD    Anesthesia Type: General ASA Status: 3          Anesthesia Type: General    Sue Phase I: Sue Score: 9    Sue Phase II:        Anesthesia Post Evaluation    Patient location during evaluation: PACU  Patient participation: complete - patient participated  Level of consciousness: awake  Pain score: 1  Airway patency: patent  Nausea & Vomiting: no nausea and no vomiting  Complications: no  Cardiovascular status: blood pressure returned to baseline and hemodynamically stable  Respiratory status: acceptable  Hydration status: euvolemic

## 2022-10-01 NOTE — OP NOTE
OPERATIVE NOTE    PATIENT: Elia Garay    MRN: [id number]    DATE OF PROCEDURE: 10/1/2022     SURGEON: Alena Nugent DO    ASSISTANT: Shay Ortiz MD    PREOPERATIVE DIAGNOSIS: Gallstone pancreatitis    POSTOPERATIVE DIAGNOSIS: Same     OPERATION: Laparoscopic cholecystectomy    FINDINGS: Inflamed and edematous gallbladder with omental adhesions    ANESTHESIA: General    ESTIMATED BLOOD LOSS:  10 cc  URINE OUTPUT: 0 cc  FLUIDS: 613 cc    COMPLICATIONS: None     IMPLANTS:  * No implants in log *      SPECIMENS:   ID Type Source Tests Collected by Time Destination   A : gallbladder and contents Tissue Gallbladder SURGICAL PATHOLOGY [facility]  10/1/2022 7519         INDICATION: Patient is a 25year old male who presented with abdominal pain and elevated liver enzymes. He was found to have cholelithiasis and pancreatitis with concern for gallstone pancreatitis. His liver enzymes and lipase have improved and MRCP did not show choledocholithiasis. Thus the decision was made to proceed with laparoscopic cholecystectomy. DETAILS OF OPERATION: The patient was seen and evaluated in the preoperative holding area. Risks, benefits, and alternatives of the procedure were discussed with the patient and they agreed with treatment plan. The patient was transferred to the operating theater and transferred to the operating table. Patient was placed in the supine position and all appropriate hemodynamic monitoring and intravenous access was obtained. Appropriate preoperative antibiotics were given. Endotracheal intubation was then performed for general anesthesia. All pressure points were appropriately padded and the abdomen was prepped and draped in the standard sterile fashion. A surgical timeout was performed confirming the patient, procedure to be performed, laterality, and all other pertinent information. Using an 11 blade scalpel, an incision was made above the umbilicus for a 5 mm port. Veress needle was inserted and placement was confirmed with saline drop test.  Pneumoperitoneum was established with CO2 to a pressure of 15 mmHg. A laparoscope was then introduced using Optiview method into the abdomen. The abdomen was inspected for injury and none was identified. Two additional 5 mm ports were placed under direct visualization in the right upper quadrant and one was placed just below the xiphoid. There were some omental adhesions at the fundus and body of the gallbladder which were taken down with a combination of blunt dissection and electrocautery. The gallbladder was noted to be very distended and a laparoscopic syringe was introduced and the gallbladder was drained of 60 cc of bilious fluid. The gallbladder was grasped at the fundus with an atraumatic grasper and elevated over the dome of the liver. The neck of the gallbladder was retracted laterally. The peritoneum was stripped down both medially and laterally and hook electrocautery was used to skeletonize the cystic duct and artery. The critical view of safety was obtained and the cystic duct was clipped distally x1 approximately x2 and was then transected with laparoscopic scissors. The cystic artery was likewise clipped distally x1 and proximally x2 and transected with laparoscopic scissors. The gallbladder was then removed from the cystic plate using Bovie electrocautery. Once the gallbladder was completely removed from the liver, an Endo Catch bag was then introduced through the subxiphoid incision. The liver bed was then suctioned and hemostasis was confirmed. The local area was thoroughly irrigated with saline and suctioned until the effluent was clear. Using a Shahid-Leandro needle, the subxiphoid fascia was then closed. All port sites were locally anesthetized and a tap block was performed. All incisions were closed with running 4-0 Monocryl and Steri-Strips were applied.      The patient was then transferred off the table and to the postoperative care unit. The patient tolerated the procedure well and there were no immediate complications. Counts were correct at case conclusion.

## 2022-10-01 NOTE — ANESTHESIA PRE PROCEDURE
Department of Anesthesiology  Preprocedure Note       Name:  Don Washington   Age:  25 y.o.  :  2000                                          MRN:  [id number]         Date:  10/1/2022      Surgeon: Mary Peck):  Blaek Clemente DO    Procedure: Procedure(s):  LAPAROSCOPIC CHOLECYSTECTOMY,   POSSIBLE OPEN    Medications prior to admission:   Prior to Admission medications    Medication Sig Start Date End Date Taking?  Authorizing Provider   ibuprofen (ADVIL;MOTRIN) 200 MG tablet Take 1,000 mg by mouth every 6 hours as needed for Pain    Historical Provider, MD       Current medications:    Current Facility-Administered Medications   Medication Dose Route Frequency Provider Last Rate Last Admin    sodium chloride flush 0.9 % injection 10 mL  10 mL IntraVENous PRN Chante Bustos DO        nicotine (NICODERM CQ) 21 MG/24HR 1 patch  1 patch TransDERmal Daily ANGEL Rubio CNP   1 patch at 22 0840    clindamycin (CLEOCIN) 600 mg in dextrose 5 % 50 mL IVPB  600 mg IntraVENous Q8H Chante Bustos DO   Stopped at 10/01/22 0105    promethazine (PHENERGAN) injection 6.25 mg  6.25 mg IntraMUSCular Q6H PRN Luvenia Duverney, MD   6.25 mg at 22 0934    dilTIAZem (CARDIZEM CD) extended release capsule 120 mg  120 mg Oral Daily Lis Martinez, APRN - CNP   120 mg at 22 0840    pantoprazole (PROTONIX) 40 mg in sodium chloride (PF) 10 mL injection  40 mg IntraVENous Daily Blake Ahn APRN - CNP   40 mg at 22 0840    traMADol (ULTRAM) tablet 50 mg  50 mg Oral Q4H PRN Jsoe Searing, APRN - CNP   50 mg at 10/01/22 0024    lactated ringers infusion   IntraVENous Continuous Jose Searing, APRN -  mL/hr at 10/01/22 0028 New Bag at 10/01/22 0028    sodium chloride flush 0.9 % injection 5-40 mL  5-40 mL IntraVENous 2 times per day Jose Searing, APRN - CNP   10 mL at 22 2030    sodium chloride flush 0.9 % injection 5-40 mL  5-40 mL IntraVENous PRN Tee Shell, APRN - CNP        0.9 % sodium chloride infusion   IntraVENous PRN Tee Shell, APRN - CNP 5 mL/hr at 10/01/22 0034 New Bag at 10/01/22 0034    potassium chloride 10 mEq/100 mL IVPB (Peripheral Line)  10 mEq IntraVENous PRN Tee Shell, APRN -  mL/hr at 09/29/22 1459 10 mEq at 09/29/22 1459    magnesium sulfate 1000 mg in dextrose 5% 100 mL IVPB  1,000 mg IntraVENous PRN Tee Shell, APRN - CNP   Stopped at 09/28/22 0945    HYDROmorphone (DILAUDID) injection 0.25 mg  0.25 mg IntraVENous Q3H PRN Tee Shell, APRN - CNP   0.25 mg at 10/01/22 0559    Or    HYDROmorphone (DILAUDID) injection 0.5 mg  0.5 mg IntraVENous Q3H PRN Tee Shell, APRN - CNP   0.5 mg at 09/30/22 2330    ondansetron (ZOFRAN-ODT) disintegrating tablet 4 mg  4 mg Oral Q8H PRN Tee Shell, APRN - CNP        Or    [facility] injection 4 mg  4 mg IntraVENous Q6H PRN Tee Shell, APRN - CNP   4 mg at 09/29/22 1273    aspirin chewable tablet 324 mg  324 mg Oral Daily Iam Mendoza MD   324 mg at 09/30/22 0839    fentaNYL (SUBLIMAZE) injection 25 mcg  25 mcg IntraVENous Q1H PRN Iam Mendoza MD   25 mcg at 09/29/22 2451       Allergies:     Allergies   Allergen Reactions    Augmentin [Amoxicillin-Pot Clavulanate]        Problem List:    Patient Active Problem List   Diagnosis Code    Acute pancreatitis, unspecified complication status, unspecified pancreatitis type K85.90    Alcohol induced acute pancreatitis without necrosis or infection K85.20    Gallstone pancreatitis K85.10    Pain of upper abdomen R10.10    Elevated LFTs R79.89    Acute pancreatitis without infection or necrosis, unspecified pancreatitis type K85.90    Calculus of gallbladder with biliary obstruction but without cholecystitis K80.21       Past Medical History:        Diagnosis Date    Drug abuse ([facility] [place] [address]     Gallstone     Pancreatitis        Past Surgical History:        Procedure Laterality Date    TYMPANOSTOMY TUBE PLACEMENT         Social History:    Social History     Tobacco Use    Smoking status: Every Day     Packs/day: 1.00     Years: 4.00     Pack years: 4.00     Types: Cigarettes     Start date: 2017    Smokeless tobacco: Never   Substance Use Topics    Alcohol use: Not Currently     Comment: last drank a week ago. Ready to quit: Not Answered  Counseling given: Not Answered      Vital Signs (Current):   Vitals:    09/30/22 1601 09/30/22 1957 09/30/22 2340 10/01/22 0411   BP: (!) 144/84 131/76 119/69 (!) 118/51   Pulse: (!) 102 92 86 69   Resp: 18 18 17 14   Temp: 99.2 °F (37.3 °C) 98.8 °F (37.1 °C) 98.5 °F (36.9 °C) 98.6 °F (37 °C)   TempSrc: Oral Oral Oral Axillary   SpO2: 97% 96% 97% 94%   Weight:                                                  BP Readings from Last 3 Encounters:   10/01/22 (!) 118/51   09/28/22 (!) 153/105   09/24/22 (!) 101/59       NPO Status:                                                                                 BMI:   Wt Readings from Last 3 Encounters:   09/29/22 206 lb 12.7 oz (93.8 kg) (94 %, Z= 1.57)*   09/27/22 190 lb (86.2 kg) (88 %, Z= 1.15)*   09/24/22 190 lb (86.2 kg) (88 %, Z= 1.15)*     * Growth percentiles are based on CDC (Boys, 2-20 Years) data. Body mass index is 26.55 kg/m².     CBC:   Lab Results   Component Value Date/Time    WBC 10.1 09/30/2022 12:26 PM    RBC 4.70 09/30/2022 12:26 PM    HGB 14.1 09/30/2022 12:26 PM    HCT 41.4 09/30/2022 12:26 PM    MCV 88.1 09/30/2022 12:26 PM    RDW 12.9 09/30/2022 12:26 PM    PLT See Reflexed IPF Result 09/30/2022 12:26 PM       CMP:   Lab Results   Component Value Date/Time     09/30/2022 12:26 PM    K 3.8 09/30/2022 12:26 PM     09/30/2022 12:26 PM    CO2 26 09/30/2022 12:26 PM    BUN 5 09/30/2022 12:26 PM    CREATININE 0.41 09/30/2022 12:26 PM    GFRAA NOT REPORTED 02/01/2022 09:58 PM    LABGLOM  09/30/2022 12:26 PM     Pediatric GFR requires additional information. Refer to [facility] website for calculator. GLUCOSE 73 09/30/2022 12:26 PM    PROT 6.3 09/30/2022 12:26 PM    CALCIUM 8.6 09/30/2022 12:26 PM    BILITOT 1.3 09/30/2022 12:26 PM    ALKPHOS 101 09/30/2022 12:26 PM    AST 22 09/30/2022 12:26 PM     09/30/2022 12:26 PM       POC Tests: No results for input(s): POCGLU, POCNA, POCK, POCCL, POCBUN, POCHEMO, POCHCT in the last 72 hours. Coags:   Lab Results   Component Value Date/Time    PROTIME 11.2 09/29/2022 04:54 AM    INR 1.1 09/29/2022 04:54 AM    APTT 49.8 09/30/2022 05:05 AM       HCG (If Applicable): No results found for: PREGTESTUR, PREGSERUM, HCG, HCGQUANT     ABGs: No results found for: PHART, PO2ART, EGP0AXG, YQA7OMU, BEART, U2AYSLAS     Type & Screen (If Applicable):  No results found for: LABABO, LABRH    Drug/Infectious Status (If Applicable):  No results found for: HIV, HEPCAB    COVID-19 Screening (If Applicable):   Lab Results   Component Value Date/Time    COVID19 Not Detected 09/27/2022 10:32 PM    COVID19 DETECTED 08/16/2021 05:22 PM           Anesthesia Evaluation  Patient summary reviewed no history of anesthetic complications:   Airway: Mallampati: II          Dental:          Pulmonary:Negative Pulmonary ROS and normal exam  breath sounds clear to auscultation                             Cardiovascular:Negative CV ROS  Exercise tolerance: good (>4 METS),           Rhythm: regular  Rate: normal                    Neuro/Psych:   Negative Neuro/Psych ROS              GI/Hepatic/Renal:            ROS comment: Acute pancreatitis, unspecified complication status, unspecified pancreatitis type  Alcohol induced acute pancreatitis without necrosis or infection  Gallstone pancreatitis  Pain of upper abdomen  Elevated LFTs  Acute pancreatitis without infection or necrosis, unspecified pancreatitis type  Calculus of gallbladder with biliary obstruction but without cholecystitis    . Endo/Other: Negative Endo/Other ROS                    Abdominal:             Vascular: Other Findings:           Anesthesia Plan      general     ASA 3       Induction: intravenous. MIPS: Postoperative trial extubation. Anesthetic plan and risks discussed with patient. Plan discussed with CRNA. ASSESSMENT:        Active Hospital Problems     Diagnosis Date Noted    Calculus of gallbladder with biliary obstruction but without cholecystitis [K80.21] 09/30/2022       Priority: Medium    Acute pancreatitis without infection or necrosis, unspecified pancreatitis type [K85.90] 09/28/2022       Priority: Medium    Acute pancreatitis, unspecified complication status, unspecified pancreatitis type [K85.90] 08/13/2022       Priority: Medium         25 y.o. male with with history of amphetamine and alcohol abuse presented for gallstone pancreatitis.     Plan:  1. Diet: NPO on mIVF  2. Hold heparin gtt  3. Continue pain and nausea control as needed. 4. Continue clindamycin  5. MRCP reviewed and no evidence of choledocholithiasis. 6. To OR today for laparoscopic cholecystectomy. Risks, benefits, and alternatives discussed with patient and he agrees with treatment plan. Consent obtained and in chart. 7. Appreciate GI recommendations  8. Cardiology onboard.   9. Remainder of care and management per primary team        Ricardo Ayala MD   10/1/2022

## 2022-10-02 VITALS
SYSTOLIC BLOOD PRESSURE: 118 MMHG | OXYGEN SATURATION: 98 % | BODY MASS INDEX: 26.55 KG/M2 | WEIGHT: 206.79 LBS | HEART RATE: 85 BPM | RESPIRATION RATE: 16 BRPM | DIASTOLIC BLOOD PRESSURE: 58 MMHG | TEMPERATURE: 98.6 F

## 2022-10-02 PROCEDURE — 2580000003 HC RX 258: Performed by: STUDENT IN AN ORGANIZED HEALTH CARE EDUCATION/TRAINING PROGRAM

## 2022-10-02 PROCEDURE — 6370000000 HC RX 637 (ALT 250 FOR IP): Performed by: FAMILY MEDICINE

## 2022-10-02 PROCEDURE — 6370000000 HC RX 637 (ALT 250 FOR IP): Performed by: STUDENT IN AN ORGANIZED HEALTH CARE EDUCATION/TRAINING PROGRAM

## 2022-10-02 PROCEDURE — 6360000002 HC RX W HCPCS: Performed by: STUDENT IN AN ORGANIZED HEALTH CARE EDUCATION/TRAINING PROGRAM

## 2022-10-02 PROCEDURE — 2500000003 HC RX 250 WO HCPCS: Performed by: STUDENT IN AN ORGANIZED HEALTH CARE EDUCATION/TRAINING PROGRAM

## 2022-10-02 PROCEDURE — 2580000003 HC RX 258: Performed by: SURGERY

## 2022-10-02 PROCEDURE — 99239 HOSP IP/OBS DSCHRG MGMT >30: CPT | Performed by: FAMILY MEDICINE

## 2022-10-02 PROCEDURE — C9113 INJ PANTOPRAZOLE SODIUM, VIA: HCPCS | Performed by: STUDENT IN AN ORGANIZED HEALTH CARE EDUCATION/TRAINING PROGRAM

## 2022-10-02 RX ORDER — DILTIAZEM HYDROCHLORIDE 120 MG/1
120 CAPSULE, COATED, EXTENDED RELEASE ORAL DAILY
Qty: 30 CAPSULE | Refills: 3 | Status: SHIPPED | OUTPATIENT
Start: 2022-10-02

## 2022-10-02 RX ORDER — TRAMADOL HYDROCHLORIDE 50 MG/1
50 TABLET ORAL EVERY 8 HOURS PRN
Qty: 6 TABLET | Refills: 0 | Status: SHIPPED | OUTPATIENT
Start: 2022-10-02 | End: 2022-10-05

## 2022-10-02 RX ORDER — SIMETHICONE 80 MG
80 TABLET,CHEWABLE ORAL 4 TIMES DAILY
Qty: 180 TABLET | Refills: 3 | Status: SHIPPED | OUTPATIENT
Start: 2022-10-02

## 2022-10-02 RX ADMIN — SODIUM CHLORIDE, PRESERVATIVE FREE 40 MG: 5 INJECTION INTRAVENOUS at 08:43

## 2022-10-02 RX ADMIN — TRAMADOL HYDROCHLORIDE 50 MG: 50 TABLET, COATED ORAL at 04:26

## 2022-10-02 RX ADMIN — DILTIAZEM HYDROCHLORIDE 120 MG: 120 CAPSULE, COATED, EXTENDED RELEASE ORAL at 08:42

## 2022-10-02 RX ADMIN — SIMETHICONE 80 MG: 80 TABLET, CHEWABLE ORAL at 08:47

## 2022-10-02 RX ADMIN — TRAMADOL HYDROCHLORIDE 50 MG: 50 TABLET, COATED ORAL at 08:44

## 2022-10-02 RX ADMIN — CLINDAMYCIN IN 5 PERCENT DEXTROSE 600 MG: 12 INJECTION, SOLUTION INTRAVENOUS at 08:51

## 2022-10-02 RX ADMIN — ASPIRIN 81 MG 324 MG: 81 TABLET ORAL at 08:42

## 2022-10-02 RX ADMIN — CLINDAMYCIN IN 5 PERCENT DEXTROSE 600 MG: 12 INJECTION, SOLUTION INTRAVENOUS at 00:54

## 2022-10-02 RX ADMIN — SODIUM CHLORIDE, POTASSIUM CHLORIDE, SODIUM LACTATE AND CALCIUM CHLORIDE: 600; 310; 30; 20 INJECTION, SOLUTION INTRAVENOUS at 07:07

## 2022-10-02 RX ADMIN — SODIUM CHLORIDE, PRESERVATIVE FREE 10 ML: 5 INJECTION INTRAVENOUS at 08:47

## 2022-10-02 ASSESSMENT — PAIN SCALES - GENERAL
PAINLEVEL_OUTOF10: 9
PAINLEVEL_OUTOF10: 5

## 2022-10-02 ASSESSMENT — PAIN DESCRIPTION - LOCATION: LOCATION: ABDOMEN

## 2022-10-02 NOTE — CARE COORDINATION
Discharge [facility] Case Management Department  Written by: Fariba Elam RN    Patient Name: Tiffanie Lomeli  Attending Provider: Mena Sood MD  Admit Date: 2022  2:58 AM  MRN: [id number]  Account: [de-identified]                     : 2000  Discharge Date: 10/2/22      Disposition: home    Fariba Elam RN

## 2022-10-02 NOTE — PLAN OF CARE
Problem: Discharge Planning  Goal: Discharge to home or other facility with appropriate resources  10/2/2022 0947 by Hitesh Hernandez RN  Outcome: Progressing     Problem: Pain  Goal: Verbalizes/displays adequate comfort level or baseline comfort level  10/2/2022 0947 by Hitesh Hernandez RN  Outcome: Progressing     Problem: Safety - Adult  Goal: Free from fall injury  10/2/2022 0947 by Hitesh Hernandez RN  Outcome: Progressing

## 2022-10-02 NOTE — PROGRESS NOTES
Pt is discharged;writer went over discharge instructions with pt & his girlfriend;questions& concerns addressed;per Dr. Ernestina Aden instructions pt received a letter for work which included the dates of his admission;PIVs x 3 removed without any complications;catheter tips intact

## 2022-10-02 NOTE — PLAN OF CARE
Problem: Discharge Planning  Goal: Discharge to home or other facility with appropriate resources  10/1/2022 2311 by FAITH PERALTA  Outcome: Progressing  Flowsheets (Taken 10/1/2022 2311)  Discharge to home or other facility with appropriate resources: Identify barriers to discharge with patient and caregiver  10/1/2022 1752 by Toño Webster RN  Outcome: Progressing     Problem: Pain  Goal: Verbalizes/displays adequate comfort level or baseline comfort level  10/1/2022 2311 by FAITH PERALTA  Outcome: Progressing  Flowsheets (Taken 10/1/2022 2311)  Verbalizes/displays adequate comfort level or baseline comfort level: Encourage patient to monitor pain and request assistance  10/1/2022 1752 by Toño Webster RN  Outcome: Progressing     Problem: Safety - Adult  Goal: Free from fall injury  10/1/2022 2311 by FAITH PERALTA  Outcome: Progressing  10/1/2022 1752 by Toño Webster RN  Outcome: Progressing     Problem: ABCDS Injury Assessment  Goal: Absence of physical injury  10/1/2022 2311 by FAITH PERALTA  Outcome: Progressing  Flowsheets (Taken 10/1/2022 2311)  Absence of Physical Injury: Implement safety measures based on patient assessment  10/1/2022 1752 by Toño Webster RN  Outcome: Progressing

## 2022-10-02 NOTE — PROGRESS NOTES
General Surgery:  Daily Progress Note            PATIENT NAME: Max Ochoa     TODAY'S DATE: 10/2/2022, 6:53 AM  CC:  Pt c/o of periumbilical pain. SUBJECTIVE:     Patient seen and evaluated. No acute events overnight. Afebrile, normal sinus rhythm, normotensive, satting greater than 95% on room air. Voiding with good urine output. Pain controlled. Tolerating clear liquid diet. OBJECTIVE:   VITALS:  BP (!) 149/98   Pulse 73   Temp 97.7 °F (36.5 °C) (Oral)   Resp 18   Wt 206 lb 12.7 oz (93.8 kg)   SpO2 99%   BMI 26.55 kg/m²      INTAKE/OUTPUT:      Intake/Output Summary (Last 24 hours) at 10/2/2022 0524  Last data filed at 10/2/2022 0500  Gross per 24 hour   Intake 1250 ml   Output 1455 ml   Net -205 ml       PHYSICAL EXAM:  General Appearance: in no acute distress, resting comfortably in bed  Heart: Heart sounds are normal.  Regular rate and rhythm. Lungs: Normal expansion. Abdomen: Appropriately tender to palpation around incisions, incisions well approximated with Steri-Strips in place. Extremities: Moves all extremities x4.      Data:  CBC with Differential:    Lab Results   Component Value Date/Time    WBC 10.1 09/30/2022 12:26 PM    RBC 4.70 09/30/2022 12:26 PM    HGB 14.1 09/30/2022 12:26 PM    HCT 41.4 09/30/2022 12:26 PM    PLT See Reflexed IPF Result 09/30/2022 12:26 PM    MCV 88.1 09/30/2022 12:26 PM    MCH 30.0 09/30/2022 12:26 PM    MCHC 34.1 09/30/2022 12:26 PM    RDW 12.9 09/30/2022 12:26 PM    LYMPHOPCT 19 09/30/2022 12:26 PM    MONOPCT 7 09/30/2022 12:26 PM    BASOPCT 0 09/30/2022 12:26 PM    MONOSABS 0.75 09/30/2022 12:26 PM    LYMPHSABS 1.91 09/30/2022 12:26 PM    EOSABS 0.10 09/30/2022 12:26 PM    BASOSABS 0.04 09/30/2022 12:26 PM    DIFFTYPE YES 09/27/2022 10:26 PM     CMP:    Lab Results   Component Value Date/Time     09/30/2022 12:26 PM    K 3.8 09/30/2022 12:26 PM     09/30/2022 12:26 PM    CO2 26 09/30/2022 12:26 PM    BUN 5 09/30/2022 12:26 PM CREATININE 0.41 09/30/2022 12:26 PM    GFRAA NOT REPORTED 02/01/2022 09:58 PM    LABGLOM  09/30/2022 12:26 PM     Pediatric GFR requires additional information. Refer to [facility] website for calculator. GLUCOSE 73 09/30/2022 12:26 PM    PROT 6.3 09/30/2022 12:26 PM    LABALBU 3.5 09/30/2022 12:26 PM    CALCIUM 8.6 09/30/2022 12:26 PM    BILITOT 1.3 09/30/2022 12:26 PM    ALKPHOS 101 09/30/2022 12:26 PM    AST 22 09/30/2022 12:26 PM     09/30/2022 12:26 PM     BMP:    Lab Results   Component Value Date/Time     09/30/2022 12:26 PM    K 3.8 09/30/2022 12:26 PM     09/30/2022 12:26 PM    CO2 26 09/30/2022 12:26 PM    BUN 5 09/30/2022 12:26 PM    LABALBU 3.5 09/30/2022 12:26 PM    CREATININE 0.41 09/30/2022 12:26 PM    CALCIUM 8.6 09/30/2022 12:26 PM    GFRAA NOT REPORTED 02/01/2022 09:58 PM    LABGLOM  09/30/2022 12:26 PM     Pediatric GFR requires additional information. Refer to [facility] website for calculator. GLUCOSE 73 09/30/2022 12:26 PM     Hepatic Function Panel:    Lab Results   Component Value Date/Time    ALKPHOS 101 09/30/2022 12:26 PM     09/30/2022 12:26 PM    AST 22 09/30/2022 12:26 PM    PROT 6.3 09/30/2022 12:26 PM    BILITOT 1.3 09/30/2022 12:26 PM    BILIDIR 2.8 09/28/2022 06:57 AM    IBILI 0.38 02/01/2022 09:58 PM    LABALBU 3.5 09/30/2022 12:26 PM     Albumin:    Lab Results   Component Value Date/Time    LABALBU 3.5 09/30/2022 12:26 PM     PT/INR:    Lab Results   Component Value Date/Time    PROTIME 11.2 09/29/2022 04:54 AM    INR 1.1 09/29/2022 04:54 AM     PTT:    Lab Results   Component Value Date/Time    APTT 49.8 09/30/2022 05:05 AM   [APTT}  LIPASE:    Lab Results   Component Value Date/Time    LIPASE 431 09/30/2022 12:26 PM       Radiology Review:   CT ABDOMEN PELVIS W IV CONTRAST Additional Contrast? None     Result Date: 9/27/2022  1.  Severe uncomplicated acute pancreatitis, not significantly changed from last month's exam. 2. New mild gallbladder distention without cholelithiasis, gallbladder wall thickening or pericholecystic fluid. This may reflect a prolonged interval since the patient's last meal. However, if there is clinical concern for cholecystitis, further evaluation with a nuclear HIDA scan could be considered. 3. New mild extrahepatic biliary ductal dilatation. If clinical or laboratory findings suggest biliary obstruction, further evaluation with MRCP or ERCP could be considered. US GALLBLADDER RUQ     Result Date: 9/28/2022  1. Incompletely seen pancreas with changes of pancreatitis better demonstrated on CT. 2. Cholelithiasis. Mild gallbladder distention can be seen with cholecystitis, but other secondary sonographic findings of cholecystitis are absent. The wall thickening may be reactive to pancreatitis. Consider further evaluation with a nuclear medicine hepatobiliary scan if there are clinical findings of cholecystitis. 3. Minimal extrahepatic biliary dilation. Consider further evaluation with MRCP if there are clinical findings of cholestasis. 4. Trace perihepatic ascites, likely reactive. 5. Mild hepatic steatosis with minimal fatty sparing along the gallbladder fossa. XR CHEST PORTABLE     Result Date: 9/28/2022  No acute intrathoracic process. ASSESSMENT:  Active Hospital Problems    Diagnosis Date Noted    Calculus of gallbladder with biliary obstruction but without cholecystitis [K80.21] 09/30/2022     Priority: Medium    Acute pancreatitis without infection or necrosis, unspecified pancreatitis type [K85.90] 09/28/2022     Priority: Medium    Acute pancreatitis, unspecified complication status, unspecified pancreatitis type [K85.90] 08/13/2022     Priority: Medium       25 y.o. male with with history of amphetamine and alcohol abuse presented for gallstone pancreatitis.     Plan:  -Pain and nausea control as needed  -Okay to resume AC from surgery standpoint  -Advance to low-fat regular diet  -Cardiology onboard  -Okay for discharge from general surgery standpoint  -Remainder of care and management per primary team    Electronically signed by Renetta Cameron MD  on 10/2/2022 at 6:53 AM     Slade Delgadillo MD  General Surgery PGY5  Available via [address] [place] [address]

## 2022-10-02 NOTE — DISCHARGE SUMMARY
[facility]  Office: [address], DO, Marlena Denny, DO, Carlos West, DO, Nadege Nguyen Blood, DO, Malcolm Mckeon MD, Sujatha Kang MD, Irasema Hutchison MD, Basil Cabello MD,  Jaylene Brandt MD, Renay Frey MD, Nini Larose, DO, Lizet Blackburn MD,  Misty Klein MD, Danica Mcgovern MD, Latisha Bhakta DO, Lyly Jon MD, Anthony Levi MD, Heri Chakraborty MD, Derrick Bunn MD, Margaret Vaca MD, Galdino Ragsdale MD, Orville Maddox, DO, Dipak Mccarthy MD, aCssie Huang MD, Rachelle Coates, CNP,  Aurea Hazelo, CNP, Severiano Baptist, [facility], Paola Mims, CNP,  Bill Griggs, [facility], Eloisa Hu, CNP, Sheree Wylie, CNP, Kandice Baxter, CNP, Sharda Rose, CNP, Ayla Hightower, [facility], Shefali Dudley PA-VINNIE, Melody Adamson, CNS, Katiuska Fairbanks, [facility], Christiana Quintanilla, CNP, Hill Pennington, CNP, Joseph Adhikari, [facility]    Discharge Summary     Patient ID: Elia Garay  :  2000   MRN: [id number]     ACCOUNT:  [de-identified]   Patient's PCP: CHAPO Zamora  Admit Date: 2022   Discharge Date: 10/2/2022   Length of Stay: 4  Code Status:  Prior  Admitting Physician: Basil Cabello MD  Discharge Physician: Basil Cabello MD     Active Discharge Diagnoses:     Hospital Problem Lists:  Principal Problem:    Acute pancreatitis without infection or necrosis, unspecified pancreatitis type  Active Problems:    Acute pancreatitis, unspecified complication status, unspecified pancreatitis type    Calculus of gallbladder with biliary obstruction but without cholecystitis  Resolved Problems:    * No resolved hospital problems. *      Admission Condition:  poor     Discharged Condition: good    Hospital Stay:     HPI:       Per my partner  Elia Garay is a 25 y.o.  Yo male with  hx of pancreatitis and cholecystitis, Drug abuse including methamphetamine a few days before presentation who has been having ~ one year of  intermittent episodes of abdominal pain pancreatitis and gallstone problems for over a year who presents with significant epigastric and RUQ  pain for 4 days. Patient reports inabilityh to take in any PO during this time. Does admit to methamphetamines according to the staff he used a couple days ago. CT with contrast on admission showed Severe uncomplicated acute pancreatitis, not significantly changed from last   month's exam.New mild gallbladder distention without cholelithiasis, gallbladder wall  thickening or pericholecystic fluid, New mild extrahepatic biliary ductal dilatation. Labs showed lipase 1400, elevated lFTs and biliribin . He was [facility] symptomaticly and given IV meropenem and transferred [facility] for admission. During the admission patient was managed as follow    Acute on recurrent gallstone pancreatitis/ cholelithiasis/cholecystitis: Patient with known alcohol use as well, lipase is coming down, continue aggressive hydration  MRCP no choledocholithiasis  S/p lap juan on 10/1  Evaluated by GI as well     New onset A. fib in a patient with alcohol use and drug use [positive for methamphetamine on admission]: Evaluated by cardiology and placed on heparin drip amiodarone Cardizem drip for rate control. Been on normal sinus rhythm since then . Echo is normal, DC heparin drip     Discussed with the patient and the nurse  Continue to monitor     Discharge plan later today or in a.m.        Significant therapeutic interventions: as above     Significant Diagnostic Studies:   Labs / Micro:  CBC:   Lab Results   Component Value Date/Time    WBC 10.1 09/30/2022 12:26 PM    RBC 4.70 09/30/2022 12:26 PM    HGB 14.1 09/30/2022 12:26 PM    HCT 41.4 09/30/2022 12:26 PM    MCV 88.1 09/30/2022 12:26 PM    MCH 30.0 09/30/2022 12:26 PM    MCHC 34.1 09/30/2022 12:26 PM    RDW 12.9 09/30/2022 12:26 PM    PLT See Reflexed IPF Result 09/30/2022 12:26 PM     BMP: Lab Results   Component Value Date/Time    GLUCOSE 73 09/30/2022 12:26 PM     09/30/2022 12:26 PM    K 3.8 09/30/2022 12:26 PM     09/30/2022 12:26 PM    CO2 26 09/30/2022 12:26 PM    ANIONGAP 10 09/30/2022 12:26 PM    BUN 5 09/30/2022 12:26 PM    CREATININE 0.41 09/30/2022 12:26 PM    BUNCRER 38 09/27/2022 10:26 PM    CALCIUM 8.6 09/30/2022 12:26 PM    LABGLOM  09/30/2022 12:26 PM     Pediatric GFR requires additional information. Refer to [facility] website for calculator. GFRAA NOT REPORTED 02/01/2022 09:58 PM    GFR      09/30/2022 12:26 PM     HFP:    Lab Results   Component Value Date/Time    PROT 6.3 09/30/2022 12:26 PM     CMP:    Lab Results   Component Value Date/Time    GLUCOSE 73 09/30/2022 12:26 PM     09/30/2022 12:26 PM    K 3.8 09/30/2022 12:26 PM     09/30/2022 12:26 PM    CO2 26 09/30/2022 12:26 PM    BUN 5 09/30/2022 12:26 PM    CREATININE 0.41 09/30/2022 12:26 PM    ANIONGAP 10 09/30/2022 12:26 PM    ALKPHOS 101 09/30/2022 12:26 PM     09/30/2022 12:26 PM    AST 22 09/30/2022 12:26 PM    BILITOT 1.3 09/30/2022 12:26 PM    LABALBU 3.5 09/30/2022 12:26 PM    ALBUMIN 1.3 09/30/2022 12:26 PM    LABGLOM  09/30/2022 12:26 PM     Pediatric GFR requires additional information. Refer to [facility] website for calculator.     GFRAA NOT REPORTED 02/01/2022 09:58 PM    GFR      09/30/2022 12:26 PM    PROT 6.3 09/30/2022 12:26 PM    CALCIUM 8.6 09/30/2022 12:26 PM     PT/INR:    Lab Results   Component Value Date/Time    PROTIME 11.2 09/29/2022 04:54 AM    INR 1.1 09/29/2022 04:54 AM     PTT:   Lab Results   Component Value Date/Time    APTT 49.8 09/30/2022 05:05 AM     FLP:    Lab Results   Component Value Date/Time    TRIG 83 09/29/2022 04:54 AM     U/A:    Lab Results   Component Value Date/Time    COLORU Yellow 06/22/2022 06:41 PM    TURBIDITY Clear 06/22/2022 06:41 PM    SPECGRAV 1.010 06/22/2022 06:41 PM    HGBUR NEGATIVE 06/22/2022 06:41 PM    PHUR 8.0 06/22/2022 06:41 PM PROTEINU NEGATIVE 06/22/2022 06:41 PM    GLUCOSEU NEGATIVE 06/22/2022 06:41 PM    KETUA NEGATIVE 06/22/2022 06:41 PM    BILIRUBINUR NEGATIVE 06/22/2022 06:41 PM    UROBILINOGEN Normal 06/22/2022 06:41 PM    NITRU NEGATIVE 06/22/2022 06:41 PM    LEUKOCYTESUR NEGATIVE 06/22/2022 06:41 PM     TSH:    Lab Results   Component Value Date/Time    TSH 0.74 09/28/2022 03:21 AM        Radiology:  CT ABDOMEN PELVIS W IV CONTRAST Additional Contrast? None    Result Date: 9/27/2022  1. Severe uncomplicated acute pancreatitis, not significantly changed from last month's exam. 2. New mild gallbladder distention without cholelithiasis, gallbladder wall thickening or pericholecystic fluid. This may reflect a prolonged interval since the patient's last meal. However, if there is clinical concern for cholecystitis, further evaluation with a nuclear HIDA scan could be considered. 3. New mild extrahepatic biliary ductal dilatation. If clinical or laboratory findings suggest biliary obstruction, further evaluation with MRCP or ERCP could be considered. US GALLBLADDER RUQ    Result Date: 9/28/2022  1. Incompletely seen pancreas with changes of pancreatitis better demonstrated on CT. 2. Cholelithiasis. Mild gallbladder distention can be seen with cholecystitis, but other secondary sonographic findings of cholecystitis are absent. The wall thickening may be reactive to pancreatitis. Consider further evaluation with a nuclear medicine hepatobiliary scan if there are clinical findings of cholecystitis. 3. Minimal extrahepatic biliary dilation. Consider further evaluation with MRCP if there are clinical findings of cholestasis. 4. Trace perihepatic ascites, likely reactive. 5. Mild hepatic steatosis with minimal fatty sparing along the gallbladder fossa. XR CHEST PORTABLE    Result Date: 9/28/2022  No acute intrathoracic process. MRI ABDOMEN W WO CONTRAST MRCP    Result Date: 9/30/2022  Cholelithiasis.   No definite Medication List        START taking these medications      dilTIAZem 120 MG extended release capsule  Commonly known as: CARDIZEM CD  Take 1 capsule by mouth daily     simethicone 80 MG chewable tablet  Commonly known as: MYLICON  Take 1 tablet by mouth 4 times daily     traMADol 50 MG tablet  Commonly known as: ULTRAM  Take 1 tablet by mouth every 8 hours as needed for Pain for up to 3 days. STOP taking these medications      ibuprofen 200 MG tablet  Commonly known as: ADVIL;MOTRIN               Where to Get Your Medications        These medications were sent to [facility] [address] [facility], [address] [facility]  [address], [address] [place] [address]      Phone: [phone]   dilTIAZem 120 MG extended release capsule  simethicone 80 MG chewable tablet  traMADol 50 MG tablet         No discharge procedures on file. Time Spent on discharge is  35 mins in patient examination, evaluation, counseling as well as medication reconciliation, prescriptions for required medications, discharge plan and follow up. Electronically signed by   Carmen Hurtado MD  10/2/2022  3:45 PM      Thank you Dr. Greer Cabrera PA for the opportunity to be involved in this patient's care.

## 2022-10-03 LAB
CULTURE: NORMAL
Lab: NORMAL
SPECIMEN DESCRIPTION: NORMAL

## 2022-10-04 LAB — SURGICAL PATHOLOGY REPORT: NORMAL

## 2022-10-05 ENCOUNTER — TELEPHONE (OUTPATIENT)
Dept: GASTROENTEROLOGY | Age: 22
End: 2022-10-05

## 2022-10-05 NOTE — TELEPHONE ENCOUNTER
Spoke with Rad Amin from PCP office:     She stated that patient reassured her that somewhere along in discharge for hospital stay, they wanted patient to follow up with PCP, as he lived in [facility]. PCP wanted to verify this was correct, and discharge had ANDREA Cuadra listed. PCP also need to know when to patient may return to work. Please advise if you can you speak on this or would like to see patient in clinic to advise       [phone] opt 4.

## 2022-10-13 ENCOUNTER — TELEPHONE (OUTPATIENT)
Dept: BARIATRICS/WEIGHT MGMT | Age: 22
End: 2022-10-13

## 2022-12-09 ENCOUNTER — HOSPITAL ENCOUNTER (EMERGENCY)
Age: 22
Discharge: HOME | End: 2022-12-09
Attending: EMERGENCY MEDICINE
Payer: MEDICAID

## 2022-12-09 VITALS
HEART RATE: 99 BPM | DIASTOLIC BLOOD PRESSURE: 93 MMHG | BODY MASS INDEX: 26.11 KG/M2 | TEMPERATURE: 98.4 F | WEIGHT: 197 LBS | OXYGEN SATURATION: 100 % | SYSTOLIC BLOOD PRESSURE: 155 MMHG | RESPIRATION RATE: 20 BRPM | HEIGHT: 73 IN

## 2022-12-09 DIAGNOSIS — E87.20 LACTIC ACIDOSIS: ICD-10-CM

## 2022-12-09 DIAGNOSIS — D72.829 LEUKOCYTOSIS, UNSPECIFIED TYPE: ICD-10-CM

## 2022-12-09 DIAGNOSIS — E86.0 DEHYDRATION: ICD-10-CM

## 2022-12-09 DIAGNOSIS — R19.7 NAUSEA VOMITING AND DIARRHEA: Primary | ICD-10-CM

## 2022-12-09 DIAGNOSIS — R11.2 NAUSEA VOMITING AND DIARRHEA: Primary | ICD-10-CM

## 2022-12-09 LAB
ABSOLUTE EOS #: 0 K/UL (ref 0–0.4)
ABSOLUTE LYMPH #: 2.5 K/UL (ref 1.2–5.2)
ABSOLUTE MONO #: 0.2 K/UL (ref 0–1)
ALBUMIN SERPL-MCNC: 5 G/DL (ref 3.5–5.2)
ALP BLD-CCNC: 81 U/L (ref 40–129)
ALT SERPL-CCNC: 23 U/L (ref 5–41)
ANION GAP SERPL CALCULATED.3IONS-SCNC: 18 MMOL/L (ref 9–17)
AST SERPL-CCNC: 18 U/L
BASOPHILS # BLD: 1 % (ref 0–2)
BASOPHILS ABSOLUTE: 0.1 K/UL (ref 0–0.2)
BILIRUB SERPL-MCNC: 0.9 MG/DL (ref 0.3–1.2)
BUN BLDV-MCNC: 20 MG/DL (ref 6–20)
BUN/CREAT BLD: 28 (ref 9–20)
CALCIUM SERPL-MCNC: 9.9 MG/DL (ref 8.6–10.4)
CHLORIDE BLD-SCNC: 102 MMOL/L (ref 98–107)
CO2: 21 MMOL/L (ref 20–31)
CREAT SERPL-MCNC: 0.72 MG/DL (ref 0.7–1.2)
DIFFERENTIAL TYPE: YES
EOSINOPHILS RELATIVE PERCENT: 0 % (ref 0–5)
GFR SERPL CREATININE-BSD FRML MDRD: >60 ML/MIN/1.73M2
GLUCOSE BLD-MCNC: 150 MG/DL (ref 70–99)
HCT VFR BLD CALC: 50.8 % (ref 41–53)
HEMOGLOBIN: 16.9 G/DL (ref 13.5–17.5)
LACTIC ACID: 1 MMOL/L (ref 0.5–2.2)
LACTIC ACID: 3.1 MMOL/L (ref 0.5–2.2)
LYMPHOCYTES # BLD: 14 % (ref 13–44)
MCH RBC QN AUTO: 29.2 PG (ref 26–34)
MCHC RBC AUTO-ENTMCNC: 33.3 G/DL (ref 31–37)
MCV RBC AUTO: 87.7 FL (ref 80–100)
MONOCYTES # BLD: 1 % (ref 5–9)
PDW BLD-RTO: 13.3 % (ref 12.1–15.2)
PLATELET # BLD: 235 K/UL (ref 140–450)
POTASSIUM SERPL-SCNC: 4.1 MMOL/L (ref 3.7–5.3)
RBC # BLD: 5.79 M/UL (ref 4.5–5.9)
SEG NEUTROPHILS: 84 % (ref 39–75)
SEGMENTED NEUTROPHILS ABSOLUTE COUNT: 15.2 K/UL (ref 2.1–6.5)
SODIUM BLD-SCNC: 141 MMOL/L (ref 135–144)
TOTAL PROTEIN: 8.2 G/DL (ref 6.4–8.3)
WBC # BLD: 18 K/UL (ref 4.5–13.5)

## 2022-12-09 PROCEDURE — 36415 COLL VENOUS BLD VENIPUNCTURE: CPT

## 2022-12-09 PROCEDURE — 96361 HYDRATE IV INFUSION ADD-ON: CPT

## 2022-12-09 PROCEDURE — 99284 EMERGENCY DEPT VISIT MOD MDM: CPT

## 2022-12-09 PROCEDURE — 2580000003 HC RX 258: Performed by: EMERGENCY MEDICINE

## 2022-12-09 PROCEDURE — 87040 BLOOD CULTURE FOR BACTERIA: CPT

## 2022-12-09 PROCEDURE — 96374 THER/PROPH/DIAG INJ IV PUSH: CPT

## 2022-12-09 PROCEDURE — 83605 ASSAY OF LACTIC ACID: CPT

## 2022-12-09 PROCEDURE — 6360000002 HC RX W HCPCS: Performed by: EMERGENCY MEDICINE

## 2022-12-09 PROCEDURE — 80053 COMPREHEN METABOLIC PANEL: CPT

## 2022-12-09 PROCEDURE — 85025 COMPLETE CBC W/AUTO DIFF WBC: CPT

## 2022-12-09 RX ORDER — ONDANSETRON 2 MG/ML
4 INJECTION INTRAMUSCULAR; INTRAVENOUS ONCE
Status: COMPLETED | OUTPATIENT
Start: 2022-12-09 | End: 2022-12-09

## 2022-12-09 RX ORDER — 0.9 % SODIUM CHLORIDE 0.9 %
1000 INTRAVENOUS SOLUTION INTRAVENOUS ONCE
Status: COMPLETED | OUTPATIENT
Start: 2022-12-09 | End: 2022-12-09

## 2022-12-09 RX ORDER — SODIUM CHLORIDE 9 MG/ML
INJECTION, SOLUTION INTRAVENOUS CONTINUOUS
Status: DISCONTINUED | OUTPATIENT
Start: 2022-12-09 | End: 2022-12-09 | Stop reason: HOSPADM

## 2022-12-09 RX ORDER — ONDANSETRON 4 MG/1
4 TABLET, ORALLY DISINTEGRATING ORAL EVERY 8 HOURS PRN
Qty: 12 TABLET | Refills: 0 | Status: SHIPPED | OUTPATIENT
Start: 2022-12-09

## 2022-12-09 RX ADMIN — SODIUM CHLORIDE 1000 ML: 9 INJECTION, SOLUTION INTRAVENOUS at 17:58

## 2022-12-09 RX ADMIN — SODIUM CHLORIDE: 9 INJECTION, SOLUTION INTRAVENOUS at 17:01

## 2022-12-09 RX ADMIN — SODIUM CHLORIDE 1000 ML: 9 INJECTION, SOLUTION INTRAVENOUS at 18:58

## 2022-12-09 RX ADMIN — ONDANSETRON 4 MG: 2 INJECTION INTRAMUSCULAR; INTRAVENOUS at 17:02

## 2022-12-09 ASSESSMENT — ENCOUNTER SYMPTOMS
ANAL BLEEDING: 0
ABDOMINAL PAIN: 0
COUGH: 0
NAUSEA: 1
CONSTIPATION: 0
DIARRHEA: 1
BLOOD IN STOOL: 0
ABDOMINAL DISTENTION: 0
VOMITING: 1

## 2022-12-09 ASSESSMENT — PAIN DESCRIPTION - FREQUENCY: FREQUENCY: CONTINUOUS

## 2022-12-09 ASSESSMENT — PAIN DESCRIPTION - ONSET: ONSET: ON-GOING

## 2022-12-09 ASSESSMENT — PAIN - FUNCTIONAL ASSESSMENT
PAIN_FUNCTIONAL_ASSESSMENT: ACTIVITIES ARE NOT PREVENTED
PAIN_FUNCTIONAL_ASSESSMENT: 0-10

## 2022-12-09 ASSESSMENT — PAIN DESCRIPTION - PAIN TYPE: TYPE: ACUTE PAIN

## 2022-12-09 ASSESSMENT — PAIN DESCRIPTION - DESCRIPTORS: DESCRIPTORS: CRAMPING

## 2022-12-09 ASSESSMENT — PAIN DESCRIPTION - LOCATION: LOCATION: ABDOMEN

## 2022-12-09 ASSESSMENT — PAIN SCALES - GENERAL: PAINLEVEL_OUTOF10: 4

## 2022-12-09 NOTE — ED PROVIDER NOTES
[facility] ED  eMERGENCY dEPARTMENT eNCOUnter      Pt Name: Amador Wing  MRN: [id number]  [place] 2000  Date of evaluation: 12/9/2022  Provider: Christophe Bernard DO    CHIEF COMPLAINT     Chief Complaint   Patient presents with    Emesis     C/O emesis since 10 pm last evening. States he is throwing up about every 30 minutes-1 hour. Unable to keep any solids or fluids down. Diarrhea     Also c/o diarrhea since 10 pm       HISTORY OF PRESENT ILLNESS    Esperanza Christianson is a 25 y.o. male who presents to the emergency department from home nausea vomiting diarrhea. Symptoms started last night. No sick contacts. No recent travel recent antibiotics. Diarrhea. Unable to keep much down. Triage notes and Nursing notes were reviewed by myself. Any discrepancies are addressed above. PAST MEDICAL HISTORY     Past Medical History:   Diagnosis Date    Drug abuse ([facility] [address]     Gallstone     Pancreatitis        SURGICAL HISTORY       Past Surgical History:   Procedure Laterality Date    CHOLECYSTECTOMY      CHOLECYSTECTOMY, LAPAROSCOPIC N/A 10/1/2022    LAPAROSCOPIC CHOLECYSTECTOMY, performed by Ernestina Pop DO at [address]       Previous Medications    No medications on file       ALLERGIES     Augmentin [amoxicillin-pot clavulanate]    FAMILY HISTORY     No family history on file. SOCIAL HISTORY     Social History     Socioeconomic History    Marital status: Single   Tobacco Use    Smoking status: Every Day     Packs/day: 1.00     Years: 4.00     Pack years: 4.00     Types: Cigarettes     Start date: 2017    Smokeless tobacco: Never   Vaping Use    Vaping Use: Former   Substance and Sexual Activity    Alcohol use: Not Currently     Comment: last drank a week ago.     Drug use: Yes     Types: Marijuana Claudia Umana)     Comment: smoked yesterday       REVIEW OF SYSTEMS       Review of Systems   Constitutional:  Negative for chills, diaphoresis and fever. Respiratory:  Negative for cough. Cardiovascular:  Negative for chest pain. Gastrointestinal:  Positive for diarrhea, nausea and vomiting. Negative for abdominal distention, abdominal pain, anal bleeding, blood in stool and constipation. Genitourinary:  Negative for decreased urine volume and difficulty urinating. Musculoskeletal:  Negative for arthralgias, myalgias, neck pain and neck stiffness. Skin:  Negative for pallor, rash and wound. Neurological:  Negative for dizziness, light-headedness and headaches. All other systems reviewed and are negative. Except as noted above the remainder of the review of systems was reviewed and is negative. PHYSICAL EXAM    (up to 7 for level 4, 8 or more for level 5)     ED Triage Vitals   BP Temp Temp src Pulse Resp SpO2 Height Weight   -- -- -- -- -- -- -- --       Physical Exam  Vitals and nursing note reviewed. Constitutional:       General: He is not in acute distress. Appearance: Normal appearance. He is well-developed and normal weight. He is ill-appearing. He is not toxic-appearing or diaphoretic. HENT:      Head: Normocephalic and atraumatic. Mouth/Throat:      Mouth: Mucous membranes are dry. Eyes:      General:         Right eye: No discharge. Left eye: No discharge. Conjunctiva/sclera: Conjunctivae normal.      Pupils: Pupils are equal, round, and reactive to light. Neck:      Trachea: Trachea normal.   Cardiovascular:      Rate and Rhythm: Regular rhythm. Tachycardia present. Heart sounds: Normal heart sounds. Pulmonary:      Effort: Pulmonary effort is normal. No respiratory distress. Breath sounds: Normal breath sounds. No wheezing or rales. Chest:      Chest wall: No tenderness. Abdominal:      General: Bowel sounds are normal. There is no distension. Palpations: Abdomen is soft. There is no mass. Tenderness: There is no abdominal tenderness.  There is no right CVA tenderness, left CVA tenderness, guarding or rebound. Hernia: No hernia is present. Musculoskeletal:         General: No tenderness or deformity. Normal range of motion. Cervical back: Normal range of motion and neck supple. No rigidity. No spinous process tenderness or muscular tenderness. Skin:     General: Skin is warm and dry. Capillary Refill: Capillary refill takes less than 2 seconds. Coloration: Skin is not jaundiced or pale. Findings: No bruising, erythema, lesion or rash. Neurological:      Mental Status: He is alert and oriented to person, place, and time. GCS: GCS eye subscore is 4. GCS verbal subscore is 5. GCS motor subscore is 6. Cranial Nerves: No cranial nerve deficit. Sensory: No sensory deficit.       Coordination: Coordination normal.      Gait: Gait normal.   Psychiatric:         Behavior: Behavior normal.       DIAGNOSTIC RESULTS     EKG: (none if blank)  All EKG's areinterpreted by the Emergency Department Physician who either signs or Co-signs this chart in the absence of a cardiologist.        RADIOLOGY: (none if blank)   Interpretationper the Radiologist below, if available at the time of this note:    No orders to display       LABS:  Labs Reviewed   COMPREHENSIVE METABOLIC PANEL - Abnormal; Notable for the following components:       Result Value    Glucose 150 (*)     Bun/Cre Ratio 28 (*)     Anion Gap 18 (*)     All other components within normal limits   CBC WITH AUTO DIFFERENTIAL - Abnormal; Notable for the following components:    WBC 18.0 (*)     Seg Neutrophils 84 (*)     Monocytes 1 (*)     Segs Absolute 15.20 (*)     All other components within normal limits   LACTIC ACID - Abnormal; Notable for the following components:    Lactic Acid 3.1 (*)     All other components within normal limits   CULTURE, BLOOD 1   CULTURE, BLOOD 1   LACTIC ACID       All other labs were within normal range or not returned as of this dictation. EMERGENCY DEPARTMENT COURSE and Medical Decision Making:     MDM  Number of Diagnoses or Management Options  Dehydration  Lactic acidosis  Leukocytosis, unspecified type  Nausea vomiting and diarrhea  Diagnosis management comments: CRITICAL CARE TIME 40 minutes       Amount and/or Complexity of Data Reviewed  Clinical lab tests: ordered  Tests in the medicine section of CPT®: ordered  Review and summarize past medical records: yes  Independent visualization of images, tracings, or specimens: (Lab work reviewed by myself)    Risk of Complications, Morbidity, and/or Mortality  Presenting problems: moderate  Diagnostic procedures: moderate  Management options: moderate    Critical Care  Total time providing critical care: 30-74 minutes    Patient Progress  Patient progress: improved  /   Evaluated for vomiting diarrhea. Actively retching upon ED arrival.  Lab work reviewed. Started on IV fluids as he does have some clinical signs of dehydration. Does have a leukocytosis of 18,000. This is likely reactive from all of the retching and vomiting. His abdomen is soft without peritoneal signs. Lactate was also elevated at 3.1.  30 cc/kg were initiated. Cultures were pending. I do feel this is likely secondary to dehydration. Patient already feeling much improved after the first liter and Zofran. No further vomiting and he is tolerating PO. Repeat lactate is 1. Abdomen Remains soft without peritoneal signs. Will discharge and he is stable at this time. Given signs and symptoms of when to return to the emergency department.   Strict return precautions and follow up instructions were discussed with the patient with which the patient agrees    ED Medications administered this visit:    Medications   0.9 % sodium chloride infusion (0 mL/hr IntraVENous Stopped 12/9/22 0291)   0.9 % sodium chloride bolus (1,000 mLs IntraVENous New Bag 12/9/22 4922)   ondansetron (ZOFRAN) injection 4 mg (4 mg IntraVENous Given 12/9/22 1702)   0.9 % sodium chloride bolus (0 mLs IntraVENous Stopped 12/9/22 2001)       CONSULTS: (None if blank)  None    Procedures: (None if blank)       CLINICAL IMPRESSION      1. Nausea vomiting and diarrhea    2. Leukocytosis, unspecified type    3. Dehydration    4.  Lactic acidosis          DISPOSITION/PLAN    DISPOSITION Decision To Discharge 12/09/2022 08:32:09 PM      PATIENT REFERRED TO:  CHAPO Veras  [facility] [id number]  [phone]    Schedule an appointment as soon as possible for a visit in 5 days  If symptoms worsen return to ER    DISCHARGE MEDICATIONS:  New Prescriptions    ONDANSETRON (ZOFRAN-ODT) 4 MG DISINTEGRATING TABLET    Take 1 tablet by mouth every 8 hours as needed for Nausea or Vomiting              (Please note that portions of this note were completed with a voicerecognition program.  Efforts were made to edit the dictations but occasionally words are mis-transcribed.)      Supa Oneal DO (electronically signed)  Attending Emergency Department Provider        Supa Oneal DO  12/09/22 2035

## 2022-12-09 NOTE — Clinical Note
Nancy Billy was seen and treated in our emergency department on 12/9/2022. He may return to work on 12/12/2022. If you have any questions or concerns, please don't hesitate to call.       Malvin Peters, DO

## 2022-12-11 LAB
CULTURE: NORMAL
CULTURE: NORMAL
Lab: NORMAL
Lab: NORMAL
SPECIMEN DESCRIPTION: NORMAL
SPECIMEN DESCRIPTION: NORMAL

## 2023-12-04 ENCOUNTER — HOSPITAL ENCOUNTER (EMERGENCY)
Age: 23
Discharge: HOME | End: 2023-12-04
Attending: EMERGENCY MEDICINE
Payer: MEDICAID

## 2023-12-04 VITALS
HEIGHT: 74 IN | BODY MASS INDEX: 25.29 KG/M2 | SYSTOLIC BLOOD PRESSURE: 154 MMHG | OXYGEN SATURATION: 97 % | RESPIRATION RATE: 18 BRPM | WEIGHT: 197.1 LBS | DIASTOLIC BLOOD PRESSURE: 87 MMHG | TEMPERATURE: 97.1 F | HEART RATE: 100 BPM

## 2023-12-04 DIAGNOSIS — R11.2 NAUSEA AND VOMITING, UNSPECIFIED VOMITING TYPE: Primary | ICD-10-CM

## 2023-12-04 DIAGNOSIS — E86.0 DEHYDRATION: ICD-10-CM

## 2023-12-04 LAB
ALBUMIN SERPL-MCNC: 4.6 G/DL (ref 3.5–5.2)
ALP SERPL-CCNC: 94 U/L (ref 40–129)
ALT SERPL-CCNC: 14 U/L (ref 5–41)
ANION GAP SERPL CALCULATED.3IONS-SCNC: 18 MMOL/L (ref 9–17)
AST SERPL-CCNC: 15 U/L
BASOPHILS # BLD: 0.02 K/UL (ref 0–0.2)
BASOPHILS NFR BLD: 0 % (ref 0–2)
BILIRUB SERPL-MCNC: 0.9 MG/DL (ref 0.3–1.2)
BUN SERPL-MCNC: 18 MG/DL (ref 6–20)
BUN/CREAT SERPL: 23 (ref 9–20)
CALCIUM SERPL-MCNC: 9 MG/DL (ref 8.6–10.4)
CHLORIDE SERPL-SCNC: 98 MMOL/L (ref 98–107)
CO2 SERPL-SCNC: 21 MMOL/L (ref 20–31)
CREAT SERPL-MCNC: 0.8 MG/DL (ref 0.7–1.2)
EOSINOPHIL # BLD: 0.01 K/UL (ref 0–0.4)
EOSINOPHILS RELATIVE PERCENT: 0 % (ref 0–5)
ERYTHROCYTE [DISTWIDTH] IN BLOOD BY AUTOMATED COUNT: 12.1 % (ref 12.1–15.2)
GFR SERPL CREATININE-BSD FRML MDRD: >60 ML/MIN/1.73M2
GLUCOSE SERPL-MCNC: 145 MG/DL (ref 70–99)
HCT VFR BLD AUTO: 45.9 % (ref 41–53)
HGB BLD-MCNC: 16.4 G/DL (ref 13.5–17.5)
IMM GRANULOCYTES # BLD AUTO: 0.02 K/UL (ref 0–0.3)
IMM GRANULOCYTES NFR BLD: 0 % (ref 0–5)
LYMPHOCYTES NFR BLD: 1.05 K/UL (ref 1.2–5.2)
LYMPHOCYTES RELATIVE PERCENT: 13 % (ref 13–44)
MCH RBC QN AUTO: 29.3 PG (ref 26–34)
MCHC RBC AUTO-ENTMCNC: 35.7 G/DL (ref 31–37)
MCV RBC AUTO: 82 FL (ref 80–100)
MONOCYTES NFR BLD: 0.41 K/UL (ref 0–1)
MONOCYTES NFR BLD: 5 % (ref 5–9)
NEUTROPHILS NFR BLD: 82 % (ref 39–75)
NEUTS SEG NFR BLD: 6.58 K/UL (ref 2.1–6.5)
PLATELET # BLD AUTO: 186 K/UL (ref 140–450)
PMV BLD AUTO: 10.2 FL (ref 6–12)
POTASSIUM SERPL-SCNC: 3.8 MMOL/L (ref 3.7–5.3)
PROT SERPL-MCNC: 7.3 G/DL (ref 6.4–8.3)
RBC # BLD AUTO: 5.6 M/UL (ref 4.5–5.9)
SODIUM SERPL-SCNC: 137 MMOL/L (ref 135–144)
WBC OTHER # BLD: 8.1 K/UL (ref 4.5–13.5)

## 2023-12-04 PROCEDURE — 96375 TX/PRO/DX INJ NEW DRUG ADDON: CPT

## 2023-12-04 PROCEDURE — 80053 COMPREHEN METABOLIC PANEL: CPT

## 2023-12-04 PROCEDURE — 96361 HYDRATE IV INFUSION ADD-ON: CPT

## 2023-12-04 PROCEDURE — 6360000002 HC RX W HCPCS: Performed by: EMERGENCY MEDICINE

## 2023-12-04 PROCEDURE — 2580000003 HC RX 258: Performed by: EMERGENCY MEDICINE

## 2023-12-04 PROCEDURE — 99284 EMERGENCY DEPT VISIT MOD MDM: CPT

## 2023-12-04 PROCEDURE — 96374 THER/PROPH/DIAG INJ IV PUSH: CPT

## 2023-12-04 PROCEDURE — 85025 COMPLETE CBC W/AUTO DIFF WBC: CPT

## 2023-12-04 RX ORDER — PROCHLORPERAZINE MALEATE 10 MG
10 TABLET ORAL EVERY 8 HOURS PRN
Qty: 10 TABLET | Refills: 0 | Status: SHIPPED | OUTPATIENT
Start: 2023-12-04

## 2023-12-04 RX ORDER — ONDANSETRON 2 MG/ML
4 INJECTION INTRAMUSCULAR; INTRAVENOUS ONCE
Status: COMPLETED | OUTPATIENT
Start: 2023-12-04 | End: 2023-12-04

## 2023-12-04 RX ORDER — 0.9 % SODIUM CHLORIDE 0.9 %
1000 INTRAVENOUS SOLUTION INTRAVENOUS ONCE
Status: COMPLETED | OUTPATIENT
Start: 2023-12-04 | End: 2023-12-04

## 2023-12-04 RX ORDER — PROCHLORPERAZINE EDISYLATE 5 MG/ML
10 INJECTION INTRAMUSCULAR; INTRAVENOUS ONCE
Status: COMPLETED | OUTPATIENT
Start: 2023-12-04 | End: 2023-12-04

## 2023-12-04 RX ADMIN — ONDANSETRON 4 MG: 2 INJECTION INTRAMUSCULAR; INTRAVENOUS at 11:19

## 2023-12-04 RX ADMIN — PROCHLORPERAZINE EDISYLATE 10 MG: 5 INJECTION INTRAMUSCULAR; INTRAVENOUS at 11:55

## 2023-12-04 RX ADMIN — SODIUM CHLORIDE 1000 ML: 9 INJECTION, SOLUTION INTRAVENOUS at 11:21

## 2023-12-04 ASSESSMENT — PAIN - FUNCTIONAL ASSESSMENT: PAIN_FUNCTIONAL_ASSESSMENT: NONE - DENIES PAIN

## 2023-12-04 ASSESSMENT — LIFESTYLE VARIABLES
HOW MANY STANDARD DRINKS CONTAINING ALCOHOL DO YOU HAVE ON A TYPICAL DAY: PATIENT DOES NOT DRINK
HOW OFTEN DO YOU HAVE A DRINK CONTAINING ALCOHOL: NEVER

## 2023-12-04 NOTE — ED PROVIDER NOTES
eMERGENCY dEPARTMENT eNCOUnter      [address]    Chief Complaint   Patient presents with    Emesis     Emesis, diarrhea  x1 day \"I can't keep anything down\"       HPI    Jimmy Adhikari is a 23 y.o. male who presents to ED from home with vomiting. With vomiting and diarrhea  that started last night. Patient had multiple episodes of vomiting. Symptoms started last night. Patient also has been having diarrhea. Patient has abdominal cramps. REVIEW OF SYSTEMS    All systems reviewed and positives are in the HPI    PAST MEDICAL HISTORY    Past Medical History:   Diagnosis Date    Drug abuse ([address])     Gallstone     Pancreatitis        SURGICAL HISTORY    Past Surgical History:   Procedure Laterality Date    CHOLECYSTECTOMY      CHOLECYSTECTOMY, LAPAROSCOPIC N/A 10/1/2022    LAPAROSCOPIC CHOLECYSTECTOMY, performed by Ruma Urena DO at [address]    Current Outpatient Rx   Medication Sig Dispense Refill    prochlorperazine (COMPAZINE) 10 MG tablet Take 1 tablet by mouth every 8 hours as needed (for nausea, vomitig) 10 tablet 0         ALLERGIES    Allergies   Allergen Reactions    Augmentin [Amoxicillin-Pot Clavulanate]        FAMILY HISTORY    History reviewed. No pertinent family history. SOCIAL HISTORY    Social History     Socioeconomic History    Marital status: Single     Spouse name: None    Number of children: None    Years of education: None    Highest education level: None   Tobacco Use    Smoking status: Every Day     Packs/day: 1.00     Years: 4.00     Additional pack years: 0.00     Total pack years: 4.00     Types: Cigarettes     Start date: 2017    Smokeless tobacco: Never   Vaping Use    Vaping Use: Former   Substance and Sexual Activity    Alcohol use: Not Currently     Comment: last drank a week ago.     Drug use: Yes     Types: Marijuana (Weed)       PHYSICAL EXAM    VITAL SIGNS: BP (!) 154/87   Pulse 100   Temp

## 2024-01-11 ENCOUNTER — HOSPITAL ENCOUNTER (EMERGENCY)
Age: 24
Discharge: HOME | End: 2024-01-11
Attending: EMERGENCY MEDICINE
Payer: MEDICAID

## 2024-01-11 VITALS
TEMPERATURE: 98.7 F | DIASTOLIC BLOOD PRESSURE: 59 MMHG | RESPIRATION RATE: 16 BRPM | WEIGHT: 193 LBS | HEART RATE: 90 BPM | OXYGEN SATURATION: 95 % | BODY MASS INDEX: 24.77 KG/M2 | HEIGHT: 74 IN | SYSTOLIC BLOOD PRESSURE: 98 MMHG

## 2024-01-11 DIAGNOSIS — R11.2 CANNABINOID HYPEREMESIS SYNDROME: Primary | ICD-10-CM

## 2024-01-11 DIAGNOSIS — F12.90 CANNABINOID HYPEREMESIS SYNDROME: Primary | ICD-10-CM

## 2024-01-11 LAB
ALBUMIN SERPL-MCNC: 4.7 G/DL (ref 3.5–5.2)
ALP SERPL-CCNC: 89 U/L (ref 40–129)
ALT SERPL-CCNC: 18 U/L (ref 5–41)
ANION GAP SERPL CALCULATED.3IONS-SCNC: 17 MMOL/L (ref 9–17)
AST SERPL-CCNC: 15 U/L
BASOPHILS # BLD: ABNORMAL K/UL (ref 0–0.2)
BASOPHILS NFR BLD: ABNORMAL % (ref 0–2)
BILIRUB SERPL-MCNC: 1.4 MG/DL (ref 0.3–1.2)
BUN SERPL-MCNC: 18 MG/DL (ref 6–20)
BUN/CREAT SERPL: 30 (ref 9–20)
CALCIUM SERPL-MCNC: 9.4 MG/DL (ref 8.6–10.4)
CHLORIDE SERPL-SCNC: 100 MMOL/L (ref 98–107)
CO2 SERPL-SCNC: 21 MMOL/L (ref 20–31)
CREAT SERPL-MCNC: 0.6 MG/DL (ref 0.7–1.2)
EOSINOPHIL # BLD: ABNORMAL K/UL (ref 0–0.4)
EOSINOPHILS RELATIVE PERCENT: ABNORMAL % (ref 0–5)
ERYTHROCYTE [DISTWIDTH] IN BLOOD BY AUTOMATED COUNT: 12.3 % (ref 12.1–15.2)
GFR SERPL CREATININE-BSD FRML MDRD: >60 ML/MIN/1.73M2
GLUCOSE SERPL-MCNC: 173 MG/DL (ref 70–99)
HCT VFR BLD AUTO: 45.3 % (ref 41–53)
HGB BLD-MCNC: 16 G/DL (ref 13.5–17.5)
IMM GRANULOCYTES # BLD AUTO: ABNORMAL K/UL (ref 0–0.3)
IMM GRANULOCYTES NFR BLD: ABNORMAL %
LIPASE SERPL-CCNC: 9 U/L (ref 13–60)
LYMPHOCYTES NFR BLD: 0.91 K/UL (ref 1–4.8)
LYMPHOCYTES RELATIVE PERCENT: 5 % (ref 13–44)
MCH RBC QN AUTO: 29 PG (ref 26–34)
MCHC RBC AUTO-ENTMCNC: 35.3 G/DL (ref 31–37)
MCV RBC AUTO: 82.2 FL (ref 80–100)
MONOCYTES NFR BLD: 0.36 K/UL (ref 0–1)
MONOCYTES NFR BLD: 2 % (ref 5–9)
MORPHOLOGY: ABNORMAL
NEUTROPHILS NFR BLD: 93 % (ref 39–75)
NEUTS SEG NFR BLD: 16.93 K/UL (ref 2.1–6.5)
PLATELET # BLD AUTO: 220 K/UL (ref 140–450)
PMV BLD AUTO: 10.2 FL (ref 6–12)
POTASSIUM SERPL-SCNC: 3.4 MMOL/L (ref 3.7–5.3)
PROT SERPL-MCNC: 7.8 G/DL (ref 6.4–8.3)
RBC # BLD AUTO: 5.51 M/UL (ref 4.5–5.9)
SODIUM SERPL-SCNC: 138 MMOL/L (ref 135–144)
WBC OTHER # BLD: 18.2 K/UL (ref 4.5–13.5)

## 2024-01-11 PROCEDURE — 96374 THER/PROPH/DIAG INJ IV PUSH: CPT

## 2024-01-11 PROCEDURE — 80053 COMPREHEN METABOLIC PANEL: CPT

## 2024-01-11 PROCEDURE — 99284 EMERGENCY DEPT VISIT MOD MDM: CPT

## 2024-01-11 PROCEDURE — 85025 COMPLETE CBC W/AUTO DIFF WBC: CPT

## 2024-01-11 PROCEDURE — 83690 ASSAY OF LIPASE: CPT

## 2024-01-11 PROCEDURE — 2580000003 HC RX 258: Performed by: EMERGENCY MEDICINE

## 2024-01-11 PROCEDURE — 6360000002 HC RX W HCPCS: Performed by: EMERGENCY MEDICINE

## 2024-01-11 RX ORDER — DROPERIDOL 2.5 MG/ML
2.5 INJECTION, SOLUTION INTRAMUSCULAR; INTRAVENOUS EVERY 6 HOURS PRN
Status: DISCONTINUED | OUTPATIENT
Start: 2024-01-11 | End: 2024-01-11 | Stop reason: HOSPADM

## 2024-01-11 RX ORDER — PROMETHAZINE HYDROCHLORIDE 25 MG/1
25 TABLET ORAL EVERY 6 HOURS PRN
COMMUNITY

## 2024-01-11 RX ORDER — 0.9 % SODIUM CHLORIDE 0.9 %
1000 INTRAVENOUS SOLUTION INTRAVENOUS ONCE
Status: COMPLETED | OUTPATIENT
Start: 2024-01-11 | End: 2024-01-11

## 2024-01-11 RX ORDER — ONDANSETRON 4 MG/1
4 TABLET, ORALLY DISINTEGRATING ORAL 3 TIMES DAILY PRN
Qty: 21 TABLET | Refills: 0 | Status: SHIPPED | OUTPATIENT
Start: 2024-01-11

## 2024-01-11 RX ADMIN — SODIUM CHLORIDE 1000 ML: 9 INJECTION, SOLUTION INTRAVENOUS at 09:34

## 2024-01-11 RX ADMIN — DROPERIDOL 2.5 MG: 2.5 INJECTION, SOLUTION INTRAMUSCULAR; INTRAVENOUS at 09:35

## 2024-01-11 ASSESSMENT — ENCOUNTER SYMPTOMS
BACK PAIN: 0
ABDOMINAL PAIN: 0
EYE REDNESS: 0
SORE THROAT: 0
VOMITING: 1
COUGH: 0
DIARRHEA: 1
SHORTNESS OF BREATH: 0
NAUSEA: 1
EYE PAIN: 0
TROUBLE SWALLOWING: 0
COLOR CHANGE: 0

## 2024-01-11 ASSESSMENT — PAIN - FUNCTIONAL ASSESSMENT: PAIN_FUNCTIONAL_ASSESSMENT: NONE - DENIES PAIN

## 2024-01-11 ASSESSMENT — LIFESTYLE VARIABLES
HOW OFTEN DO YOU HAVE A DRINK CONTAINING ALCOHOL: NEVER
HOW MANY STANDARD DRINKS CONTAINING ALCOHOL DO YOU HAVE ON A TYPICAL DAY: PATIENT DOES NOT DRINK

## 2024-01-11 NOTE — ED PROVIDER NOTES
[facility] ED  eMERGENCY dEPARTMENT eNCOUnter      Pt Name: Esperanza Ochoa  MRN: [id number]  Birthdate 2000  Date of evaluation: 1/11/2024  Provider: Josue Lizama MD    CHIEF COMPLAINT       Chief Complaint   Patient presents with    Emesis     Pt reports he went home from work last night and hasn't been able to keep water down since then. Pt reports this happens a couple of times a month. Pt states \"my kidneys hurt.\"     Patient is a 23-year-old male who presents to the emergency department complaining of nausea vomiting and diarrhea.  Patient states that this has been a relatively continuous problem where he gets 2-3 episodes a month where this happens.  He states he cannot keep anything down and despite trying some Phenergan and Zofran at home he was still vomiting.  He states he does use marijuana daily.  He denies any fever or chills.        Nursing Notes were reviewed.    REVIEW OF SYSTEMS    (2-9 systems for level 4, 10 or more for level 5)     Review of Systems   Constitutional:  Negative for chills and fever.   HENT:  Negative for ear pain, sore throat and trouble swallowing.    Eyes:  Negative for pain and redness.   Respiratory:  Negative for cough and shortness of breath.    Cardiovascular:  Negative for chest pain and palpitations.   Gastrointestinal:  Positive for diarrhea, nausea and vomiting. Negative for abdominal pain.   Genitourinary:  Negative for dysuria and frequency.   Musculoskeletal:  Negative for back pain and neck pain.   Skin:  Negative for color change and rash.   Neurological:  Negative for dizziness, syncope and headaches.   Psychiatric/Behavioral:  Negative for hallucinations and suicidal ideas.        Except as noted above the remainder of the review of systems was reviewed and negative.       PAST MEDICAL HISTORY     Past Medical History:   Diagnosis Date    Drug abuse ([facility])     Gallstone     Pancreatitis          SURGICAL HISTORY       Past Surgical History:

## 2024-01-11 NOTE — ED TRIAGE NOTES
Home med list reviewed with patients verbal recall. Pt arrives via private vehicle with significant other. Pt is alert and oriented x4 upon arrival.

## 2025-05-16 ENCOUNTER — TELEPHONE (OUTPATIENT)
Dept: FAMILY MEDICINE CLINIC | Age: 25
End: 2025-05-16

## 2025-05-16 NOTE — TELEPHONE ENCOUNTER
----- Message from Surendra Post, SALLY sent at 5/16/2025  2:52 PM EDT -----  Call this pt and see if we can see him earlier than 5/22/25. Hx of A-fib and chest pain makes me a little concerned.

## 2025-05-19 ENCOUNTER — OFFICE VISIT (OUTPATIENT)
Dept: FAMILY MEDICINE CLINIC | Age: 25
End: 2025-05-19
Payer: COMMERCIAL

## 2025-05-19 ENCOUNTER — APPOINTMENT (OUTPATIENT)
Dept: GENERAL RADIOLOGY | Age: 25
End: 2025-05-19
Payer: COMMERCIAL

## 2025-05-19 ENCOUNTER — HOSPITAL ENCOUNTER (EMERGENCY)
Age: 25
Discharge: ANOTHER HOSPITAL | End: 2025-05-19
Attending: FAMILY MEDICINE
Payer: COMMERCIAL

## 2025-05-19 ENCOUNTER — HOSPITAL ENCOUNTER (OUTPATIENT)
Age: 25
Discharge: HOME | End: 2025-05-19
Payer: COMMERCIAL

## 2025-05-19 ENCOUNTER — HOSPITAL ENCOUNTER (OUTPATIENT)
Dept: NON INVASIVE DIAGNOSTICS | Age: 25
Discharge: HOME | End: 2025-05-19
Payer: COMMERCIAL

## 2025-05-19 ENCOUNTER — HOSPITAL ENCOUNTER (INPATIENT)
Age: 25
LOS: 2 days | Discharge: HOME | DRG: 312 | End: 2025-05-21
Attending: INTERNAL MEDICINE | Admitting: STUDENT IN AN ORGANIZED HEALTH CARE EDUCATION/TRAINING PROGRAM
Payer: COMMERCIAL

## 2025-05-19 VITALS
TEMPERATURE: 97.9 F | DIASTOLIC BLOOD PRESSURE: 90 MMHG | OXYGEN SATURATION: 99 % | WEIGHT: 228.9 LBS | SYSTOLIC BLOOD PRESSURE: 158 MMHG | BODY MASS INDEX: 29.38 KG/M2 | HEIGHT: 74 IN | HEART RATE: 105 BPM | RESPIRATION RATE: 19 BRPM

## 2025-05-19 VITALS
DIASTOLIC BLOOD PRESSURE: 70 MMHG | OXYGEN SATURATION: 97 % | WEIGHT: 224 LBS | BODY MASS INDEX: 28.75 KG/M2 | HEIGHT: 74 IN | HEART RATE: 87 BPM | SYSTOLIC BLOOD PRESSURE: 126 MMHG

## 2025-05-19 DIAGNOSIS — Z86.79 HISTORY OF ATRIAL FIBRILLATION: ICD-10-CM

## 2025-05-19 DIAGNOSIS — R07.9 CHEST PAIN, UNSPECIFIED TYPE: ICD-10-CM

## 2025-05-19 DIAGNOSIS — R00.2 PALPITATIONS: ICD-10-CM

## 2025-05-19 DIAGNOSIS — R55 SYNCOPE, UNSPECIFIED SYNCOPE TYPE: Primary | ICD-10-CM

## 2025-05-19 DIAGNOSIS — Z87.19 HISTORY OF ACUTE PANCREATITIS: ICD-10-CM

## 2025-05-19 DIAGNOSIS — R55 VASOVAGAL SYNCOPE: ICD-10-CM

## 2025-05-19 DIAGNOSIS — R73.09 ELEVATED GLUCOSE: ICD-10-CM

## 2025-05-19 DIAGNOSIS — F15.91 HISTORY OF METHAMPHETAMINE USE: ICD-10-CM

## 2025-05-19 DIAGNOSIS — R00.1 BRADYCARDIA: ICD-10-CM

## 2025-05-19 DIAGNOSIS — F12.90 MARIJUANA SMOKER, CONTINUOUS: ICD-10-CM

## 2025-05-19 DIAGNOSIS — I49.9 CARDIAC ARRHYTHMIA, UNSPECIFIED CARDIAC ARRHYTHMIA TYPE: Primary | ICD-10-CM

## 2025-05-19 DIAGNOSIS — R07.9 CHEST PAIN, UNSPECIFIED TYPE: Primary | ICD-10-CM

## 2025-05-19 LAB
ALBUMIN SERPL-MCNC: 4.5 G/DL (ref 3.5–5.2)
ALBUMIN/GLOB SERPL: 2 {RATIO} (ref 1–2.5)
ALP SERPL-CCNC: 66 U/L (ref 40–129)
ALT SERPL-CCNC: 25 U/L (ref 5–41)
AMPHET UR QL SCN: NEGATIVE
ANION GAP SERPL CALCULATED.3IONS-SCNC: 10 MMOL/L (ref 9–17)
ANION GAP SERPL CALCULATED.3IONS-SCNC: 13 MMOL/L (ref 9–17)
AST SERPL-CCNC: 18 U/L
BARBITURATES UR QL SCN: NEGATIVE
BASOPHILS # BLD: 0.02 K/UL (ref 0–0.2)
BASOPHILS # BLD: 0.03 K/UL (ref 0–0.2)
BASOPHILS NFR BLD: 0 % (ref 0–2)
BASOPHILS NFR BLD: 0 % (ref 0–2)
BENZODIAZ UR QL: NEGATIVE
BILIRUB SERPL-MCNC: 0.5 MG/DL (ref 0.3–1.2)
BUN SERPL-MCNC: 16 MG/DL (ref 6–20)
BUN SERPL-MCNC: 16 MG/DL (ref 6–20)
CALCIUM SERPL-MCNC: 9.1 MG/DL (ref 8.6–10.4)
CALCIUM SERPL-MCNC: 9.1 MG/DL (ref 8.6–10.4)
CANNABINOIDS UR QL SCN: POSITIVE
CHLORIDE SERPL-SCNC: 102 MMOL/L (ref 98–107)
CHLORIDE SERPL-SCNC: 104 MMOL/L (ref 98–107)
CHP ED QC CHECK: NORMAL
CO2 SERPL-SCNC: 26 MMOL/L (ref 20–31)
CO2 SERPL-SCNC: 28 MMOL/L (ref 20–31)
COCAINE UR QL SCN: NEGATIVE
CREAT SERPL-MCNC: 0.9 MG/DL (ref 0.7–1.2)
CREAT SERPL-MCNC: 0.9 MG/DL (ref 0.7–1.2)
D DIMER PPP FEU-MCNC: 0.27 UG/ML FEU (ref 0–0.59)
EOSINOPHIL # BLD: 0.1 K/UL (ref 0–0.4)
EOSINOPHIL # BLD: 0.11 K/UL (ref 0–0.4)
EOSINOPHILS RELATIVE PERCENT: 1 % (ref 0–5)
EOSINOPHILS RELATIVE PERCENT: 1 % (ref 0–5)
ERYTHROCYTE [DISTWIDTH] IN BLOOD BY AUTOMATED COUNT: 11.8 % (ref 12.1–15.2)
ERYTHROCYTE [DISTWIDTH] IN BLOOD BY AUTOMATED COUNT: 11.9 % (ref 12.1–15.2)
ETHANOL PERCENT: 0 %
ETHANOLAMINE SERPL-MCNC: <10 MG/DL (ref 0–0.08)
FENTANYL UR QL: NEGATIVE
GFR, ESTIMATED: >90 ML/MIN/1.73M2
GFR, ESTIMATED: >90 ML/MIN/1.73M2
GLUCOSE BLD-MCNC: 117 MG/DL
GLUCOSE BLD-MCNC: 117 MG/DL (ref 65–99)
GLUCOSE SERPL-MCNC: 101 MG/DL (ref 70–99)
GLUCOSE SERPL-MCNC: 109 MG/DL (ref 70–99)
HCT VFR BLD AUTO: 44.9 % (ref 41–53)
HCT VFR BLD AUTO: 45.5 % (ref 41–53)
HGB BLD-MCNC: 15.6 G/DL (ref 13.5–17.5)
HGB BLD-MCNC: 15.9 G/DL (ref 13.5–17.5)
IMM GRANULOCYTES # BLD AUTO: 0.01 K/UL (ref 0–0.3)
IMM GRANULOCYTES # BLD AUTO: 0.03 K/UL (ref 0–0.3)
IMM GRANULOCYTES NFR BLD: 0 % (ref 0–5)
IMM GRANULOCYTES NFR BLD: 0 % (ref 0–5)
LIPASE SERPL-CCNC: 18 U/L (ref 13–60)
LYMPHOCYTES NFR BLD: 2.24 K/UL (ref 1–4.8)
LYMPHOCYTES NFR BLD: 3.56 K/UL (ref 1–4.8)
LYMPHOCYTES RELATIVE PERCENT: 28 % (ref 13–44)
LYMPHOCYTES RELATIVE PERCENT: 34 % (ref 13–44)
MCH RBC QN AUTO: 29.7 PG (ref 26–34)
MCH RBC QN AUTO: 29.9 PG (ref 26–34)
MCHC RBC AUTO-ENTMCNC: 34.7 G/DL (ref 31–37)
MCHC RBC AUTO-ENTMCNC: 34.9 G/DL (ref 31–37)
MCV RBC AUTO: 85.5 FL (ref 80–100)
MCV RBC AUTO: 85.5 FL (ref 80–100)
METHADONE UR QL: NEGATIVE
MONOCYTES NFR BLD: 0.55 K/UL (ref 0–1)
MONOCYTES NFR BLD: 0.59 K/UL (ref 0–1)
MONOCYTES NFR BLD: 6 % (ref 5–9)
MONOCYTES NFR BLD: 7 % (ref 5–9)
NEUTROPHILS NFR BLD: 59 % (ref 39–75)
NEUTROPHILS NFR BLD: 64 % (ref 39–75)
NEUTS SEG NFR BLD: 5.02 K/UL (ref 2.1–6.5)
NEUTS SEG NFR BLD: 6.21 K/UL (ref 2.1–6.5)
OPIATES UR QL SCN: NEGATIVE
OXYCODONE UR QL SCN: NEGATIVE
PCP UR QL SCN: NEGATIVE
PLATELET # BLD AUTO: 173 K/UL (ref 140–450)
PLATELET # BLD AUTO: 182 K/UL (ref 140–450)
PMV BLD AUTO: 10.1 FL (ref 6–12)
PMV BLD AUTO: 10.2 FL (ref 6–12)
POTASSIUM SERPL-SCNC: 3.5 MMOL/L (ref 3.7–5.3)
POTASSIUM SERPL-SCNC: 4.3 MMOL/L (ref 3.7–5.3)
PROT SERPL-MCNC: 6.7 G/DL (ref 6.4–8.3)
RBC # BLD AUTO: 5.25 M/UL (ref 4.5–5.9)
RBC # BLD AUTO: 5.32 M/UL (ref 4.5–5.9)
SODIUM SERPL-SCNC: 141 MMOL/L (ref 135–144)
SODIUM SERPL-SCNC: 142 MMOL/L (ref 135–144)
TEST INFORMATION: ABNORMAL
TROPONIN I SERPL HS-MCNC: <6 NG/L (ref 0–22)
TROPONIN I SERPL HS-MCNC: <6 NG/L (ref 0–22)
WBC OTHER # BLD: 10.5 K/UL (ref 3.5–11)
WBC OTHER # BLD: 7.9 K/UL (ref 3.5–11)

## 2025-05-19 PROCEDURE — 84484 ASSAY OF TROPONIN QUANT: CPT

## 2025-05-19 PROCEDURE — G0480 DRUG TEST DEF 1-7 CLASSES: HCPCS

## 2025-05-19 PROCEDURE — 93005 ELECTROCARDIOGRAM TRACING: CPT | Performed by: STUDENT IN AN ORGANIZED HEALTH CARE EDUCATION/TRAINING PROGRAM

## 2025-05-19 PROCEDURE — 80053 COMPREHEN METABOLIC PANEL: CPT

## 2025-05-19 PROCEDURE — 85025 COMPLETE CBC W/AUTO DIFF WBC: CPT

## 2025-05-19 PROCEDURE — 80307 DRUG TEST PRSMV CHEM ANLYZR: CPT

## 2025-05-19 PROCEDURE — 36415 COLL VENOUS BLD VENIPUNCTURE: CPT

## 2025-05-19 PROCEDURE — 83690 ASSAY OF LIPASE: CPT

## 2025-05-19 PROCEDURE — 99285 EMERGENCY DEPT VISIT HI MDM: CPT

## 2025-05-19 PROCEDURE — 99203 OFFICE O/P NEW LOW 30 MIN: CPT | Performed by: NURSE PRACTITIONER

## 2025-05-19 PROCEDURE — 80048 BASIC METABOLIC PNL TOTAL CA: CPT

## 2025-05-19 PROCEDURE — 2000000000 HC ICU R&B

## 2025-05-19 PROCEDURE — 85379 FIBRIN DEGRADATION QUANT: CPT

## 2025-05-19 PROCEDURE — 83036 HEMOGLOBIN GLYCOSYLATED A1C: CPT

## 2025-05-19 PROCEDURE — 2580000003 HC RX 258: Performed by: FAMILY MEDICINE

## 2025-05-19 PROCEDURE — 93005 ELECTROCARDIOGRAM TRACING: CPT

## 2025-05-19 PROCEDURE — 71045 X-RAY EXAM CHEST 1 VIEW: CPT

## 2025-05-19 PROCEDURE — 82947 ASSAY GLUCOSE BLOOD QUANT: CPT

## 2025-05-19 PROCEDURE — 93005 ELECTROCARDIOGRAM TRACING: CPT | Performed by: FAMILY MEDICINE

## 2025-05-19 RX ORDER — SODIUM CHLORIDE 9 MG/ML
INJECTION, SOLUTION INTRAVENOUS PRN
Status: DISCONTINUED | OUTPATIENT
Start: 2025-05-19 | End: 2025-05-21 | Stop reason: HOSPADM

## 2025-05-19 RX ORDER — POTASSIUM CHLORIDE 1500 MG/1
40 TABLET, EXTENDED RELEASE ORAL PRN
Status: DISCONTINUED | OUTPATIENT
Start: 2025-05-19 | End: 2025-05-21 | Stop reason: HOSPADM

## 2025-05-19 RX ORDER — MAGNESIUM SULFATE 1 G/100ML
1000 INJECTION INTRAVENOUS PRN
Status: DISCONTINUED | OUTPATIENT
Start: 2025-05-19 | End: 2025-05-21 | Stop reason: HOSPADM

## 2025-05-19 RX ORDER — ACETAMINOPHEN 325 MG/1
650 TABLET ORAL EVERY 6 HOURS PRN
Status: DISCONTINUED | OUTPATIENT
Start: 2025-05-19 | End: 2025-05-21 | Stop reason: HOSPADM

## 2025-05-19 RX ORDER — SODIUM CHLORIDE 0.9 % (FLUSH) 0.9 %
10 SYRINGE (ML) INJECTION PRN
Status: DISCONTINUED | OUTPATIENT
Start: 2025-05-19 | End: 2025-05-21 | Stop reason: HOSPADM

## 2025-05-19 RX ORDER — SODIUM CHLORIDE 9 MG/ML
INJECTION, SOLUTION INTRAVENOUS CONTINUOUS
Status: DISCONTINUED | OUTPATIENT
Start: 2025-05-19 | End: 2025-05-19 | Stop reason: HOSPADM

## 2025-05-19 RX ORDER — METOPROLOL TARTRATE 25 MG/1
25 TABLET, FILM COATED ORAL 2 TIMES DAILY
Status: DISCONTINUED | OUTPATIENT
Start: 2025-05-19 | End: 2025-05-20

## 2025-05-19 RX ORDER — SODIUM CHLORIDE 0.9 % (FLUSH) 0.9 %
5-40 SYRINGE (ML) INJECTION EVERY 12 HOURS SCHEDULED
Status: DISCONTINUED | OUTPATIENT
Start: 2025-05-19 | End: 2025-05-21 | Stop reason: HOSPADM

## 2025-05-19 RX ORDER — ONDANSETRON 2 MG/ML
4 INJECTION INTRAMUSCULAR; INTRAVENOUS ONCE
Status: DISCONTINUED | OUTPATIENT
Start: 2025-05-19 | End: 2025-05-19 | Stop reason: HOSPADM

## 2025-05-19 RX ORDER — ASPIRIN 81 MG/1
81 TABLET, CHEWABLE ORAL DAILY
Status: DISCONTINUED | OUTPATIENT
Start: 2025-05-20 | End: 2025-05-21 | Stop reason: HOSPADM

## 2025-05-19 RX ORDER — POTASSIUM CHLORIDE 7.45 MG/ML
10 INJECTION INTRAVENOUS PRN
Status: DISCONTINUED | OUTPATIENT
Start: 2025-05-19 | End: 2025-05-21 | Stop reason: HOSPADM

## 2025-05-19 RX ORDER — NITROGLYCERIN 0.4 MG/1
0.4 TABLET SUBLINGUAL EVERY 5 MIN PRN
Status: DISCONTINUED | OUTPATIENT
Start: 2025-05-19 | End: 2025-05-21 | Stop reason: HOSPADM

## 2025-05-19 RX ORDER — ATORVASTATIN CALCIUM 80 MG/1
80 TABLET, FILM COATED ORAL NIGHTLY
Status: DISCONTINUED | OUTPATIENT
Start: 2025-05-19 | End: 2025-05-21 | Stop reason: HOSPADM

## 2025-05-19 RX ORDER — ACETAMINOPHEN 650 MG/1
650 SUPPOSITORY RECTAL EVERY 6 HOURS PRN
Status: DISCONTINUED | OUTPATIENT
Start: 2025-05-19 | End: 2025-05-21 | Stop reason: HOSPADM

## 2025-05-19 RX ORDER — ONDANSETRON 4 MG/1
4 TABLET, ORALLY DISINTEGRATING ORAL EVERY 8 HOURS PRN
Status: DISCONTINUED | OUTPATIENT
Start: 2025-05-19 | End: 2025-05-21 | Stop reason: HOSPADM

## 2025-05-19 RX ORDER — ENOXAPARIN SODIUM 100 MG/ML
30 INJECTION SUBCUTANEOUS 2 TIMES DAILY
Status: DISCONTINUED | OUTPATIENT
Start: 2025-05-19 | End: 2025-05-21 | Stop reason: HOSPADM

## 2025-05-19 RX ORDER — ONDANSETRON 2 MG/ML
4 INJECTION INTRAMUSCULAR; INTRAVENOUS EVERY 6 HOURS PRN
Status: DISCONTINUED | OUTPATIENT
Start: 2025-05-19 | End: 2025-05-21 | Stop reason: HOSPADM

## 2025-05-19 RX ADMIN — SODIUM CHLORIDE: 0.9 INJECTION, SOLUTION INTRAVENOUS at 20:28

## 2025-05-19 SDOH — ECONOMIC STABILITY: FOOD INSECURITY: WITHIN THE PAST 12 MONTHS, THE FOOD YOU BOUGHT JUST DIDN'T LAST AND YOU DIDN'T HAVE MONEY TO GET MORE.: NEVER TRUE

## 2025-05-19 SDOH — HEALTH STABILITY: PHYSICAL HEALTH: ON AVERAGE, HOW MANY DAYS PER WEEK DO YOU ENGAGE IN MODERATE TO STRENUOUS EXERCISE (LIKE A BRISK WALK)?: 0 DAYS

## 2025-05-19 SDOH — ECONOMIC STABILITY: FOOD INSECURITY: WITHIN THE PAST 12 MONTHS, YOU WORRIED THAT YOUR FOOD WOULD RUN OUT BEFORE YOU GOT MONEY TO BUY MORE.: NEVER TRUE

## 2025-05-19 SDOH — HEALTH STABILITY: PHYSICAL HEALTH: ON AVERAGE, HOW MANY MINUTES DO YOU ENGAGE IN EXERCISE AT THIS LEVEL?: 0 MIN

## 2025-05-19 ASSESSMENT — PAIN - FUNCTIONAL ASSESSMENT: PAIN_FUNCTIONAL_ASSESSMENT: NONE - DENIES PAIN

## 2025-05-19 ASSESSMENT — ENCOUNTER SYMPTOMS
COUGH: 0
NAUSEA: 0
SORE THROAT: 0
ABDOMINAL PAIN: 0
BACK PAIN: 0
BLOOD IN STOOL: 0
VOMITING: 0
CONSTIPATION: 0
SHORTNESS OF BREATH: 0
DIARRHEA: 0

## 2025-05-19 ASSESSMENT — PATIENT HEALTH QUESTIONNAIRE - PHQ9
SUM OF ALL RESPONSES TO PHQ QUESTIONS 1-9: 0
1. LITTLE INTEREST OR PLEASURE IN DOING THINGS: NOT AT ALL
2. FEELING DOWN, DEPRESSED OR HOPELESS: NOT AT ALL

## 2025-05-19 NOTE — PATIENT INSTRUCTIONS
SURVEY:    You may be receiving a survey from [facility] regarding your visit today.    You may get this in the mail, through your MyChart or in your email.     Please complete the survey to enable us to provide the highest quality of care to you and your family.      Thank you.    Clinical Care Team:         ANGEL Marcelino-ESPINOZA Canales                         Triage:         ESPINOZA Dave             Clerical Team:        Crystal Moe       [place] [phone]           Billing questions: [phone]           Medical Records Request: [phone]

## 2025-05-19 NOTE — ED PROVIDER NOTES
interpreted by me as: As above    See more data below for the lab and radiology tests and orders.    3)  Treatment and Disposition    Patient repeat assessment:  As above    Disposition discussion with patient/family: Transfer    Case discussed with consulting clinician:  As above    Social determinants of health impacting treatment or disposition: N/A    Shared Decision Making: As above    Code Status Discussion: N/A      REASSESSMENT     As above      CRITICAL CARE TIME     Total Critical Care time was 0 minutes, excluding separately reportable procedures.  There was a high probability of clinically significant/life threatening deterioration in the patient's condition which required my urgent intervention.      PROCEDURES:  Unless otherwise noted below, none     Procedures    FINAL IMPRESSION      1. Cardiac arrhythmia, unspecified cardiac arrhythmia type          DISPOSITION/PLAN     DISPOSITION Decision To Transfer 05/19/2025 08:01:52 PM   DISPOSITION CONDITION Stable           PATIENT REFERRED TO:  No follow-up provider specified.    DISCHARGE MEDICATIONS:  Discharge Medication List as of 5/19/2025 10:21 PM          (Please note that portions of this note were completed with a voice recognition program.  Efforts were made to edit the dictations but occasionally words are mis-transcribed.)    Juanito Kovacs MD (electronically signed)  Attending Emergency Physician           Juanito Kovacs MD  05/20/25 [id number]

## 2025-05-19 NOTE — PROGRESS NOTES
HPI Notes    Name: Esperanza Ochoa  : 2000         Chief Complaint:     Chief Complaint   Patient presents with    Chest Pain     Notes that for the past 2 weeks at work mainly it feels as if someone is stabbing him in the heart.        History of Present Illness:        HPI  Pt is a 25 yo male who presents as a new pt.  pt developed A-Fib. It was suspected that it was due to gallbladder causing stress on the body. Pt had struggled with gallbladder disease and pancreatitis for several months. Finally had gallbladder removed in 2022. 2 weeks ago started feeling some chest pain. Describes as \"someone poking me in the heart\". Mostly happens at work. Usually lasts 5-10 minutes.     Pt has struggled with EDINSON since age 16 with cigarettes and alcohol. Progressed to marijuana, LSD, and eventually methamphetamines. Last meth use 2022. Currently uses marijuana every day.     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     Past Medical History:     Past Medical History:   Diagnosis Date    Bipolar disorder (HCC)     Depression     Drug abuse (HCC)     Gallstone     Pancreatitis     Substance abuse (HCC)       Reviewed all health maintenance requirements and ordered appropriate tests  Health Maintenance Due   Topic Date Due    Depression Screen  Never done    Varicella vaccine (1 of 2 - 13+ 2-dose series) Never done    HPV vaccine (1 - Male 3-dose series) Never done    HIV screen  Never done    Meningococcal B vaccine (1 of 2 - Standard) Never done    Hepatitis C screen  Never done    Hepatitis B vaccine (1 of 3 - 19+ 3-dose series) Never done    DTaP/Tdap/Td vaccine (1 - Tdap) Never done    Pneumococcal 0-49 years Vaccine (1 of 2 - PCV) Never done    COVID-19 Vaccine ( - - season) Never done       Past Surgical History:     Past Surgical History:   Procedure Laterality Date    CHOLECYSTECTOMY      CHOLECYSTECTOMY, LAPAROSCOPIC N/A 10/1/2022    LAPAROSCOPIC CHOLECYSTECTOMY,

## 2025-05-20 ENCOUNTER — RESULTS FOLLOW-UP (OUTPATIENT)
Dept: FAMILY MEDICINE CLINIC | Age: 25
End: 2025-05-20

## 2025-05-20 PROBLEM — R55 SYNCOPE AND COLLAPSE: Status: ACTIVE | Noted: 2025-05-20

## 2025-05-20 LAB
ANION GAP SERPL CALCULATED.3IONS-SCNC: 10 MMOL/L (ref 9–16)
BUN SERPL-MCNC: 11 MG/DL (ref 6–20)
CALCIUM SERPL-MCNC: 8.9 MG/DL (ref 8.6–10.4)
CHLORIDE SERPL-SCNC: 106 MMOL/L (ref 98–107)
CHOLEST SERPL-MCNC: 140 MG/DL (ref 0–199)
CHOLESTEROL/HDL RATIO: 3.6
CO2 SERPL-SCNC: 23 MMOL/L (ref 20–31)
CREAT SERPL-MCNC: 0.7 MG/DL (ref 0.7–1.2)
EKG ATRIAL RATE: 59 BPM
EKG ATRIAL RATE: 70 BPM
EKG ATRIAL RATE: 83 BPM
EKG P AXIS: 74 DEGREES
EKG P AXIS: 78 DEGREES
EKG P AXIS: 78 DEGREES
EKG P-R INTERVAL: 182 MS
EKG P-R INTERVAL: 182 MS
EKG P-R INTERVAL: 186 MS
EKG Q-T INTERVAL: 364 MS
EKG Q-T INTERVAL: 376 MS
EKG Q-T INTERVAL: 404 MS
EKG QRS DURATION: 114 MS
EKG QRS DURATION: 114 MS
EKG QRS DURATION: 116 MS
EKG QTC CALCULATION (BAZETT): 393 MS
EKG QTC CALCULATION (BAZETT): 399 MS
EKG QTC CALCULATION (BAZETT): 441 MS
EKG R AXIS: 69 DEGREES
EKG R AXIS: 8 DEGREES
EKG R AXIS: 8 DEGREES
EKG T AXIS: 26 DEGREES
EKG T AXIS: 29 DEGREES
EKG T AXIS: 80 DEGREES
EKG VENTRICULAR RATE: 59 BPM
EKG VENTRICULAR RATE: 70 BPM
EKG VENTRICULAR RATE: 83 BPM
ERYTHROCYTE [DISTWIDTH] IN BLOOD BY AUTOMATED COUNT: 12.1 % (ref 11.8–14.4)
EST. AVERAGE GLUCOSE BLD GHB EST-MCNC: 100 MG/DL
EST. AVERAGE GLUCOSE BLD GHB EST-MCNC: 100 MG/DL
GFR, ESTIMATED: >90 ML/MIN/1.73M2
GLUCOSE SERPL-MCNC: 102 MG/DL (ref 74–99)
HBA1C MFR BLD: 5.1 % (ref 4–6)
HBA1C MFR BLD: 5.1 % (ref 4–6)
HCT VFR BLD AUTO: 43.8 % (ref 40.7–50.3)
HDLC SERPL-MCNC: 39 MG/DL
HGB BLD-MCNC: 14.6 G/DL (ref 13–17)
LDLC SERPL CALC-MCNC: 82 MG/DL (ref 0–100)
MAGNESIUM SERPL-MCNC: 2.2 MG/DL (ref 1.6–2.6)
MCH RBC QN AUTO: 30 PG (ref 25.2–33.5)
MCHC RBC AUTO-ENTMCNC: 33.3 G/DL (ref 28.4–34.8)
MCV RBC AUTO: 90.1 FL (ref 82.6–102.9)
MRSA, DNA, NASAL: NEGATIVE
NRBC BLD-RTO: 0 PER 100 WBC
PLATELET # BLD AUTO: 186 K/UL (ref 138–453)
PMV BLD AUTO: 10.3 FL (ref 8.1–13.5)
POTASSIUM SERPL-SCNC: 3.7 MMOL/L (ref 3.7–5.3)
RBC # BLD AUTO: 4.86 M/UL (ref 4.21–5.77)
SODIUM SERPL-SCNC: 139 MMOL/L (ref 136–145)
SPECIMEN DESCRIPTION: NORMAL
TRIGL SERPL-MCNC: 94 MG/DL
TROPONIN I SERPL HS-MCNC: <6 NG/L (ref 0–22)
TROPONIN I SERPL HS-MCNC: <6 NG/L (ref 0–22)
VLDLC SERPL CALC-MCNC: 19 MG/DL (ref 1–30)
WBC OTHER # BLD: 12.5 K/UL (ref 3.5–11.3)

## 2025-05-20 PROCEDURE — 84484 ASSAY OF TROPONIN QUANT: CPT

## 2025-05-20 PROCEDURE — 87641 MR-STAPH DNA AMP PROBE: CPT

## 2025-05-20 PROCEDURE — 6370000000 HC RX 637 (ALT 250 FOR IP): Performed by: NURSE PRACTITIONER

## 2025-05-20 PROCEDURE — 2500000003 HC RX 250 WO HCPCS: Performed by: NURSE PRACTITIONER

## 2025-05-20 PROCEDURE — 93010 ELECTROCARDIOGRAM REPORT: CPT | Performed by: INTERNAL MEDICINE

## 2025-05-20 PROCEDURE — 85027 COMPLETE CBC AUTOMATED: CPT

## 2025-05-20 PROCEDURE — 99222 1ST HOSP IP/OBS MODERATE 55: CPT | Performed by: STUDENT IN AN ORGANIZED HEALTH CARE EDUCATION/TRAINING PROGRAM

## 2025-05-20 PROCEDURE — 83036 HEMOGLOBIN GLYCOSYLATED A1C: CPT

## 2025-05-20 PROCEDURE — 80061 LIPID PANEL: CPT

## 2025-05-20 PROCEDURE — 2000000000 HC ICU R&B

## 2025-05-20 PROCEDURE — 80048 BASIC METABOLIC PNL TOTAL CA: CPT

## 2025-05-20 PROCEDURE — 36415 COLL VENOUS BLD VENIPUNCTURE: CPT

## 2025-05-20 PROCEDURE — 6360000002 HC RX W HCPCS: Performed by: NURSE PRACTITIONER

## 2025-05-20 PROCEDURE — 99223 1ST HOSP IP/OBS HIGH 75: CPT | Performed by: STUDENT IN AN ORGANIZED HEALTH CARE EDUCATION/TRAINING PROGRAM

## 2025-05-20 PROCEDURE — 83735 ASSAY OF MAGNESIUM: CPT

## 2025-05-20 RX ADMIN — ENOXAPARIN SODIUM 30 MG: 100 INJECTION SUBCUTANEOUS at 00:24

## 2025-05-20 RX ADMIN — ENOXAPARIN SODIUM 30 MG: 100 INJECTION SUBCUTANEOUS at 09:33

## 2025-05-20 RX ADMIN — SODIUM CHLORIDE, PRESERVATIVE FREE 10 ML: 5 INJECTION INTRAVENOUS at 09:34

## 2025-05-20 RX ADMIN — ATORVASTATIN CALCIUM 80 MG: 80 TABLET, FILM COATED ORAL at 00:26

## 2025-05-20 RX ADMIN — ENOXAPARIN SODIUM 30 MG: 100 INJECTION SUBCUTANEOUS at 21:20

## 2025-05-20 RX ADMIN — SODIUM CHLORIDE, PRESERVATIVE FREE 10 ML: 5 INJECTION INTRAVENOUS at 00:28

## 2025-05-20 RX ADMIN — SODIUM CHLORIDE, PRESERVATIVE FREE 10 ML: 5 INJECTION INTRAVENOUS at 21:25

## 2025-05-20 RX ADMIN — ASPIRIN 81 MG: 81 TABLET, CHEWABLE ORAL at 09:33

## 2025-05-20 RX ADMIN — ATORVASTATIN CALCIUM 80 MG: 80 TABLET, FILM COATED ORAL at 21:20

## 2025-05-20 ASSESSMENT — PAIN SCALES - GENERAL
PAINLEVEL_OUTOF10: 0
PAINLEVEL_OUTOF10: 0

## 2025-05-20 NOTE — ED NOTES
Patient states he had another brief sharp pain to the left side of his chest about 1 minute prior to RN in room. Pain is now gone. RN notified physician.

## 2025-05-20 NOTE — CARE COORDINATION
Case Management Assessment  Initial Evaluation    Date/Time of Evaluation: 5/20/2025 8:42 AM  Assessment Completed by: BERE KERN RN    If patient is discharged prior to next notation, then this note serves as note for discharge by case management.    Patient Name: Esperanza Ochoa                   YOB: 2000  Diagnosis: Cardiac dysrhythmia [I49.9]                   Date / Time: 5/19/2025 10:49 PM    Patient Admission Status: Inpatient   Readmission Risk (Low < 19, Mod (19-27), High > 27): Readmission Risk Score: 8.6    Current PCP: Surendra Post DNP  PCP verified by CM? (P) Yes    Chart Reviewed: Yes      History Provided by: (P) Significant Other  Patient Orientation: (P) Alert and Oriented    Patient Cognition: (P) Alert    Hospitalization in the last 30 days (Readmission):  No    If yes, Readmission Assessment in  Navigator will be completed.    Advance Directives:      Code Status: Full Code   Patient's Primary Decision Maker is: (P) Legal Next of Kin      Discharge Planning:    Patient lives with: (P) Spouse/Significant Other Type of Home: (P) House  Primary Care Giver: (P) Self  Patient Support Systems include: (P) Spouse/Significant Other   Current Financial resources:    Current community resources:    Current services prior to admission: (P) None            Current DME:              Type of Home Care services:  (P) None    ADLS  Prior functional level: (P) Independent in ADLs/IADLs  Current functional level: (P) Independent in ADLs/IADLs    PT AM-PAC:   /24  OT AM-PAC:   /24    Family can provide assistance at DC: (P) Yes  Would you like Case Management to discuss the discharge plan with any other family members/significant others, and if so, who? (P) Yes (Spouse)  Plans to Return to Present Housing: (P) Yes  Other Identified Issues/Barriers to RETURNING to current housing: Cardiac dysrhythmia  Potential Assistance needed at discharge: (P) N/A            Potential

## 2025-05-20 NOTE — ED TRIAGE NOTES
Patient ambulated into the ED. Patient's skin tone had a pale appearance. Patient informed RN of a syncopal episode at work. Patient states he has been having sharp chest pain to the left side of his chest for the last 2 weeks. Patient states he had a syncopal episode at work a few seconds after having the brief sharp chest pain. 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 Patient has no injury mohit to syncopal episode and unknown down time. While on the ED cart patient had a syncopal episode while in the ED that lasted approximately 10-15 seconds. Patient had 2 episodes of asystole witness by RN and another staff member during syncopal episode. Patient had a 7 second asystole event and then a 5 second asystole event  by a heart beat. Patient spontaneously regained regular cardiac rhythm. Patient was not aware of what had happened after regaining consciousness.

## 2025-05-20 NOTE — PLAN OF CARE
Problem: Discharge Planning  Goal: Discharge to home or other facility with appropriate resources  Outcome: Progressing     Problem: Skin/Tissue Integrity  Goal: Skin integrity remains intact  Description: 1.  Monitor for areas of redness and/or skin breakdown2.  Assess vascular access sites hourly3.  Every 4-6 hours minimum:  Change oxygen saturation probe site4.  Every 4-6 hours:  If on nasal continuous positive airway pressure, respiratory therapy assess nares and determine need for appliance change or resting period  Outcome: Progressing      History  Chief Complaint   Patient presents with    Shortness of Breath     hemoptosis     Patient is a 57-year-old male, past medical history of known lung masses currently undergoing evaluation, Afib on Eliquis, COPD, on 3 L nasal cannula continuously, who presents to the emergency department for chest pain  Patient states chest pain started yesterday afternoon  He describes it as a heavy pressure in the mid chest   Pain does not radiate  Associated symptoms include increased shortness of breath (increased from baseline) and coughing up blood for the past several days  Patient denies any prior history of coughing up blood  He states it has been relatively small amounts and tissues, but has been coughing it up relatively frequently  Patient denies any fevers, chills, nausea, diarrhea, or any other new or concerning symptoms  Per chart review, patient was recently admitted from 08/18/2021 to 08/24/2021 for AFib with RVR  He was ultimately loaded with digoxin, and discharged on Eliquis  Prior to Admission Medications   Prescriptions Last Dose Informant Patient Reported? Taking?    Alcohol Swabs ( Sterile Alcohol Prep) PADS  Self Yes No   Sig: Use as directed   Diclofenac Sodium (VOLTAREN) 1 %  Self No No   Sig: Apply 2 g topically 4 (four) times a day   Eliquis 5 MG  Self No No   Sig: TAKE ONE TABLET (5 MG TOTAL) BY MOUTH 2 (TWO) TIMES A DAY   Omega-3 Fatty Acids (FISH OIL) 1,000 mg  Self Yes No   Sig: Take 1,000 mg by mouth 2 (two) times a day   acetaminophen (TYLENOL) 500 mg tablet   No No   Sig: Take 2 tablets (1,000 mg total) by mouth every 8 (eight) hours   albuterol (2 5 mg/3 mL) 0 083 % nebulizer solution  Self No No   Sig: Take 3 mL (2 5 mg total) by nebulization every 6 (six) hours as needed for wheezing or shortness of breath   benzonatate (TESSALON PERLES) 100 mg capsule  Self No No   Sig: Take 1 capsule (100 mg total) by mouth 3 (three) times a day as needed for cough dextromethorphan-guaiFENesin (ROBITUSSIN DM)  mg/5 mL syrup  Self No No   Sig: Take 10 mL by mouth every 4 (four) hours as needed for cough   digoxin (LANOXIN) 0 125 mg tablet   No No   Sig: Alternate 125mcg and 250mcg daily     gabapentin (NEURONTIN) 100 mg capsule  Self No No   Sig: Take 1 capsule (100 mg total) by mouth daily at bedtime   glipiZIDE (GLUCOTROL) 5 mg tablet  Self Yes No   Sig: Take 5 mg by mouth 2 (two) times a day   guaiFENesin (MUCINEX) 600 mg 12 hr tablet  Self No No   Sig: Take 1 tablet (600 mg total) by mouth 2 (two) times a day   hydrOXYzine HCL (ATARAX) 25 mg tablet  Self Yes No   Sig: Take 25 mg by mouth 3 (three) times a day as needed   levothyroxine 150 mcg tablet  Self Yes No   Sig: Take 150 mcg by mouth daily   metFORMIN (GLUCOPHAGE) 500 mg tablet  Self No No   Sig: Take 1 tablet (500 mg total) by mouth 2 (two) times a day with meals   metoprolol succinate (TOPROL-XL) 50 mg 24 hr tablet   No No   Sig: Take 1 tablet (50 mg total) by mouth every 12 (twelve) hours   pantoprazole (PROTONIX) 40 mg tablet  Self No No   Sig: Take 1 tablet (40 mg total) by mouth daily   senna (SENOKOT) 8 6 mg   No No   Sig: Take 1 tablet (8 6 mg total) by mouth daily at bedtime      Facility-Administered Medications: None       Past Medical History:   Diagnosis Date    Hypertension        Past Surgical History:   Procedure Laterality Date    BACK SURGERY      CARPAL TUNNEL RELEASE Bilateral     IR BIOPSY BONE MARROW  6/10/2021    IR BIOPSY LUNG  7/21/2021    IR BIOPSY LUNG  8/23/2021    IR BIOPSY OTHER  7/27/2021    IR THORACENTESIS  7/30/2021    LUMBAR DISC SURGERY         Family History   Problem Relation Age of Onset    Cancer Mother         "ate away her bone"    Anuerysm Father     Cancer Sister         unknown type    Heart attack Maternal Grandfather     Cancer Sister         unknown type    Heart attack Maternal Aunt     Heart attack Maternal Uncle     Heart attack Paternal Aunt I have reviewed and agree with the history as documented  E-Cigarette/Vaping    E-Cigarette Use Never User      E-Cigarette/Vaping Substances     Social History     Tobacco Use    Smoking status: Former Smoker     Packs/day: 0 50     Years: 40 00     Pack years: 20 00     Types: Cigarettes     Start date: 14     Quit date: 2021     Years since quittin 2    Smokeless tobacco: Never Used   Vaping Use    Vaping Use: Never used   Substance Use Topics    Alcohol use: Not Currently     Comment: History of heavier drinking in early   Denies any current alcohol use (Updated 2021)   Drug use: Never        Review of Systems   Constitutional: Negative for chills and fever  HENT: Negative for sore throat and trouble swallowing  Respiratory: Positive for cough and shortness of breath  Cardiovascular: Positive for chest pain  Negative for leg swelling  Gastrointestinal: Negative for abdominal pain, diarrhea, nausea and vomiting  Genitourinary: Negative for difficulty urinating, dysuria and hematuria  Musculoskeletal: Negative for neck pain and neck stiffness  Skin: Negative for rash and wound  Neurological: Negative for dizziness and headaches  All other systems reviewed and are negative        Physical Exam  ED Triage Vitals   Temperature Pulse Respirations Blood Pressure SpO2   21 1434 21 1434 21 1434 21 1434 21 1434   99 °F (37 2 °C) (!) 125 (!) 30 136/90 90 %      Temp Source Heart Rate Source Patient Position - Orthostatic VS BP Location FiO2 (%)   21 1434 21 1434 21 1434 21 1434 --   Tympanic Monitor Lying Right arm       Pain Score       21 1934       2             Orthostatic Vital Signs  Vitals:    21 0732 21 0850 21 1120 21 1435   BP: 118/75 126/68 110/71 (!) 110/46   Pulse: (!) 114 104 98 (!) 130   Patient Position - Orthostatic VS:           Physical Exam  Vitals and nursing note reviewed  Constitutional:       Appearance: He is ill-appearing  He is not diaphoretic  HENT:      Head: Normocephalic and atraumatic  Right Ear: External ear normal       Left Ear: External ear normal       Nose: Nose normal    Eyes:      General: Lids are normal  No scleral icterus  Comments: Conjunctival pallor   Cardiovascular:      Rate and Rhythm: Tachycardia present  Rhythm irregular  Heart sounds: Normal heart sounds  No murmur heard  No friction rub  No gallop  Pulmonary:      Effort: Tachypnea and accessory muscle usage present  Breath sounds: Examination of the right-upper field reveals decreased breath sounds  Examination of the right-middle field reveals decreased breath sounds  Examination of the right-lower field reveals decreased breath sounds  Decreased breath sounds present  No wheezing or rales  Abdominal:      Palpations: Abdomen is soft  Tenderness: There is no guarding or rebound  Musculoskeletal:         General: No deformity  Normal range of motion  Cervical back: Normal range of motion and neck supple  Right lower leg: No edema  Left lower leg: No edema  Skin:     General: Skin is warm and dry  Neurological:      General: No focal deficit present  Mental Status: He is alert     Psychiatric:         Mood and Affect: Mood normal          Behavior: Behavior normal          ED Medications  Medications   gabapentin (NEURONTIN) capsule 100 mg (100 mg Oral Given 8/30/21 2136)   guaiFENesin (MUCINEX) 12 hr tablet 600 mg (600 mg Oral Given 8/31/21 0858)   levothyroxine tablet 150 mcg (150 mcg Oral Given 8/31/21 0526)   metoprolol succinate (TOPROL-XL) 24 hr tablet 50 mg (50 mg Oral Given 8/31/21 0858)   fish oil capsule 1,000 mg (1,000 mg Oral Given 8/31/21 0858)   pantoprazole (PROTONIX) EC tablet 40 mg (40 mg Oral Given 8/31/21 0621)   senna (SENOKOT) tablet 8 6 mg ( Oral Canceled Entry 8/30/21 2200)   albuterol inhalation solution 2 5 mg (2 5 mg Nebulization Given 8/31/21 0121)   digoxin (LANOXIN) tablet 125 mcg (125 mcg Oral Given 8/31/21 0858)   benzonatate (TESSALON PERLES) capsule 100 mg (100 mg Oral Given 8/31/21 0149)   dextromethorphan-guaiFENesin (ROBITUSSIN DM) oral syrup 10 mL (10 mL Oral Given 8/31/21 0858)   hydrOXYzine HCL (ATARAX) tablet 25 mg (has no administration in time range)   insulin lispro (HumaLOG) 100 units/mL subcutaneous injection 1-5 Units (1 Units Subcutaneous Not Given 8/31/21 1100)   ondansetron (ZOFRAN) injection 4 mg (has no administration in time range)   acetaminophen (TYLENOL) tablet 650 mg (has no administration in time range)   levalbuterol (XOPENEX) inhalation solution 1 25 mg (1 25 mg Nebulization Given 8/31/21 1329)   sodium chloride 0 9 % inhalation solution 3 mL (3 mL Nebulization Given 8/31/21 1329)   phenol (CHLORASEPTIC) 1 4 % mucosal liquid 1 spray (has no administration in time range)   sodium chloride (OCEAN) 0 65 % nasal spray 1 spray (has no administration in time range)   HYDROmorphone HCl (DILAUDID) injection 0 2 mg (has no administration in time range)   HYDROmorphone (DILAUDID) injection 0 5 mg (0 5 mg Intravenous Given 8/30/21 1515)   iohexol (OMNIPAQUE) 350 MG/ML injection (MULTI-DOSE) 85 mL (85 mL Intravenous Given 8/30/21 1803)   dextrose 50 % IV solution 50 mL (50 mL Intravenous Given 8/30/21 2030)   dextrose 50 % IV solution 25 mL (25 mL Intravenous Given 8/31/21 0008)   albumin human (FLEXBUMIN) 25 % injection 12 5 g (0 g Intravenous Stopped 8/31/21 0401)   dextrose 50 % IV solution 50 mL (50 mL Intravenous Given 8/31/21 0437)   methylPREDNISolone sodium succinate (Solu-MEDROL) injection 125 mg (125 mg Intravenous Given 8/31/21 1349)       Diagnostic Studies  Results Reviewed     Procedure Component Value Units Date/Time    CBC and differential [[id number]]  (Abnormal) Collected: 08/30/21 1450    Lab Status: Final result Specimen: Blood from Arm, Left Updated: 08/30/21 1656     WBC 12 05 Thousand/uL      RBC 3 40 Million/uL      Hemoglobin 8 2 g/dL      Hematocrit 29 1 %      MCV 86 fL      MCH 24 1 pg      MCHC 28 2 g/dL      RDW 18 2 %      MPV 9 6 fL      Platelets 053 Thousands/uL     Narrative: This is an appended report  These results have been appended to a previously verified report      Manual Differential(PHLEBS Do Not Order) [[id number]]  (Abnormal) Collected: 08/30/21 1450    Lab Status: Final result Specimen: Blood from Arm, Left Updated: 08/30/21 1656     Segmented % 92 %      Lymphocytes % 6 %      Monocytes % 2 %      Eosinophils, % 0 %      Basophils % 0 %      Absolute Neutrophils 11 09 Thousand/uL      Lymphocytes Absolute 0 72 Thousand/uL      Monocytes Absolute 0 24 Thousand/uL      Eosinophils Absolute 0 00 Thousand/uL      Basophils Absolute 0 00 Thousand/uL      Total Counted --     Anisocytosis Present     Polychromasia Present     Platelet Estimate Adequate    Comprehensive metabolic panel [[id number]]  (Abnormal) Collected: 08/30/21 1450    Lab Status: Final result Specimen: Blood from Arm, Left Updated: 08/30/21 1539     Sodium 137 mmol/L      Potassium 4 6 mmol/L      Chloride 101 mmol/L      CO2 31 mmol/L      ANION GAP 5 mmol/L      BUN 29 mg/dL      Creatinine 1 36 mg/dL      Glucose 83 mg/dL      Calcium 9 7 mg/dL      Corrected Calcium 11 8 mg/dL      AST 15 U/L      ALT 21 U/L      Alkaline Phosphatase 147 U/L      Total Protein 6 9 g/dL      Albumin 1 4 g/dL      Total Bilirubin 0 52 mg/dL      eGFR 49 ml/min/1 73sq m     Narrative:      [place] guidelines for Chronic Kidney Disease (CKD):     Stage 1 with normal or high GFR (GFR > 90 mL/min/1 73 square meters)    Stage 2 Mild CKD (GFR = 60-89 mL/min/1 73 square meters)    Stage 3A Moderate CKD (GFR = 45-59 mL/min/1 73 square meters)    Stage 3B Moderate CKD (GFR = 30-44 mL/min/1 73 square meters)    Stage 4 Severe CKD (GFR = 15-29 mL/min/1 73 square meters)    Stage 5 End Stage CKD (GFR <15 mL/min/1 73 square meters)  Note: GFR calculation is accurate only with a steady state creatinine    Troponin I [[id number]]  (Normal) Collected: 08/30/21 1450    Lab Status: Final result Specimen: Blood from Arm, Left Updated: 08/30/21 1530     Troponin I <0 02 ng/mL     Protime-INR [[id number]]  (Abnormal) Collected: 08/30/21 1450    Lab Status: Final result Specimen: Blood from Arm, Left Updated: 08/30/21 1527     Protime 25 2 seconds      INR 2 30    APTT [[id number]]  (Abnormal) Collected: 08/30/21 1450    Lab Status: Final result Specimen: Blood from Arm, Left Updated: 08/30/21 1527     PTT 47 seconds                  CT chest with contrast   Final Result by Aleksey Nunez MD (08/30 1829)      Interval progression of disease  Large ill-defined infiltrative right lung mass now occludes the right lower lobe bronchus  There is complete occlusion of the distal right mainstem bronchus  There is resultant atelectasis and a stable moderate right    pleural effusion  Also of note is progression of bubbles of gas within the right lung mass  Cannot exclude necrosis or infection  Interval increase in size of multiple masses and nodules in the left lung  The study was marked in [facility] for immediate notification           Workstation performed: [id number]               Procedures  ECG 12 Lead Documentation Only    Date/Time: 8/30/2021 2:33 PM  Performed by: Shayne Horta DO  Authorized by: Shayne Horta DO     ECG reviewed by me, the ED Provider: yes    Patient location:  ED  Interpretation:     Interpretation: abnormal    Rate:     ECG rate:  106    ECG rate assessment: tachycardic    Rhythm:     Rhythm: atrial fibrillation    Ectopy:     Ectopy: none    QRS:     QRS axis:  Normal  Conduction:     Conduction: normal    ST segments:     ST segments:  Non-specific  T waves:     T waves: non-specific            ED Course  ED Course as of Aug 31 1530   Mon Aug 30, 2021   1523 Hemoglobin(!): 8 2   1523 WBC(!): 12 05   1527 INR(!): 2 30   1527 PTT(!): 47   1531 Troponin I: <0 02   1542 Creatinine(!): 1 36   1542 BUN(!): 29   1542 Sodium: 137   1656 Patient was re-evaluated  Resting comfortably  No new complaints at this time  1657 Segs Relative(!): 92                             SBIRT 22yo+      Most Recent Value   SBIRT (25 yo +)   In order to provide better care to our patients, we are screening all of our patients for alcohol and drug use  Would it be okay to ask you these screening questions? No Filed at: 08/30/2021 9403                [facility]  Number of Diagnoses or Management Options  Chest pain  Hemoptysis  Diagnosis management comments: Patient is a 68 y o  male who presents to the ED for chest pain, shortness of breath, and coughing up blood  Significant physical exam findings include decreased breath sounds over the right lung fields  Hemoptysis likely secondary to bleeding from patient's known lung malignancy in the setting of Eliquis  Plan:  Labs, CT chest with contrast, pain control p r n , admit                 Portions of the record may have been created with voice recognition software  Occasional wrong word or "sound a like" substitutions may have occurred due to the inherent limitations of voice recognition software  Read the chart carefully and recognize, using context, where substitutions have occurred           Amount and/or Complexity of Data Reviewed  Clinical lab tests: ordered and reviewed  Tests in the radiology section of CPT®: reviewed and ordered  Tests in the medicine section of CPT®: ordered and reviewed  Independent visualization of images, tracings, or specimens: yes    Risk of Complications, Morbidity, and/or Mortality  Presenting problems: high  Diagnostic procedures: high  Management options: high    Patient Progress  Patient progress: stable      Disposition  Final diagnoses:   Hemoptysis   Chest pain     Time reflects when diagnosis was documented in both MDM as applicable and the Disposition within this note     Time User Action Codes Description Comment    8/30/2021  6:37 PM [facility] Add [R04 2] Hemoptysis     8/30/2021  6:38 PM [facility] Add [R07 9] Chest pain     8/31/2021 12:44 PM Cathy Lutz Add [R91 8] Mass of right lung       ED Disposition     ED Disposition Condition Date/Time Comment    Admit Stable Mon Aug 30, 2021  6:37 PM Case was discussed with Dr Shimon Haddad and the patient's admission status was agreed to be Admission Status: observation status to the service of Dr Shimon Haddad   Follow-up Information    None         Current Discharge Medication List      CONTINUE these medications which have NOT CHANGED    Details   acetaminophen (TYLENOL) 500 mg tablet Take 2 tablets (1,000 mg total) by mouth every 8 (eight) hours  Qty: 180 tablet, Refills: 0    Associated Diagnoses: Mass of right lung      albuterol (2 5 mg/3 mL) 0 083 % nebulizer solution Take 3 mL (2 5 mg total) by nebulization every 6 (six) hours as needed for wheezing or shortness of breath  Qty: 360 mL, Refills: 0    Associated Diagnoses: Mass of right lung; COPD (chronic obstructive pulmonary disease) ([facility])      Alcohol Swabs ( Sterile Alcohol Prep) PADS Use as directed      benzonatate (TESSALON PERLES) 100 mg capsule Take 1 capsule (100 mg total) by mouth 3 (three) times a day as needed for cough  Qty: 20 capsule, Refills: 0    Associated Diagnoses: Mass of right lung      dextromethorphan-guaiFENesin (ROBITUSSIN DM)  mg/5 mL syrup Take 10 mL by mouth every 4 (four) hours as needed for cough  Qty: 473 mL, Refills: 0    Associated Diagnoses: COPD (chronic obstructive pulmonary disease) ([facility])      Diclofenac Sodium (VOLTAREN) 1 % Apply 2 g topically 4 (four) times a day  Qty: 350 g, Refills: 0    Associated Diagnoses: Right flank pain      digoxin (LANOXIN) 0 125 mg tablet Alternate 125mcg and 250mcg daily    Qty: 45 tablet, Refills: 3    Associated Diagnoses: Atrial fibrillation with RVR (HCC)      Eliquis 5 MG TAKE ONE TABLET (5 MG TOTAL) BY MOUTH 2 (TWO) TIMES A DAY  Qty: 60 tablet, Refills: 1    Associated Diagnoses: Paroxysmal A-fib (HCC)      gabapentin (NEURONTIN) 100 mg capsule Take 1 capsule (100 mg total) by mouth daily at bedtime  Qty: 30 capsule, Refills: 0    Associated Diagnoses: Right flank pain      glipiZIDE (GLUCOTROL) 5 mg tablet Take 5 mg by mouth 2 (two) times a day      guaiFENesin (MUCINEX) 600 mg 12 hr tablet Take 1 tablet (600 mg total) by mouth 2 (two) times a day  Qty: 30 tablet, Refills: 0    Associated Diagnoses: Mass of right lung      hydrOXYzine HCL (ATARAX) 25 mg tablet Take 25 mg by mouth 3 (three) times a day as needed      levothyroxine 150 mcg tablet Take 150 mcg by mouth daily      metFORMIN (GLUCOPHAGE) 500 mg tablet Take 1 tablet (500 mg total) by mouth 2 (two) times a day with meals  Qty: 60 tablet, Refills: 0    Associated Diagnoses: New onset type 2 diabetes mellitus (HCC)      metoprolol succinate (TOPROL-XL) 50 mg 24 hr tablet Take 1 tablet (50 mg total) by mouth every 12 (twelve) hours  Qty: 60 tablet, Refills: 0    Associated Diagnoses: Atrial fibrillation with RVR (HCC)      Omega-3 Fatty Acids (FISH OIL) 1,000 mg Take 1,000 mg by mouth 2 (two) times a day      pantoprazole (PROTONIX) 40 mg tablet Take 1 tablet (40 mg total) by mouth daily  Qty: 30 tablet, Refills: 0    Associated Diagnoses: Thrombocytopenia ([address] ); Acute ITP (HCC)      senna (SENOKOT) 8 6 mg Take 1 tablet (8 6 mg total) by mouth daily at bedtime  Qty: 30 tablet, Refills: 0    Associated Diagnoses: Constipation           No discharge procedures on file  PDMP Review       Value Time User    PDMP Reviewed  Yes 8/30/2021  8:26 PM An Love DO           ED Provider  Attending physically available and evaluated Mamadou Zavala I managed the patient along with the ED Attending      Electronically Signed by         Lidya Gonzales DO  08/31/21 4910

## 2025-05-20 NOTE — PLAN OF CARE
Problem: Discharge Planning  Goal: Discharge to home or other facility with appropriate resources  Outcome: Progressing     Problem: Skin/Tissue Integrity  Goal: Skin integrity remains intact  Description: 1.  Monitor for areas of redness and/or skin breakdown2.  Assess vascular access sites hourly3.  Every 4-6 hours minimum:  Change oxygen saturation probe site4.  Every 4-6 hours:  If on nasal continuous positive airway pressure, respiratory therapy assess nares and determine need for appliance change or resting period  Outcome: Progressing     Problem: Safety - Adult  Goal: Free from fall injury  Outcome: Progressing   Electronically signed by Brenda Packer RN on 5/20/2025 at 7:18 PM

## 2025-05-20 NOTE — H&P
Take 1 tablet by mouth 3 times daily as needed for Nausea or Vomiting 24   Josue Lizama MD        Allergies:     Augmentin [amoxicillin-pot clavulanate]    Social History:     Tobacco:    reports that he has been smoking cigarettes. He started smoking about 10 years ago. He has a 10.4 pack-year smoking history. He has never used smokeless tobacco.  Alcohol:      reports current alcohol use.  Drug Use:  reports current drug use. Drug: Marijuana (Weed).    Family History:     Family History   Problem Relation Age of Onset    High Blood Pressure Mother     Alcohol Abuse Father     Heart Attack Maternal Grandfather        Review of Systems:     Positive and Negative as described in HPI.      Physical Exam:   BP (!) 159/64   Temp (24hrs), Av.9 °F (36.6 °C), Min:97.9 °F (36.6 °C), Max:97.9 °F (36.6 °C)    Recent Labs     25  195   POCGLU 117*     No intake or output data in the 24 hours ending 25 0123    General Appearance: alert, tired appearing not in distress  Mental status: oriented to person, place, and time  Head: normocephalic, atraumatic  Eye: no icterus, redness, pupils equal and reactive, extraocular eye movements intact, conjunctiva clear  Ear: normal external ear, no discharge, hearing intact  Nose: no drainage noted  Mouth: mucous membranes moist  Neck: supple, no carotid bruits, thyroid not palpable  Lungs: Bilateral equal air entry, clear to ausculation, no wheezing, rales or rhonchi, normal effort  Cardiovascular: normal rate, regular rhythm, no murmur, gallop, rub  Abdomen: Soft, nontender, nondistended, normal bowel sounds, no hepatomegaly or splenomegaly  Neurologic: There are no new focal motor or sensory deficits, normal muscle tone and bulk, no abnormal sensation, normal speech, cranial nerves II through XII grossly intact  Skin: No gross lesions, rashes, bruising or bleeding on exposed skin area  Extremities: peripheral pulses palpable, no pedal edema or calf pain with

## 2025-05-21 ENCOUNTER — APPOINTMENT (OUTPATIENT)
Age: 25
DRG: 312 | End: 2025-05-21
Attending: INTERNAL MEDICINE
Payer: COMMERCIAL

## 2025-05-21 ENCOUNTER — APPOINTMENT (OUTPATIENT)
Dept: CT IMAGING | Age: 25
DRG: 312 | End: 2025-05-21
Attending: INTERNAL MEDICINE
Payer: COMMERCIAL

## 2025-05-21 VITALS
WEIGHT: 229 LBS | BODY MASS INDEX: 29.39 KG/M2 | TEMPERATURE: 98.6 F | SYSTOLIC BLOOD PRESSURE: 137 MMHG | OXYGEN SATURATION: 98 % | RESPIRATION RATE: 15 BRPM | HEIGHT: 74 IN | HEART RATE: 87 BPM | DIASTOLIC BLOOD PRESSURE: 92 MMHG

## 2025-05-21 PROBLEM — F10.21 HISTORY OF ALCOHOLISM (HCC): Status: ACTIVE | Noted: 2025-05-21

## 2025-05-21 PROBLEM — F12.90 MARIJUANA USE: Status: ACTIVE | Noted: 2025-05-21

## 2025-05-21 PROBLEM — Z72.0 TOBACCO USE: Status: ACTIVE | Noted: 2025-05-21

## 2025-05-21 PROBLEM — I45.5 SINUS PAUSE: Status: ACTIVE | Noted: 2025-05-21

## 2025-05-21 PROBLEM — R55 VASOVAGAL SYNCOPE: Status: ACTIVE | Noted: 2025-05-21

## 2025-05-21 PROBLEM — Z86.79 HISTORY OF ATRIAL FIBRILLATION: Status: ACTIVE | Noted: 2025-05-21

## 2025-05-21 PROBLEM — F19.91 HISTORY OF DRUG USE: Status: ACTIVE | Noted: 2025-05-21

## 2025-05-21 PROBLEM — R55 SYNCOPE: Status: ACTIVE | Noted: 2025-05-21

## 2025-05-21 PROBLEM — R00.1 BRADYCARDIA: Status: ACTIVE | Noted: 2025-05-21

## 2025-05-21 LAB
ECHO AO ROOT DIAM: 2.9 CM
ECHO AO ROOT INDEX: 1.26 CM/M2
ECHO AV AREA PEAK VELOCITY: 2.2 CM2
ECHO AV AREA VTI: 2.3 CM2
ECHO AV AREA/BSA PEAK VELOCITY: 1 CM2/M2
ECHO AV AREA/BSA VTI: 1 CM2/M2
ECHO AV MEAN GRADIENT: 4 MMHG
ECHO AV MEAN VELOCITY: 0.9 M/S
ECHO AV PEAK GRADIENT: 10 MMHG
ECHO AV PEAK VELOCITY: 1.6 M/S
ECHO AV VELOCITY RATIO: 0.69
ECHO AV VTI: 27.6 CM
ECHO BSA: 2.33 M2
ECHO EST RA PRESSURE: 3 MMHG
ECHO LA AREA 2C: 17 CM2
ECHO LA AREA 4C: 16.3 CM2
ECHO LA DIAMETER INDEX: 1.61 CM/M2
ECHO LA DIAMETER: 3.7 CM
ECHO LA MAJOR AXIS: 5 CM
ECHO LA MINOR AXIS: 4.5 CM
ECHO LA TO AORTIC ROOT RATIO: 1.28
ECHO LA VOL BP: 47 ML (ref 18–58)
ECHO LA VOL MOD A2C: 49 ML (ref 18–58)
ECHO LA VOL MOD A4C: 41 ML (ref 18–58)
ECHO LA VOL/BSA BIPLANE: 20 ML/M2 (ref 16–34)
ECHO LA VOLUME INDEX MOD A2C: 21 ML/M2 (ref 16–34)
ECHO LA VOLUME INDEX MOD A4C: 18 ML/M2 (ref 16–34)
ECHO LV E' LATERAL VELOCITY: 21.2 CM/S
ECHO LV E' SEPTAL VELOCITY: 11.6 CM/S
ECHO LV EDV A2C: 99 ML
ECHO LV EDV A4C: 96 ML
ECHO LV EDV INDEX A4C: 42 ML/M2
ECHO LV EDV NDEX A2C: 43 ML/M2
ECHO LV EF PHYSICIAN: 67 %
ECHO LV EJECTION FRACTION A2C: 67 %
ECHO LV EJECTION FRACTION A4C: 67 %
ECHO LV EJECTION FRACTION BIPLANE: 67 % (ref 55–100)
ECHO LV ESV A2C: 33 ML
ECHO LV ESV A4C: 32 ML
ECHO LV ESV INDEX A2C: 14 ML/M2
ECHO LV ESV INDEX A4C: 14 ML/M2
ECHO LV FRACTIONAL SHORTENING: 31 % (ref 28–44)
ECHO LV INTERNAL DIMENSION DIASTOLE INDEX: 2.22 CM/M2
ECHO LV INTERNAL DIMENSION DIASTOLIC: 5.1 CM (ref 4.2–5.9)
ECHO LV INTERNAL DIMENSION SYSTOLIC INDEX: 1.52 CM/M2
ECHO LV INTERNAL DIMENSION SYSTOLIC: 3.5 CM
ECHO LV IVSD: 0.9 CM (ref 0.6–1)
ECHO LV MASS 2D: 151.8 G (ref 88–224)
ECHO LV MASS INDEX 2D: 66 G/M2 (ref 49–115)
ECHO LV POSTERIOR WALL DIASTOLIC: 0.8 CM (ref 0.6–1)
ECHO LV RELATIVE WALL THICKNESS RATIO: 0.31
ECHO LVOT AREA: 3.1 CM2
ECHO LVOT AV VTI INDEX: 0.74
ECHO LVOT DIAM: 2 CM
ECHO LVOT MEAN GRADIENT: 3 MMHG
ECHO LVOT PEAK GRADIENT: 5 MMHG
ECHO LVOT PEAK VELOCITY: 1.1 M/S
ECHO LVOT STROKE VOLUME INDEX: 27.7 ML/M2
ECHO LVOT SV: 63.7 ML
ECHO LVOT VTI: 20.3 CM
ECHO MV A VELOCITY: 0.66 M/S
ECHO MV AREA VTI: 2.4 CM2
ECHO MV E DECELERATION TIME (DT): 135 MS
ECHO MV E VELOCITY: 0.89 M/S
ECHO MV E/A RATIO: 1.35
ECHO MV E/E' LATERAL: 4.2
ECHO MV E/E' RATIO (AVERAGED): 5.94
ECHO MV E/E' SEPTAL: 7.67
ECHO MV LVOT VTI INDEX: 1.3
ECHO MV MAX VELOCITY: 1 M/S
ECHO MV MEAN GRADIENT: 1 MMHG
ECHO MV MEAN VELOCITY: 0.6 M/S
ECHO MV PEAK GRADIENT: 4 MMHG
ECHO MV VTI: 26.4 CM
ECHO PV MAX VELOCITY: 1.3 M/S
ECHO PV PEAK GRADIENT: 7 MMHG
ECHO RIGHT VENTRICULAR SYSTOLIC PRESSURE (RVSP): 24 MMHG
ECHO RV BASAL DIMENSION: 3.4 CM
ECHO RV FREE WALL PEAK S': 15.1 CM/S
ECHO RV TAPSE: 2.3 CM (ref 1.7–?)
ECHO TV REGURGITANT MAX VELOCITY: 2.27 M/S
ECHO TV REGURGITANT PEAK GRADIENT: 21 MMHG

## 2025-05-21 PROCEDURE — 93306 TTE W/DOPPLER COMPLETE: CPT | Performed by: INTERNAL MEDICINE

## 2025-05-21 PROCEDURE — 93005 ELECTROCARDIOGRAM TRACING: CPT | Performed by: FAMILY MEDICINE

## 2025-05-21 PROCEDURE — 71260 CT THORAX DX C+: CPT

## 2025-05-21 PROCEDURE — 99233 SBSQ HOSP IP/OBS HIGH 50: CPT | Performed by: INTERNAL MEDICINE

## 2025-05-21 PROCEDURE — 6360000004 HC RX CONTRAST MEDICATION: Performed by: INTERNAL MEDICINE

## 2025-05-21 PROCEDURE — 6360000002 HC RX W HCPCS: Performed by: NURSE PRACTITIONER

## 2025-05-21 PROCEDURE — 93306 TTE W/DOPPLER COMPLETE: CPT

## 2025-05-21 PROCEDURE — 99232 SBSQ HOSP IP/OBS MODERATE 35: CPT | Performed by: FAMILY MEDICINE

## 2025-05-21 PROCEDURE — 2580000003 HC RX 258: Performed by: INTERNAL MEDICINE

## 2025-05-21 RX ORDER — IOPAMIDOL 755 MG/ML
75 INJECTION, SOLUTION INTRAVASCULAR
Status: COMPLETED | OUTPATIENT
Start: 2025-05-21 | End: 2025-05-21

## 2025-05-21 RX ORDER — MORPHINE SULFATE 2 MG/ML
1 INJECTION, SOLUTION INTRAMUSCULAR; INTRAVENOUS EVERY 4 HOURS PRN
Status: DISCONTINUED | OUTPATIENT
Start: 2025-05-21 | End: 2025-05-21 | Stop reason: HOSPADM

## 2025-05-21 RX ORDER — SODIUM CHLORIDE 9 MG/ML
INJECTION, SOLUTION INTRAVENOUS CONTINUOUS
Status: DISCONTINUED | OUTPATIENT
Start: 2025-05-21 | End: 2025-05-21 | Stop reason: HOSPADM

## 2025-05-21 RX ADMIN — IOPAMIDOL 75 ML: 755 INJECTION, SOLUTION INTRAVENOUS at 05:37

## 2025-05-21 RX ADMIN — SODIUM CHLORIDE: 0.9 INJECTION, SOLUTION INTRAVENOUS at 06:29

## 2025-05-21 RX ADMIN — ONDANSETRON 4 MG: 2 INJECTION, SOLUTION INTRAMUSCULAR; INTRAVENOUS at 09:32

## 2025-05-21 ASSESSMENT — PAIN SCALES - GENERAL
PAINLEVEL_OUTOF10: 0
PAINLEVEL_OUTOF10: 4

## 2025-05-21 ASSESSMENT — PAIN DESCRIPTION - ONSET: ONSET: SUDDEN

## 2025-05-21 ASSESSMENT — PAIN DESCRIPTION - LOCATION: LOCATION: CHEST

## 2025-05-21 NOTE — DISCHARGE SUMMARY
[facility]  Office: [phone]  Lester Gama DO, Adam Whitt DO, Frandy Mendoza DO, Dima Forte DO, Zakiya Michaels MD, Yenny Carr MD, Peewee Ayala MD, Juana Washburn MD,  Blanco Bustos MD, Jermaine Childers MD, Fam Valentine MD,  Milagros Birmingham DO, Jorge L Middleton MD, Primitivo Fall MD, Carrillo Gama DO, Elvia Chaney MD,  Rad Garvin DO, Veena Cuevas MD, Cata Conde MD, Earle Wagogner MD,  Tino Ochoa MD, Aayush Willard MD, Sasha Costello MD, Karena Fritz MD, Bin Ram MD, Jacquelyn Griffin MD, Solo Penaloza DO, Lien Grimaldo MD, Ricky Mcrae MD, Milagros Salamanca MD, Mohsin Reza, MD, Matthew Perez MD, Shirley Waterhouse, CNP,  Estella Gan, CNP, Solo Abbasi, CNP,  Mariya Britt, SALLY, Urszula Adams, CNP, Allyson Banuelos, CNP, Lori Wild, CNP, Shaunna Gutiérrez, CNP, Codie Gauthier, PA-C, Jessi Felton, CNP, Ervin Almazan, CNP,  Pebbles Nathan, CNP, Rema Jorgensen, CNP, Cisco Funes, PA-C, Shelley Freed, CNP,  Maggi Evans, CNS, Mica Wells, CNP, Nereyda Shell, CNP,   Ifrah Goins, CNP         [facility]   IN-PATIENT SERVICE   [facility]    Discharge Summary     Patient ID: Esperanza Ochoa  :  2000   MRN: [id number]     ACCOUNT:  [id number]   Patient's PCP: Surendra Post DNP  Admit Date: 2025   Discharge Date: 2025     Length of Stay: 2  Code Status:  Full Code  Admitting Physician: No admitting provider for patient encounter.  Discharge Physician: Juana Washburn MD     Active Discharge Diagnoses:     Hospital Problem Lists:  Principal Problem:    Syncope and collapse  Active Problems:    Sinus pause    History of atrial fibrillation    Cardiac dysrhythmia    History of alcoholism (HCC)    History of drug use    Marijuana use    Tobacco use  Resolved Problems:    * No resolved hospital problems. *      Admission Condition:  fair     Discharged Condition: fair    Hospital Stay:

## 2025-05-21 NOTE — CONSULTS
[facility]  Inpatient Cardiology Consult             Date:   5/21/2025  Patient name: Esperanza Ochoa  Date of admission:  5/19/2025 10:49 PM  MRN:   [id number]  YOB: 2000      Reason for Admission:  Syncope, asystole    CHIEF COMPLAINT:   Passed out at work     History Obtained From:  Patient and medical record    HISTORY OF PRESENT ILLNESS:      The patient is a 24 y.o gentleman with h/o PAF, cholecystitis / pancreatitis,  bipolar depression and substance abuse who had presented to the [facility] ER yesterday afternoon after a syncopal episode at work.  He had been experiencing periods of brief, sharp left-sided chest discomfort over the past two weeks with occasional sensation of palpitation.  He saw his PCP yesterday for these problems, and echo and 3-day Holter monitor were ordered with office EKG showing sinus rhythm and no acute ST/ T wave changes.  Later yesterday he was seated at work and experienced similar CP, followed by brief syncope with no seizure activity or urinary incontinence reported.  His family transported him to the ER where he appeared very pale and proceeded to have another syncopal episode during phlebotomy with a 12 second asystole reported punctuated by a single junctional beat.  No telemetry strips are available for review.  EKG showed sinus bradycardia, 59 bpm with incomplete RBBB and no acute changes,with normal serial troponins, d-dimer, lipase and hepatic transaminases.  Pt denies any recent URI, abdominal pain, nausea or vomiting.    Past Medical History:   has a past medical history of Atrial fibrillation (HCC), Bipolar disorder (HCC), Depression, Drug abuse (HCC), Gallstone, Pancreatitis, and Substance abuse (HCC).    Past Surgical History:   has a past surgical history that includes Tympanostomy tube placement; Cholecystectomy; and Cholecystectomy, laparoscopic (N/A, 10/1/2022).     Home Medications:    Prior to Admission medications  
  ondansetron (ZOFRAN-ODT) 4 MG disintegrating tablet Take 1 tablet by mouth 3 times daily as needed for Nausea or Vomiting 1/11/24   Josue Lizama MD       Allergies:  Augmentin [amoxicillin-pot clavulanate]    Social History:   reports that he has been smoking cigarettes. He started smoking about 10 years ago. He has a 10.4 pack-year smoking history. He has never used smokeless tobacco. He reports current alcohol use. He reports current drug use. Drug: Marijuana (Weed).     Family History: family history includes Alcohol Abuse in his father; Heart Attack in his maternal grandfather; High Blood Pressure in his mother. No for premature CAD. No for h/o sudden cardiac death    REVIEW OF SYSTEMS:    Constitutional: there has been no unanticipated weight loss. There's been No change in activity level.     Eyes: No visual changes or diplopia. No scleral icterus.  ENT: No Headaches, hearing loss or vertigo.   Cardiovascular: yes chest pain, no dyspnea on exertion, no palpitations, yes loss of consciousness.   Respiratory: No cough or wheezing, no sputum production. No hematemesis. No pleuritic chest pain   Gastrointestinal: No abdominal pain, blood in stools.  Genitourinary: No dysuria, trouble voiding, or hematuria.  Musculoskeletal:  No gait disturbance, No weakness or joint complaints.  Neurological: No headache, diplopia, change in muscle strength, numbness or tingling.         PHYSICAL EXAM:    Physical Examination:    BP (!) 122/53   Pulse 81   Temp 98.2 °F (36.8 °C) (Oral)   Resp 16   Ht 1.88 m (6' 2.02\")   Wt 104 kg (229 lb 4.5 oz)   SpO2 95%   BMI 29.42 kg/m²    Constitutional and General Appearance: alert, cooperative, in no apparent resp distress HEENT: PERRL, no cervical lymphadenopathy  Respiratory:  Good respiratory effort. No for increased work of breathing.   On auscultation: clear to auscultation bilaterally, no basilar rales, no wheezing   Cardiovascular:  regular S1 and S2.  No murmur audible   No

## 2025-05-21 NOTE — PLAN OF CARE
Problem: Discharge Planning  Goal: Discharge to home or other facility with appropriate resources  5/21/2025 1054 by Brii Gregory RN  Outcome: Progressing  5/20/2025 2230 by Jae Epps RN  Outcome: Progressing     Problem: Skin/Tissue Integrity  Goal: Skin integrity remains intact  Description: 1.  Monitor for areas of redness and/or skin breakdown2.  Assess vascular access sites hourly3.  Every 4-6 hours minimum:  Change oxygen saturation probe site4.  Every 4-6 hours:  If on nasal continuous positive airway pressure, respiratory therapy assess nares and determine need for appliance change or resting period  5/21/2025 1054 by Brii Gregory RN  Outcome: Progressing  5/20/2025 2230 by Jae Epps RN  Outcome: Progressing  Flowsheets (Taken 5/20/2025 2000)  Skin Integrity Remains Intact: Monitor for areas of redness and/or skin breakdown     Problem: Safety - Adult  Goal: Free from fall injury  5/21/2025 1054 by Brii Gregory RN  Outcome: Progressing  5/20/2025 2230 by Jae Epps RN  Outcome: Progressing  Flowsheets (Taken 5/20/2025 2000)  Free From Fall Injury: Instruct family/caregiver on patient safety   POC discussed with family bedside, questions and concerns addressed as needed.

## 2025-05-21 NOTE — PLAN OF CARE
Problem: Discharge Planning  Goal: Discharge to home or other facility with appropriate resources  5/20/2025 2230 by Jae Epps RN  Outcome: Progressing  5/20/2025 1915 by Brneda Packer RN  Outcome: Progressing     Problem: Skin/Tissue Integrity  Goal: Skin integrity remains intact  Description: 1.  Monitor for areas of redness and/or skin breakdown2.  Assess vascular access sites hourly3.  Every 4-6 hours minimum:  Change oxygen saturation probe site4.  Every 4-6 hours:  If on nasal continuous positive airway pressure, respiratory therapy assess nares and determine need for appliance change or resting period  5/20/2025 2230 by Jae Epps RN  Outcome: Progressing  5/20/2025 1915 by Brenda Packer RN  Outcome: Progressing     Problem: Safety - Adult  Goal: Free from fall injury  5/20/2025 2230 by Jae Epps RN  Outcome: Progressing  5/20/2025 1915 by Brenda Packer RN  Outcome: Progressing

## 2025-05-21 NOTE — PLAN OF CARE
Printed education materials provided to patient and family in regards to pacemaker placement and post care. Pt is having extreme anxiety regarding procedure. Questions and concerns addressed as needed before and after printed materials were reviewed by pt and wife. Spiritual care also stopped and spoke with wife outside room, pt and wife denied needs at this time.

## 2025-05-21 NOTE — PROGRESS NOTES
Pharmacy Note     Enoxaparin Dose Adjustment    Esperanza Ochoa is a 24 y.o. male. Pharmacist assessment of enoxaparin dose for VTE prophylaxis.    Recent Labs     05/19/25  1115 05/19/25 1954   BUN 16 16       Recent Labs     05/19/25  1115 05/19/25 1954   CREATININE 0.9 0.9       Estimated Creatinine Clearance: 165 mL/min (based on SCr of 0.9 mg/dL).  Estimated CrCl using Ideal Body Weight: 149 mL/min (based on IBW 82.2 kg)    Height:   Ht Readings from Last 1 Encounters:   05/19/25 1.88 m (6' 2\")     Weight:  Wt Readings from Last 1 Encounters:   05/19/25 103.8 kg (228 lb 14.4 oz)       The following enoxaparin dose has been adjusted based upon renal function and/or patient weight per P&T Guidelines:             Enoxaparin 40 mg subcutaneously once daily changed to Enoxaparin 30 mg subcutaneously twice daily.      
     [facility]  [facility] Phys[facility]       Pt Name:Esperanza Ochoa  MRN: [id number]  Birthdate 2000  Date of evaluation: 5/19/25  PCP:  Surendra Post DNP      Flight Course     The patient is a 24 year old M who has been experiencing chest discomfort for 2 weeks that the patient isolates to the left anterior chest wall. While at work he had a syncopal episode and was brought to the [facility] ED for evaluation.  No reported trauma to his chest wall.  No known cardiac issues for the patient or any family or at a young age.  Patient does intermittently smoke and use marijuana. No recent viral infection or URI.    The patient required emergent critical care transport for concerns of a short interval dysrhythmia and approximately 6 to 7 seconds of asystole seen on monitor.  No electronic monitoring / print-off of this asystole event captured.    Basic labs obtained at outside facility.  EKG showed sinus bradycardia with no obvious ST elevations or T wave inversions.  High sensitivity troponin less than 0.6.    The patient on crew arrival (21:57) was stable, on room air and maintenance NS IV fluids at 125cc/hr.    The patient is secured to the litter straps x 3 and hearing protection applied with no complication.  Patient placed on monitor.  Vitals stable during flight.    The patient tolerated transport.    On arrival at [facility] the patient handoff and report was given to nursing; all questions were answered and patient care was transferred to the staff.      All times approximate  PROCEDURES:  None    CRITICAL CARE:  CRITICAL CARE: There was a high probability of clinically significant/life threatening deterioration in this patient's condition which required my urgent intervention.  Total critical care time was 0 minutes.  This excludes any time for separately reportable procedures.     Steve Herrera, DO  Life Flight Physician     (Please note that portions of this 
0335 - Patient complaint of sudden chest pain that last for few second. Notified cardio and intermed on duty. EKG ordered and result sent to Dr Rincon.  .Electronically signed by Jae Epps RN on 5/21/2025 at 7:36 AM   
0450H - EP Dr Friedman on bedside, seen and examined the patient. Patient is for Chest CT and fluids to be started. Patient needs pacemaker as per him.    0515H - CT done.  .Electronically signed by Jae Epps RN on 5/21/2025 at 7:38 AM   
[facility]   Progress Note                   Date:   5/21/2025  Patient name: Esperanza Ochoa  Date of admission:  5/19/2025 10:49 PM  MRN:   [id number]  YOB: 2000  PCP: Surendra Post DNP    Reason for Admission: Syncope    Subjective:       Clinical Changes / Abnormalities: Seen examined at bedside.  Overnight patient heart rate was down to the 40s while asleep, patient was awakened with no symptoms.  Patient was seen by EP early a.m.      Medications:   Scheduled Meds:   sodium chloride flush  5-40 mL IntraVENous 2 times per day    atorvastatin  80 mg Oral Nightly    enoxaparin  30 mg SubCUTAneous BID    aspirin  81 mg Oral Daily     Continuous Infusions:   sodium chloride 100 mL/hr at 05/21/25 0629    sodium chloride       CBC:   Recent Labs     05/19/25 1115 05/19/25 1954 05/20/25  0434   WBC 7.9 10.5 12.5*   HGB 15.6 15.9 14.6    182 186     BMP:    Recent Labs     05/19/25 1115 05/19/25 1954 05/19/25 1959 05/20/25  0434    141  --  139   K 4.3 3.5*  --  3.7    102  --  106   CO2 28 26  --  23   BUN 16 16  --  11   CREATININE 0.9 0.9  --  0.7   GLUCOSE 109* 101* 117 102*     Hepatic:   Recent Labs     05/19/25  1115   AST 18   ALT 25   BILITOT 0.5   ALKPHOS 66     Troponin: No results for input(s): \"TROPONINI\" in the last 72 hours.  BNP: No results for input(s): \"BNP\" in the last 72 hours.  Lipids:   Recent Labs     05/20/25  0434   CHOL 140   HDL 39*     INR: No results for input(s): \"INR\" in the last 72 hours.    Objective:   Vitals: BP (!) 111/58   Pulse 63   Temp 98.2 °F (36.8 °C) (Oral)   Resp 15   Ht 1.88 m (6' 2.02\")   Wt 104 kg (229 lb 4.5 oz)   SpO2 97%   BMI 29.42 kg/m²   General appearance: alert and cooperative with exam  HEENT: Head: Normocephalic, no lesions, without obvious abnormality.  Neck: no adenopathy, no carotid bruit, no JVD, supple, symmetrical, trachea midline and thyroid not enlarged, symmetric, no 
SPIRITUAL HEALTH   [facility]  PROGRESS NOTE    Shift date: 5.21.2025  Shift day: Wednesday   Shift # 1    Room # 1001/1001-01        Name: Esperanza Ochoa MRN: [id number]    Age: 24 y.o.     Sex: male   Language: English   Restoration: Spiritism   Cardiac dysrhythmia     Referral: Nurse    Date: 5/21/2025            Total Time Calculated: (P) 10 min              Spiritual Assessment began in [facility]- SICU        Referral/Consult From: (P) Nurse, Rounding   Encounter Overview/Reason: (P) Family Care  Service Provided For: (P) Family    Admit Date & Time: 5/19/2025 10:49 PM    Emergency Contacts Listed:  Extended Emergency Contact Information  Primary Emergency Contact: CarltonMaeve willoughby           [facility]  Home Phone: [phone]  Relation: Spouse  Secondary Emergency Contact: NestorCrystal  Home Phone: [phone]  Mobile Phone: [phone]  Relation: Parent  Preferred language: English   needed? No      Assessment:  Esperanza Ochoa is a 24 y.o. male in the hospital because Cardiac dysrhythmia.     Upon entering the room writer observes Wife appearing Anxious, Concerned, and Coping.    Wife is concerned about the idea of the patient having to have a pacemaker.  Although she knows that it will ultimately help him, she has questions that she would like to have addressed before the procedure if possible.  She has support that she is in contact with to help process feelings, thoughts, and concerns.       provided support and resources to assist the wife.  Offered support to the patient.      Perceived Need:  Assistance in reducing anxiety, Build a relationship of care and support, Receive care and concern, and Recieve hospitality    Intervention:  Writer introduced self and title as  and Acknowledged current situation, Actively listened, and Provided space for feelings, thoughts, and concerns.    Outcome:  Built relationships of 
Tenderness: There is no abdominal tenderness. There is no guarding.   Musculoskeletal:         General: No tenderness.   Skin:     General: Skin is warm and dry.      Findings: No erythema, lesion or rash.   Neurological:      Mental Status: He is alert and oriented to person, place, and time.      Cranial Nerves: No cranial nerve deficit.      Motor: No seizure activity.   Psychiatric:         Speech: Speech normal.         Behavior: Behavior normal. Behavior is cooperative.         Assessment:        Hospital Problems           Last Modified POA    * (Principal) Syncope and collapse 5/21/2025 Yes    Sinus pause 5/21/2025 Yes    History of atrial fibrillation 5/21/2025 Yes    Cardiac dysrhythmia 5/21/2025 Yes    History of alcoholism (HCC) 5/21/2025 Yes    History of drug use 5/21/2025 Yes    Marijuana use 5/21/2025 Yes    Tobacco use 5/21/2025 Yes       Plan:              Syncope and collapse     Marked Sinus pause for 12 seconds     History of atrial fibrillation    Cardiac dysrhythmia  Tobacco use     History of alcoholism     History of drug use   H/O BIpolar     Marijuana use    H/o cholecystitis and pancreatitis in 2022, s/p cholecystectomy        - Continue on telemetry  - Echo pending  - Seen by EP this am , tentative plan for pacemaker   - Keep NPO  - Fluids started   - CT chest ordered  - Discussed with the patient and the nurse       Juana Washburn MD  5/21/2025  11:25 AM

## 2025-05-22 ENCOUNTER — TELEPHONE (OUTPATIENT)
Dept: FAMILY MEDICINE CLINIC | Age: 25
End: 2025-05-22

## 2025-05-22 ENCOUNTER — HOSPITAL ENCOUNTER (OUTPATIENT)
Age: 25
Discharge: HOME | End: 2025-05-24
Payer: COMMERCIAL

## 2025-05-22 LAB
EKG ATRIAL RATE: 61 BPM
EKG P AXIS: 56 DEGREES
EKG P-R INTERVAL: 194 MS
EKG Q-T INTERVAL: 390 MS
EKG QRS DURATION: 112 MS
EKG QTC CALCULATION (BAZETT): 392 MS
EKG R AXIS: 6 DEGREES
EKG T AXIS: 20 DEGREES
EKG VENTRICULAR RATE: 61 BPM

## 2025-05-22 PROCEDURE — 93246 EXT ECG>7D<15D RECORDING: CPT

## 2025-05-22 NOTE — CARDIO/PULMONARY
The patient was educated on the use of a  14 day epatch holter monitor. The patient's comprehension was high. The patient was able to verbalize recall. The patient was instructed on how and when to return the monitor.

## 2025-05-22 NOTE — TELEPHONE ENCOUNTER
Care Transitions Initial Follow Up Call    Outreach made within 2 business days of discharge: Yes    Patient: Esperanza Ochoa Patient : 2000   MRN: [id number]  Reason for Admission: syncope   Discharge Date: 25       Spoke with: Maeve    Discharge department/facility: [facility] cardiac ICU     TCM Interactive Patient Contact:  Was patient able to fill all prescriptions: No: Pt was not discharged with any new medication  Was patient instructed to bring all medications to the follow-up visit: Yes  Is patient taking all medications as directed in the discharge summary? Pt was not discharged with any medications. They did extend the Holter monitor.   Does patient understand their discharge instructions: Yes  Does patient have questions or concerns that need addressed prior to 7-14 day follow up office visit: No     Additional needs identified to be addressed with provider  No needs identified  Pt is aware that if the Holter monitor patch does become to itchy to contact the office for more information.              Scheduled appointment with PCP within 7-14 days    Follow Up  Future Appointments   Date Time Provider Department Center   2025 11:00 AM Surendra Post DNP [facility] DEP       Elisabeth Shah MA

## 2025-05-29 ENCOUNTER — OFFICE VISIT (OUTPATIENT)
Dept: FAMILY MEDICINE CLINIC | Age: 25
End: 2025-05-29

## 2025-05-29 VITALS
HEIGHT: 74 IN | DIASTOLIC BLOOD PRESSURE: 70 MMHG | WEIGHT: 233 LBS | HEART RATE: 97 BPM | BODY MASS INDEX: 29.9 KG/M2 | OXYGEN SATURATION: 98 % | SYSTOLIC BLOOD PRESSURE: 122 MMHG

## 2025-05-29 DIAGNOSIS — R55 VASOVAGAL SYNCOPE: ICD-10-CM

## 2025-05-29 DIAGNOSIS — R29.818 SUSPECTED SLEEP APNEA: ICD-10-CM

## 2025-05-29 DIAGNOSIS — Z09 HOSPITAL DISCHARGE FOLLOW-UP: ICD-10-CM

## 2025-05-29 DIAGNOSIS — I20.89 STABLE ANGINA PECTORIS: Primary | ICD-10-CM

## 2025-05-29 DIAGNOSIS — I20.1 PRINZMETAL ANGINA: ICD-10-CM

## 2025-05-29 RX ORDER — AMLODIPINE BESYLATE 5 MG/1
5 TABLET ORAL DAILY
Qty: 30 TABLET | Refills: 0 | Status: SHIPPED | OUTPATIENT
Start: 2025-05-29

## 2025-05-29 ASSESSMENT — PATIENT HEALTH QUESTIONNAIRE - PHQ9
2. FEELING DOWN, DEPRESSED OR HOPELESS: NOT AT ALL
SUM OF ALL RESPONSES TO PHQ QUESTIONS 1-9: 0
1. LITTLE INTEREST OR PLEASURE IN DOING THINGS: NOT AT ALL

## 2025-05-29 NOTE — PROGRESS NOTES
Post-Discharge Transitional Care  Follow Up      Esperanza Ochoa   YOB: 2000    Date of Office Visit:  5/29/2025  Date of Hospital Admission: 5/19/25  Date of Hospital Discharge: 5/21/25  Risk of hospital readmission (high >=14%. Medium >=10%) :Readmission Risk Score: 3.9      Care management risk score Rising risk (score 2-5) and Complex Care (Scores >=6): No Risk Score On File     Non face to face  following discharge, date last encounter closed (first attempt may have been earlier): 05/22/2025    Call initiated 2 business days of discharge: Yes    ASSESSMENT/PLAN:   Stable angina pectoris  Prinzmetal angina  Vasovagal syncope  Hospital discharge follow-up  -     MA DISCHARGE MEDS RECONCILED W/ CURRENT OUTPATIENT MED LIST  -     Baseline Diagnostic Sleep Study; Future  -     amLODIPine (NORVASC) 5 MG tablet; Take 1 tablet by mouth daily, Disp-30 tablet, R-Keiko  -     Carrillo Espitia DO, Cardiology, [facility]  Suspected sleep apnea  -     Baseline Diagnostic Sleep Study; Future    Will send pt for sleep study. Will start pt on Norvasc 5mg daily for presumed prinzmetal angina. Pt strongly encouraged to stop smoking cigarettes and marijuana. Follow up with cardiology    Medical Decision Making: high complexity  Return if symptoms worsen or fail to improve.    On this date 5/29/2025 I have spent 25 minutes reviewing previous notes, test results and face to face with the patient discussing the diagnosis and importance of compliance with the treatment plan as well as documenting on the day of the visit.       Subjective:   HPI:  Follow up of Hospital problems/diagnosis(es):     24-year-old male with past medical history of transient A-fib in 2022 (it was suspected to be due to gallbladder issues ), had cholecystectomy in September 2022 patient never followed up with cardiologist, depression, substance use disorder smoking, alcohol, marijuana, LSD and methamphetamines.  Last meth

## 2025-05-29 NOTE — PATIENT INSTRUCTIONS
SURVEY:    You may be receiving a survey from [facility] your [facility] today.    You may get this in the mail, through your MyChart or in your email.     Please complete the survey to enable us to provide the highest quality of care to you and your family.      Thank you.    Clinical Care Team:         ANGEL Marcelino-ESPINOZA Canales                         Triage:         ESPINOZA Dave             Clerical Team:        Crystal Moe       Ruth [phone]           Billing questions: [phone]           Medical Records Request: [phone]

## 2025-06-16 ENCOUNTER — OFFICE VISIT (OUTPATIENT)
Dept: CARDIOLOGY CLINIC | Age: 25
End: 2025-06-16
Payer: COMMERCIAL

## 2025-06-16 VITALS
OXYGEN SATURATION: 97 % | HEIGHT: 74 IN | BODY MASS INDEX: 29.9 KG/M2 | SYSTOLIC BLOOD PRESSURE: 146 MMHG | HEART RATE: 87 BPM | WEIGHT: 233 LBS | DIASTOLIC BLOOD PRESSURE: 79 MMHG

## 2025-06-16 DIAGNOSIS — R55 VASOVAGAL SYNCOPE: Primary | ICD-10-CM

## 2025-06-16 DIAGNOSIS — I48.0 PAROXYSMAL ATRIAL FIBRILLATION (HCC): ICD-10-CM

## 2025-06-16 DIAGNOSIS — I10 ESSENTIAL HYPERTENSION: ICD-10-CM

## 2025-06-16 PROCEDURE — 3078F DIAST BP <80 MM HG: CPT | Performed by: INTERNAL MEDICINE

## 2025-06-16 PROCEDURE — 99204 OFFICE O/P NEW MOD 45 MIN: CPT | Performed by: INTERNAL MEDICINE

## 2025-06-16 PROCEDURE — 3077F SYST BP >= 140 MM HG: CPT | Performed by: INTERNAL MEDICINE

## 2025-06-16 NOTE — PROGRESS NOTES
[facility], Junior Rubi, , FACC, FCCP, FACOI       Patient: Esperanza Ochoa   : 2000   Date of Visit: 25     REASON FOR VISIT / CONSULTATION:   Chief Complaint   Patient presents with    Palpitations    New Patient          History of Present Illness:        Dear Surendra Post, SALLY     I had the pleasure of seeing Esperanza Ochoa  today. He is a 24 y.o. Male with a history of possible history of brief atrial fibrillation related to gallbladder issues in , bipolar disorder, depression, substance abuse (marijuana, LSD, alcohol, methamphetamine last in -currently use occasional marijuana and smoking 1 PPD), smoking, pancreatitis and syncope who comes in to establish cardiac care.    He apparently has had transient A-fib suspected to be relate to gall bladder issues and had a cholecystectomy in 2022 however patient never followed up with cardiology.  He most recently presented to ED with a syncopal episode at work on  25 around 6:45 PM where he had a sudden onset of chest pain on the left side that can concern for him and then he passed out a few minutes later.  Patient was sitting on the chair when it happened and was able to stop doing what he was doing and get into a safe situation prior to having syncope.  His coworker helped him.  Patient went to his PCP for a similar complaint who ordered EKG, echocardiogram and Holter.     Patient was placed on monitor in ED and he had a 5 to 10-second pause episode however did not have syncope.  He was transferred to [facility] in [place] and admitted with initial blood pressure of 159/64.  Lab: sodium 141, potassium 3.5 otherwise BMP unremarkable.  Troponin negative x 2.  Ethanol level < 10.  Urine drug screen positive for cannabis.  CBC unremarkable.  Chest x-ray unremarkable.  EKG that was done at outside hospital showed sinus bradycardia.    Patient was

## 2025-06-16 NOTE — PROGRESS NOTES
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  - chest pain -  Felt a weird feeling prior to passing  Came to ER - had another episode   Had couple pauses while in ER   And was life flighted to [facility]   2 week prior had \"weird\" chest pain   The day of the episode had just see   Pcp that am for chest pain   and was going to get a monitor put on   Has not had any Sx since being on   Amlodipine   No cp/dizziness/syncope since   Being on Amlodipine   Was released to return to work  From [employer] but Work states he can not   Go back until released from a \"cardiologist\"   Passes out with site  blood   Prior to work- use to exercise       Follow up in 3 months     Released to go back to work with no restrictions

## 2025-06-23 ENCOUNTER — TELEPHONE (OUTPATIENT)
Dept: CARDIOLOGY CLINIC | Age: 25
End: 2025-06-23

## 2025-06-23 NOTE — TELEPHONE ENCOUNTER
SIMI Meraz from [employer] contacted office regarding letter for patient. States that she disagrees with letter stating that patient is cleared to work without any restrictions considering he has syncopal episodes whilst driving a forklift. Please review and advise. Her direct line is [phone]

## 2025-06-24 NOTE — TELEPHONE ENCOUNTER
Tried to call Mariya at number given [phone] unable to even leave voicemail- called nurse line left a msg to have Mariya call us back regarding pt.  PER DR MADERA-  He did not have syncopal episode while driving (tow motor) per pt. No change on our part and ultimately comes down to whether the company wants him.  See this note in media

## 2025-07-01 DIAGNOSIS — Z09 HOSPITAL DISCHARGE FOLLOW-UP: ICD-10-CM

## 2025-07-01 RX ORDER — AMLODIPINE BESYLATE 5 MG/1
5 TABLET ORAL DAILY
Qty: 30 TABLET | Refills: 5 | Status: SHIPPED | OUTPATIENT
Start: 2025-07-01

## 2025-07-01 NOTE — TELEPHONE ENCOUNTER
Last OV: 5/29/2025      Pt called for med refill on Norvasc, Med pending approval.      Thank you!

## 2025-08-09 DIAGNOSIS — R03.0 ELEVATED BLOOD-PRESSURE READING, WITHOUT DIAGNOSIS OF HYPERTENSION: Primary | ICD-10-CM

## 2025-08-09 DIAGNOSIS — Z09 HOSPITAL DISCHARGE FOLLOW-UP: ICD-10-CM

## 2025-08-11 RX ORDER — AMLODIPINE BESYLATE 5 MG/1
5 TABLET ORAL DAILY
Qty: 30 TABLET | Refills: 5 | Status: SHIPPED | OUTPATIENT
Start: 2025-08-11 | End: 2026-02-07

## (undated) DEVICE — APPLICATOR MEDICATED 26 CC SOLUTION HI LT ORNG CHLORAPREP

## (undated) DEVICE — PENCIL ES L3M BTTN SWCH HOLSTER W/ BLDE ELECTRD EDGE

## (undated) DEVICE — DISSECTOR LAP DIA5MM BLNT TIP ENDOPATH

## (undated) DEVICE — TROCAR: Brand: KII FIOS FIRST ENTRY

## (undated) DEVICE — ELECTRO LUBE IS A SINGLE PATIENT USE DEVICE THAT IS INTENDED TO BE USED ON ELECTROSURGICAL ELECTRODES TO REDUCE STICKING.: Brand: KEY SURGICAL ELECTRO LUBE

## (undated) DEVICE — GLOVE ORANGE PI 7 1/2   MSG9075

## (undated) DEVICE — 40595 XL TRENDELENBURG POSITIONING KIT: Brand: 40595 XL TRENDELENBURG POSITIONING KIT

## (undated) DEVICE — CANNULA SEAL

## (undated) DEVICE — STRIP,CLOSURE,WOUND,MEDI-STRIP,1/2X4: Brand: MEDLINE

## (undated) DEVICE — GARMENT,MEDLINE,DVT,INT,CALF,MED, GEN2: Brand: MEDLINE

## (undated) DEVICE — ELECTRODE LAP L36CM PTFE WIRE J HK CLEANCOAT

## (undated) DEVICE — DEVICE TRCR 12X9X3IN WHT CLSR DISP OMNICLOSE

## (undated) DEVICE — TISSUE RETRIEVAL SYSTEM: Brand: INZII RETRIEVAL SYSTEM

## (undated) DEVICE — SOLUTION IV IRRIG POUR BRL 0.9% SODIUM CHL 2F7124

## (undated) DEVICE — BLADE CLIPPER GEN PURP NS

## (undated) DEVICE — TROCARS: Brand: KII® BALLOON BLUNT TIP SYSTEM

## (undated) DEVICE — SYRINGE, LUER LOCK, 10ML: Brand: MEDLINE

## (undated) DEVICE — DRESSING SURESITE-123PLUSPAD 2.4 IN X2.8 IN

## (undated) DEVICE — INTENDED FOR TISSUE SEPARATION, AND OTHER PROCEDURES THAT REQUIRE A SHARP SURGICAL BLADE TO PUNCTURE OR CUT.: Brand: BARD-PARKER ® STAINLESS STEEL BLADES

## (undated) DEVICE — 3M™ PRECISE™ VISTA DISPOSABLE SKIN STAPLER 3995: Brand: 3M™ PRECISE™

## (undated) DEVICE — PLUMEPORT LAPAROSCOPIC SMOKE FILTRATION DEVICE: Brand: PLUMEPORT ACTIV

## (undated) DEVICE — SCISSOR SURG METZ CRV TIP

## (undated) DEVICE — GLOVE ORANGE PI 7   MSG9070

## (undated) DEVICE — TROCAR: Brand: KII® SLEEVE

## (undated) DEVICE — INSUFFLATION NEEDLE TO ESTABLISH PNEUMOPERITONEUM.: Brand: INSUFFLATION NEEDLE

## (undated) DEVICE — CLIP INT L POLYMER LOK LIG HEM O LOK

## (undated) DEVICE — APPLIER CLP M L L11.4IN DIA10MM ENDOSCP ROT MULT FOR LIG

## (undated) DEVICE — PACK LAP BASIC

## (undated) DEVICE — SYRINGE MED 10ML LUERLOCK TIP W/O SFTY DISP

## (undated) DEVICE — SUTURE SZ 0 27IN 5/8 CIR UR-6  TAPER PT VIOLET ABSRB VICRYL J603H

## (undated) DEVICE — Device

## (undated) DEVICE — SOLUTION ANTIFOG VIS SYS CLEARIFY LAPSCP

## (undated) DEVICE — SUTURE MCRYL SZ 4-0 L18IN ABSRB UD L16MM PC-3 3/8 CIR PRIM Y845G

## (undated) DEVICE — TOWEL,OR,DSP,ST,NATURAL,DLX,4/PK,20PK/CS: Brand: MEDLINE

## (undated) DEVICE — BAG SPEC REM 224ML W4XL6IN DIA10MM 1 HND GYN DISP ENDOPCH

## (undated) DEVICE — BLADELESS OBTURATOR: Brand: WECK VISTA

## (undated) DEVICE — DRAPE,UTILTY,TAPE,15X26, 4EA/PK: Brand: MEDLINE

## (undated) DEVICE — ARM DRAPE

## (undated) DEVICE — MITT SURG PREP L ADH DISPOSABLE

## (undated) DEVICE — INTENDED FOR TISSUE SEPARATION, AND OTHER PROCEDURES THAT REQUIRE A SHARP SURGICAL BLADE TO PUNCTURE OR CUT.: Brand: BARD-PARKER ® CARBON RIB-BACK BLADES

## (undated) DEVICE — SUTURE VCRL + SZ 0 L27IN ABSRB VLT L26MM UR-6 5/8 CIR VCP603H

## (undated) DEVICE — 3M™ TEGADERM™ +PAD FILM DRESSING WITH NON-ADHERENT PAD, 3587, 3-1/2 IN X 4-1/8 IN (9 CM X 10.5 CM), 25/CAR, 4 CAR/CS: Brand: 3M™ TEGADERM™